# Patient Record
Sex: FEMALE | Race: WHITE | Employment: PART TIME | URBAN - METROPOLITAN AREA
[De-identification: names, ages, dates, MRNs, and addresses within clinical notes are randomized per-mention and may not be internally consistent; named-entity substitution may affect disease eponyms.]

---

## 2017-12-22 ENCOUNTER — OFFICE VISIT (OUTPATIENT)
Dept: SURGERY | Age: 42
End: 2017-12-22

## 2017-12-22 VITALS
TEMPERATURE: 98.3 F | HEART RATE: 78 BPM | OXYGEN SATURATION: 99 % | HEIGHT: 61 IN | WEIGHT: 178.6 LBS | DIASTOLIC BLOOD PRESSURE: 68 MMHG | BODY MASS INDEX: 33.72 KG/M2 | RESPIRATION RATE: 16 BRPM | SYSTOLIC BLOOD PRESSURE: 118 MMHG

## 2017-12-22 RX ORDER — GLIPIZIDE 10 MG/1
10 TABLET ORAL DAILY
COMMUNITY
End: 2018-10-20

## 2017-12-22 RX ORDER — LANOLIN ALCOHOL/MO/W.PET/CERES
CREAM (GRAM) TOPICAL
COMMUNITY
End: 2018-10-20

## 2017-12-22 RX ORDER — ROSUVASTATIN CALCIUM 20 MG/1
5 TABLET, COATED ORAL
COMMUNITY

## 2017-12-22 RX ORDER — INSULIN GLARGINE 100 [IU]/ML
30 INJECTION, SOLUTION SUBCUTANEOUS
COMMUNITY
End: 2018-10-20

## 2017-12-22 RX ORDER — LISINOPRIL 5 MG/1
2.5 TABLET ORAL
COMMUNITY

## 2017-12-22 RX ORDER — METFORMIN HYDROCHLORIDE 500 MG/1
1000 TABLET ORAL 2 TIMES DAILY WITH MEALS
COMMUNITY
End: 2018-10-20

## 2017-12-22 RX ORDER — ASPIRIN 81 MG/1
TABLET ORAL DAILY
COMMUNITY
End: 2018-10-20

## 2017-12-22 NOTE — PROGRESS NOTES
Subjective:   Jordan Curiel is a 38 y.o. female presents for evaluation for bariatric surgery. Co-morbidities include hypertension, diabetes, high cholesterol. Objective:     Visit Vitals    /68 (BP 1 Location: Left arm, BP Patient Position: Sitting)    Pulse 78    Temp 98.3 °F (36.8 °C) (Oral)    Resp 16    Ht 5' 1\" (1.549 m)    Wt 178 lb 9.6 oz (81 kg)    SpO2 99%    BMI 33.75 kg/m2       Physical Exam: deferred     Assessment:     Patient's BMI does not meet the minimum requirement (<35 kg/m^2)    Plan:     1. Follow up when patient meets criteria for bariatric surgery. 12:40 PM - 12:46 PM  Total time spent with patient: 6 minutes.     Signed By: Catrina Laureano     December 22, 2017      Written by Molly Kaur, as dictated by Radha Hoffmann MD.

## 2017-12-22 NOTE — PROGRESS NOTES
1. Have you been to the ER, urgent care clinic since your last visit? Hospitalized since your last visit? No    2. Have you seen or consulted any other health care providers outside of the [address] [place] [address] since your last visit? Include any pap smears or colon screening. No     Ninoska Rodriguez  Body composition    female  38 y.o. Vitals:    12/22/17 1237   BP: 118/68   Pulse: 78   Resp: 16   Temp: 98.3 °F (36.8 °C)   TempSrc: Oral   SpO2: 99%   Weight: 178 lb 9.6 oz (81 kg)   Height: 5' 1\" (1.549 m)     Body mass index is 33.75 kg/(m^2). Marty Keel Neck- 14in  Waist-42. 5in  Hips-41.5in  Frame size-6 cm small frame

## 2018-01-11 ENCOUNTER — OFFICE VISIT (OUTPATIENT)
Dept: SURGERY | Age: 43
End: 2018-01-11

## 2018-01-11 VITALS
DIASTOLIC BLOOD PRESSURE: 71 MMHG | OXYGEN SATURATION: 98 % | BODY MASS INDEX: 35.63 KG/M2 | HEIGHT: 60 IN | WEIGHT: 181.5 LBS | TEMPERATURE: 98.1 F | SYSTOLIC BLOOD PRESSURE: 117 MMHG | HEART RATE: 82 BPM | RESPIRATION RATE: 14 BRPM

## 2018-01-11 DIAGNOSIS — Z79.4 TYPE 2 DIABETES MELLITUS WITH MICROALBUMINURIA, WITH LONG-TERM CURRENT USE OF INSULIN (HCC): ICD-10-CM

## 2018-01-11 DIAGNOSIS — E11.29 TYPE 2 DIABETES MELLITUS WITH MICROALBUMINURIA, WITH LONG-TERM CURRENT USE OF INSULIN (HCC): ICD-10-CM

## 2018-01-11 DIAGNOSIS — R80.9 TYPE 2 DIABETES MELLITUS WITH MICROALBUMINURIA, WITH LONG-TERM CURRENT USE OF INSULIN (HCC): ICD-10-CM

## 2018-01-11 NOTE — PROGRESS NOTES
1. Have you been to the ER, urgent care clinic since your last visit? Hospitalized since your last visit?no    2. Have you seen or consulted any other health care providers outside of the [address] [place] [address] since your last visit? Include any pap smears or colon screening.  no

## 2018-01-11 NOTE — PROGRESS NOTES
Bariatric Surgery Consultation    Subjective: The patient is a 38 y.o. obese  female with a Body mass index is 35.45 kg/(m^2). Aruna Thompson The patient is currently at her heaviest weight. she has been overweight since she was 13years old. she has been considering surgery for 6 months. she desires surgery at this time because she has had little to no success with medical weight loss and her diabetes is getting worse. She has been on insulin for at least 2 years and she has mircoalbuminemia. Janina Arenas has tried multiple diets in her lifetime most recently tried physician supervised, unsupervised diets, Weight Watchers and Atkins        The patient desires laparoscopic gastric bypass surgery for surgical weight loss. The patients goal weight is 120lb. The highest acceptable weight is 170lb. These goals are consistent with expected outcomes of their desired operation. her Medical goals are to improve her blood sugar and prevent diabetic comorbidtites. her qualty of life goals are to have more energy, more self esteem and to be more active. There are no active problems to display for this patient. No past surgical history on file. Social History   Substance Use Topics    Smoking status: Never Smoker    Smokeless tobacco: Never Used    Alcohol use No      Family History   Problem Relation Age of Onset    Cancer Mother      breast    Diabetes Father     Heart Disease Maternal Grandmother     Heart Disease Paternal Grandfather       Prior to Admission medications    Medication Sig Start Date End Date Taking? Authorizing Provider   rosuvastatin (CRESTOR) 20 mg tablet Take 20 mg by mouth nightly. Yes Historical Provider   metFORMIN (GLUCOPHAGE) 500 mg tablet Take  by mouth two (2) times daily (with meals). Yes Historical Provider   ferrous sulfate (IRON) 325 mg (65 mg iron) tablet Take  by mouth Daily (before breakfast).    Yes Historical Provider   glipiZIDE (GLUCOTROL) 10 mg tablet Take 10 mg by mouth two (2) times a day. Yes Historical Provider   aspirin delayed-release 81 mg tablet Take  by mouth daily. Yes Historical Provider   empagliflozin (JARDIANCE) 25 mg tablet Take  by mouth daily. Yes Historical Provider   lisinopril (PRINIVIL, ZESTRIL) 5 mg tablet Take  by mouth daily. Yes Historical Provider   insulin glulisine (APIDRA) 100 unit/mL injection 10 Units by SubCUTAneous route. Yes Historical Provider   insulin glargine (LANTUS) 100 unit/mL injection 30 Units by SubCUTAneous route nightly. Yes Historical Provider     No Known Allergies      Review of Systems:    A comprehensive review of systems was negative except for that written in the HPI. Objective:     Visit Vitals    /71 (BP 1 Location: Left arm, BP Patient Position: Sitting)    Pulse 82    Temp 98.1 °F (36.7 °C) (Oral)    Resp 14    Ht 5' (1.524 m)    Wt 181 lb 8 oz (82.3 kg)    LMP 12/21/2017 (Exact Date)    SpO2 98%    BMI 35.45 kg/m2       Physical Exam:  General:  Alert, cooperative, no distress, appears stated age. obese   Eyes:  Conjunctivae/corneas clear. , EOMs intact. Neck: Supple, symmetrical, trachea midline, no adenopathy, thyroid: no enlargment/tenderness/nodules,    Back:   Symmetric, no curvature. ROM normal. No CVA tenderness. Lungs:   Clear to auscultation bilaterally. Heart:  Regular rate and rhythm, S1, S2 normal, no murmur, click, rub or gallop. Abdomen:   Soft, non-tender. Bowel sounds normal. No masses,  No organomegaly. Extremities: Extremities normal, atraumatic, no cyanosis or edema. Lymph nodes: Cervical, supraclavicular,  nodes normal.   Neurologic: AOX3. Normal strength, sensation throughout. Assessment:     Morbid obesity with comorbidity  Plan:     laparoscopic gastric bypass surgery    This is a 38 y.o. female with a BMI of Body mass index is 35.45 kg/(m^2).  and the weight-related co-morbidties of DM. Michlele Rincon meets the NIH criteria for bariatric surgery based upon the BMI of Body mass index is 35.45 kg/(m^2). and multiple weight-related co-morbidties. Kg Rob has elected laparoscopic quan-en-Y gastric bypass as her intervention of choice for treatment of morbid obestiy through surgical means secondary to its long term history of success. In the office today, following Radha's history and physical examination, a 30 minute discussion regarding the anatomic alterations for the laparoscopic quan-en-Y gastric bypass was undertaken. The dietary expectations and the patient and physician dependent factors for success were thoroughly discussed, to include the need for interval follow-up and long-term dietary changes associated with success. The possible complications of the quan-en-Y gastric bypass  were also discussed, to include; staple line leak, bleeding, stricture, infection, internal hernia and pouch dilation. Specific weight related outcomes for success were also discussed with an emphasis on careful and close follow-up with the first year. The patient expressed an understanding of the above factors, and her questions were answered in their entirety. In addition, the patient attended a 1.5 hour power point seminar regarding obesity, surgical weight loss including, adjustable gastric band, gastric bypass, and sleeve gastrectomy. This discussion contrasted the different surgical techniques, mechanisms of actions and expected outcomes, and surgical and medical risks associated with each procedure. During this seminar, there was a long question and answer session where each questions was answered until there were no additional questions. Today, the patient had all of her questions answered and desires to proceed with pre-qualification for bariatric surgery initially choosing quan-en-Y gastric bypass as her surgical option.     Signed By: Luz Godinez MD     January 11, 2018

## 2018-01-12 DIAGNOSIS — E66.01 MORBID OBESITY (HCC): Primary | ICD-10-CM

## 2018-01-12 DIAGNOSIS — E11.9 TYPE 2 DIABETES MELLITUS WITHOUT COMPLICATION, UNSPECIFIED LONG TERM INSULIN USE STATUS: ICD-10-CM

## 2018-01-26 ENCOUNTER — HOSPITAL ENCOUNTER (OUTPATIENT)
Dept: DIABETES SERVICES | Age: 43
Discharge: HOME | End: 2018-01-26
Payer: COMMERCIAL

## 2018-01-26 PROCEDURE — G0108 DIAB MANAGE TRN  PER INDIV: HCPCS

## 2018-01-26 NOTE — DIABETES MGMT
[facility]  - Diabetes Assessment and   Pre-operative Bariatric Nutrition Education    2018    Referring Physician/Surgeon:   Dr. Ever Conteh  NAME: Anya Rocha : 1975 AGE: 38 y.o. GENDER: female  REASON FOR VISIT:  laparoscopic gastric bypass surgery    Assessment:        Past Medical History:   Diagnosis Date    Diabetes ([facility] [address]        Diabetes Medications:   Metformin 500 mg BID; Glipizide 10 mg/d; Jardiance 25 mg/d; Apidra 15 units ac; Lantus 30 units HS    Vitamins:     Iron    Food Allergies/Intolerances: none reported    Exercise/Physical Activity: walks 3-4d/wk at least 30 minutes     Anthropometrics:   Ht Readings from Last 1 Encounters:   18 5' (1.524 m)      Weight: 180 lb   BMI: 35 kg/M     Obesity Class: 3    Laboratory:  No results found for: HBA1C, HGBE8, [id number]. Pts A1c from her endo office on 18 was 7.6%    Reported Diabetes History:   10 year history of T2DM. Checks BG occasionally, usually only if she feels bad      24 hour Diet recall:  Breakfast skips or will eat Sausage biscuit or will drink a Slim-Fast  Lunch Soup or yogurt & granola or [employer] with chips  Dinner usually fast food- [employer], [employer], [employer] or [employer]  Snacks pretzels, string cheese, fruit  Beverages mostly water; will drink 1 soda or sweet tea/d        Nutrition/Diabetes Intervention:  Patient educated on nutrition recommendations for weight loss surgery, specifically Reviewed intake  Understanding label reading  Understanding low carbohydrates, low sugar, higher protein meals  Understanding proper portions  Dining outside home     . Instructed on consuming 3 meals per day, consistent in carbohydrate, starting now. Use the balanced plate method to plan meals, include 3 ounces of lean source of protein, 1/2 cup whole grains, unlimited non-starchy vegetables, 1/2 cup fruit and 1 serving of low fat dairy.    Utilize handouts listing healthy snack and meal ideas to limit restaurant meals.     Read all nutrition labels. Demonstrated and emphasized identifying serving size, total fat, total carbohydrate, and protein content. Patient to check blood glucose levels 1 times per day and to continue all diabetes medications unless instructed otherwise by MD.      Nutrition Monitoring and Evaluation: The following goals were established with the patient   1. Eat 3 meals/d  2. Eliminate sugary carbonated drinks  3. Check BG daily     Plan:     Specific tips and techniques to facilitate compliance with above recommendations were provided and discussed. Pt was encouraged to begin making necessary changes now         [x]      Patient to follow up with [facility] on 2/23/18 to reevaluate readiness for surgery        My phone number and e-mail was provided to the patient for any further questions or concerns. Shweta Espinal RD, CDE

## 2018-02-02 ENCOUNTER — CLINICAL SUPPORT (OUTPATIENT)
Dept: SURGERY | Age: 43
End: 2018-02-02

## 2018-02-02 DIAGNOSIS — E66.9 OBESITY (BMI 30-39.9): Primary | ICD-10-CM

## 2018-02-05 VITALS — BODY MASS INDEX: 35.54 KG/M2 | WEIGHT: 182 LBS

## 2018-02-05 NOTE — PROGRESS NOTES
Pre-operative Bariatric Nutrition Evaluation (1 of 3)     Date: 2018   Lesa Robertson M.D. Name: Hope Russo  :  1975  Age:  38  Gender: Female   Type of Surgery: [x]           Gastric Bypass  []           LAGB  []           Sleeve Gastrectomy    ASSESSMENT:    Past Medical History: Type II DM, high cholesterol      Medications/Supplements:   Prior to Admission medications    Medication Sig Start Date End Date Taking? Authorizing Provider   rosuvastatin (CRESTOR) 20 mg tablet Take 20 mg by mouth nightly. Historical Provider   metFORMIN (GLUCOPHAGE) 500 mg tablet Take  by mouth two (2) times daily (with meals). Historical Provider   ferrous sulfate (IRON) 325 mg (65 mg iron) tablet Take  by mouth Daily (before breakfast). Historical Provider   glipiZIDE (GLUCOTROL) 10 mg tablet Take 10 mg by mouth two (2) times a day. Historical Provider   aspirin delayed-release 81 mg tablet Take  by mouth daily. Historical Provider   empagliflozin (JARDIANCE) 25 mg tablet Take  by mouth daily. Historical Provider   lisinopril (PRINIVIL, ZESTRIL) 5 mg tablet Take  by mouth daily. Historical Provider   insulin glulisine (APIDRA) 100 unit/mL injection 10 Units by SubCUTAneous route. Historical Provider   insulin glargine (LANTUS) 100 unit/mL injection 30 Units by SubCUTAneous route nightly. Historical Provider       Food Allergies/Intolerances:none    Anthropometrics:    Ht:60\"    Wt: 182#    IBW: 100#    %IBW: 182%     BMI:35    Category: obesity II     Reported wt history:Pt presents today for pre-op nutrition evaluation for wt loss surgery. Pt reports lowest adult BW was 125# at age 33. Highest adult BW was 192# at age 30. Attributes wt gain over the years r/t wt gain with pregnancy and physical inactivity. Has attempted wt loss over the years through various methods.  Most significant wt loss was 30# with Nancie Harding and states \"I was getting  at the time and very motivated to lose weight\". Pt has otherwise been unable to achieve or maintain long term significant wt loss and is now seeking approval for weight loss surgery. Pt is motivated for surgery to improve her health and resolve her diabetes. Pt will need to complete 3 months supervised weight loss with our program to fulfill the insurance requirements. Exercise/Physical Activity:lacks intentional exercise; pt walks throughout the day and take stairs at work, but does not have intentional exercise regimen    Reported Diet History:Orly Hopkins, Weight Watchers, diet pills, Nutrisystem, fasting, exercise, Physician prescribed diets     24 Hour Diet Recall  Breakfast  1 bagel w/ cream cheese, 1% milk   Lunch  Soup (veggie with chicken or beef)    Dinner  Usually fast food - [employer] grilled chicken with fries and soda   Snacks  Oranges or small apple    Beverages  Water throughout the day, 1 soda or tea     A pre-op nutrition checklist was reviewed. Patient checked off 5 of 15 items. Environment/Psychosocial/Support:pt's , kids, parents and friends are all listed as support system. Pt works part time. Pt does grocery shopping and cooking and shares some with . Pt reports she and  are very social and somewhat nervous about surgery will impact that after surgery. NUTRITION DIAGNOSIS:  1. Physical inactivity r/t lack of motivation evidenced by pt reports no current exercise regimen. 2. Excessive energy intake r/t food and beverage choices evidenced by diet recall. Pt drinks some sugar sweetened beverages. Eating fast food/restaurant meals 5 or more times per week. NUTRITION INTERVENTION:  Pt educated on nutrition recommendations for weight loss surgery, specifically gastric bypass. Instructed on consuming 3 meals per day starting now.   Use the balanced plate method to plan meals, include 3 oz of lean source of protein, 1/2 cup whole grains, unlimited non-starchy vegetables, 1/2 cup fruit and 1 serving of low fat dairy. Utilize handouts listing healthy snack and meal ideas to limit restaurant meals. After surgery measure all meals to 1/2 cup. Each meal will contain a 1/4 cup lean protein and 1/4 cup fruit, non-starchy vegetable or starch (limiting to once per day). Aim for 60 g protein per day. Sip on 48-64 oz of sugar free, calorie free, non-carbonated beverages each day. Do not use a straw. Do not consume beverages 30 minutes before, during or 30 minutes after meals. Read all nutrition labels. Demonstrated and emphasized identifying serving size, total fat, sugar and protein content. Defined low fat as </= 3 g per serving. Discussed lean and extra lean sources of protein. Provided list of low fat cooking methods. Avoid foods with sugar listed in the first 3 ingredients and >/15 g sugar per serving. Excess sugar/fat intake may lead to dumping syndrome. Discussed signs and symptoms of dumping syndrome. Practice mindful eating habits; take small bites, chew thoroughly, avoid distractions, utilize hunger/fullness scale. Consume meals over 20-30 minutes. Attend Bariatric Support Group and increase physical activity (approved per MD) for long term weight maintenance. NUTRITION MONITORING AND EVALUATION:    The following goals were established with patient;  1. Increase intentional activity/walking. Consider a 30 minute walk, 3 times per week. Can also do 10-15 minute intervals spaced throughout the day. 2. Decrease soda and sweetened tea. Replace with more water and other calorie-free, sugar-free and non-carbonated options. 3. Decrease fast food and restaurant meals. More cooking and food prepping at home. Eating out should eventually be limited to no more than 1-2 times per week. 4. Eat 3 meals a day. Do not skip meals. 5. Follow up with RD next month for supervised weight loss.            Specific tips and techniques to facilitate compliance with above recommendations were provided and discussed. Pt was strongly encourage to begin making necessary changes now, attend support group, and re-visit the dietitian prn. Nutrition evaluation reveals lifestyle changes are indicated in order for pt to better comply with nutrition guidelines for gastric bypass. Goals set and recommendations made. Will continue to assess as pt works to complete supervised weight loss requirements. If further details are desired please feel free to contact me at [phone]. This phone number was also provided to the patient for any further questions or concerns.            Grace Saldana RD

## 2018-02-28 ENCOUNTER — HOSPITAL ENCOUNTER (OUTPATIENT)
Dept: DIABETES SERVICES | Age: 43
Discharge: HOME | End: 2018-02-28
Payer: COMMERCIAL

## 2018-02-28 DIAGNOSIS — E66.01 MORBID OBESITY (HCC): ICD-10-CM

## 2018-02-28 DIAGNOSIS — E11.9 TYPE 2 DIABETES MELLITUS WITHOUT COMPLICATION, UNSPECIFIED LONG TERM INSULIN USE STATUS: ICD-10-CM

## 2018-02-28 PROCEDURE — G0108 DIAB MANAGE TRN  PER INDIV: HCPCS

## 2018-02-28 NOTE — DIABETES MGMT
[facility]   Pre-operative Bariatric Nutrition Evaluation  2018    NAME: Anjelica Dickson : 1975 AGE: 38 y.o. GENDER: female    REASON FOR VISIT: Follow-up to further education for bariatric surgery      WEIGHT: 185.4 lbs (this is a 5lb increase since she last saw me)    LABORATORY:  No results found for: HBA1C, HGBE8, [id number] Pts A1c at her Endo office on 18 was 7.6%    ASSESSMENT: Since pt was last here, she has made the following changes:  1. She is SMBG 3x/d  2. She has only had 2 regular sodas since seeing me  3. She is trying to monitor food portions     Recommendations Provided to Patient:  1. Meal Planning for semi-solid and soft-texture/low fat diet   2. Signs and treatment for hypoglycemia     Patient's Goal(s):    1. Eliminate regular sodas  2. Measure foods and keep a food diary to ensure eating no more than 45gm CHO/meal    Specific tips and techniques to facilitate compliance with above recommendations were provided and discussed. Pt was encouraged to begin making necessary changes now, attend support group, and re-visit the dietitian prn. If further details are desired please feel free to contact me at [phone]. This phone number was also provided to the patient for any further questions or concerns. Shweta Baron RD, CDE

## 2018-03-01 ENCOUNTER — CLINICAL SUPPORT (OUTPATIENT)
Dept: SURGERY | Age: 43
End: 2018-03-01

## 2018-03-01 VITALS — WEIGHT: 182 LBS | BODY MASS INDEX: 35.54 KG/M2

## 2018-03-01 DIAGNOSIS — E66.9 OBESITY (BMI 30-39.9): Primary | ICD-10-CM

## 2018-03-01 NOTE — PROGRESS NOTES
[employer] [facility]  Supervised Weight Loss     Date:   3/1/2018    Patient's Name: Reji Ramírez  : 1975    Insurance:  [facility]          Session: 2 of  3  Surgery: Gastric Bypass  Surgeon:  Cassy Stewart M.D. Height: 60\"   Weight:    182      Lbs. BMI: 35   Pounds Lost since last month: 0               Pounds Gained since last month: 0    Starting Weight: 182#   Previous Months Weight: 182#  Overall Pounds Lost: 0  Overall Pounds Gained: 0    Other Pertinent Information: n/a     Smoking Status:  none  Alcohol Intake: 1 drink, once a month    I have reviewed with patient the guidelines of the supervised weight loss class. Patient understands the expectations of some weight loss during the weight loss trial.  Patient understands that weight gain could delay the process. I have also expressed to patient that classes need to be consecutive. Missing a class may subject patient to have to start their trial over. Patient has received this information in writing. Changes that patient has made since last month include:  No sodas, trying to be more active. Eating Habits and Behaviors  A nutrition lesson specific to portion control was presented We discussed using the balanced plate method before surgery to practice portion control along with slowing down eating pace. Their plate should be made up of 1/2 coming from non-starchy vegetables, 1/4 coming from lean meat, and 1/4 of their plate coming from carbohydrates, including fruits, starches, or milk. We discussed measuring meals to 1/2 cup total per meal after surgery. Emphasis was placed on the importance of eating 3 meals a day and aiming for 60 grams of protein per day. I educated the patient on limiting liquid calories and drinking only calorie-free, sugar-free and non-carbonated beverages. We discussed the importance of drinking 64 ounces of fluid per day to prevent dehydration post-operatively. Patient's current diet habits include: eating 2-3 meals per day. Snacking on pickles, crackers and cheese. Eating chips, crackers and pasta once a week. Eating cookies 1-2 times per week. Eating baked foods. Eating out is 1-3 times per week. Drinking 7 oz water, 1 oz sweetened tea, 1 oz unsweetened tea per day. Unsure if patient reported ounces of fluid accurately or meant cups. Reports emotional eating r/t boredom and does not report any non-food coping mechanisms in place at this time. Sometimes packing meals when away from home. Eating most meals at a table and while watching television and takes 15-20 minutes to finish the meal. Reports overeating, food choices, portion sizes and lack of activity are biggest barriers to weight loss. Physical Activity/Exercise  During class we discussed the importance of increasing daily physical activity and beginning to develop an exercise regimen/routine. We discussed that exercise is an important part of long term weight loss. Comments:  During class, I discussed with patient the importance of getting into an exercise routine. Patient is currently not exercising stating \"being lazy\". Patient has been encouraged to make an effort to implement exercise this next month and eventually working up to 150 minutes per week. Behavior Modification       Comments: We discussed the importance of eating mindfully after weight loss surgery to prevent food intolerance and prevent weight regain. We talked about how to eat more mindfully and identify emotional eating triggers. Tips and recommendations for how to make these changes were provided. Patient was encouraged to keep a food journal and record what they were taking in daily. Overall Assessment: Patient demonstrates small lifestyle changes this past month evidenced by reported changes and weight maintenance. Will continue to assess as pt works to complete supervised weight loss requirements.      Patient-Set Goals: 1. Nutrition - no more sodas, slow down when eating, be more mindful  2. Exercise - move more, get busy, move during commercials  3.  Behavior - less stress, better sleep, enjoy life    Tani Kaiser, RD  3/1/2018

## 2018-04-03 ENCOUNTER — CLINICAL SUPPORT (OUTPATIENT)
Dept: SURGERY | Age: 43
End: 2018-04-03

## 2018-04-03 VITALS — WEIGHT: 182 LBS | BODY MASS INDEX: 35.54 KG/M2

## 2018-04-03 DIAGNOSIS — E66.9 OBESITY (BMI 30-39.9): Primary | ICD-10-CM

## 2018-04-03 NOTE — PROGRESS NOTES
[facility] [place]  Supervised Weight Loss     Date:   4/3/2018    Patient's Name: Tri Wilcox  : 1975    Insurance:  Mikaela Mcconnell          Session: 3 of  3  Surgery: Gastric Bypass  Surgeon:  Jarrell Heimlich, M.D. Height: 60\"   Weight:    182      Lbs. BMI: 35   Pounds Lost since last month: 0               Pounds Gained since last month: 0    Starting Weight: 182#   Previous Months Weight: 182#  Overall Pounds Lost: 0  Overall Pounds Gained: 0    Other Pertinent Information: n/a     Smoking Status:  none  Alcohol Intake: 1 drink, once a month     I have reviewed with patient the guidelines of the supervised weight loss class. Patient understands the expectations of some weight loss during the weight loss trial.  Patient understands that weight gain could delay the process. I have also expressed to patient that classes need to be consecutive. Missing a class may subject patient to have to start their trial over. Patient has received this information in writing. Changes that patient has made since last month include:  Less sodas, walking more, smaller portions. Eating Habits and Behaviors  A review of the general nutrition guidelines in preparation for weight loss surgery was provided. Patients were instructed that their plate should be made up 1/2 plate coming from non-starchy vegetables, 1/4 coming from lean meat, and 1/4 of their plate coming from carbohydrates, including fruits, starches, or milk. We discussed measuring meals to 1/2 cup total per meal after surgery. Emphasis was placed on the importance of eating 3 meals a day and aiming for 60 grams of protein per day. I educated the patient on limiting liquid calories and drinking only calorie-free, sugar-free and non-carbonated beverages. We discussed the importance of drinking 64 ounces of fluid per day to prevent dehydration post-operatively.                        Patient's current diet habits include: eating 2-3 meals per day. Snacking on fruit, baked chips and pickles. Eating baked chips and bread 1-2 times per week and cookies once a week or less. Eating mostly baked foods. Eating out is 1-3 times per week. Drinking mostly water and half sweet/half unsweetened tea. Denies emotional eating and sometimes situational eating. Eating most meals at a table and takes 15-20 minutes to finish the meal. Reports overeating, night eating, food choices, portion sizes, snacking and lack of activity are all barriers to weight loss at this time. Physical Activity/Exercise  A lifestyle and behavior change discussion was provided specific to exercise. We discussed common barriers to exercise, ways to work around barriers and various ways to get started with exercise. We talked about the importance of increasing daily physical activity and beginning to develop an exercise regimen/routine. We discussed that exercise is an important part of long term weight loss after surgery. Comments:  During class, I discussed with patient the importance of getting into an exercise routine. Patient is currently walking, taking the stairs and parking further away for activity. Patient has been encouraged to maintain and increase as tolerated. Behavior Modification       Comments: We discussed the importance of eating mindfully after weight loss surgery to prevent food intolerance and prevent weight regain. We talked about how to eat more mindfully and identify emotional eating triggers. Tips and recommendations for how to make these changes were provided. Patient was encouraged to keep a food journal and record what they were taking in daily. Overall Assessment: Patient demonstrates some small lifestyle changes in preparation for weight loss surgery evidenced by reported changes and weight maintenance/not gaining. Will continue to assess as pt works to complete supervised weight loss requirements.      Patient-Set Goals: 1. Nutrition - eat healthier, eat smarter   2. Exercise - more more, do more exercise   3.  Behavior -stop making excuses     Urvashi Marquez, KIRSTEN  4/3/2018

## 2018-05-08 ENCOUNTER — CLINICAL SUPPORT (OUTPATIENT)
Dept: SURGERY | Age: 43
End: 2018-05-08

## 2018-05-08 VITALS — BODY MASS INDEX: 35.54 KG/M2 | WEIGHT: 182 LBS

## 2018-05-08 DIAGNOSIS — E66.9 OBESITY (BMI 30-39.9): Primary | ICD-10-CM

## 2018-05-08 NOTE — PROGRESS NOTES
[facility]  Supervised Weight Loss     Date:   2018    Patient's Name: Katerina White  : 1975    Insurance:  Cabrera Barahona          Session: 4 of  3 (90 days completed)   Surgery: Gastric Bypass  Surgeon:  Shannan Noe M.D. Height: 60\"   Weight:    182      Lbs. BMI: 35   Pounds Lost since last month: 0               Pounds Gained since last month: 0    Starting Weight: 182#   Previous Months Weight: 182#  Overall Pounds Lost: 0  Overall Pounds Gained: 0    Other Pertinent Information: n/a     Smoking Status:  none  Alcohol Intake: none    I have reviewed with patient the guidelines of the supervised weight loss class. Patient understands the expectations of some weight loss during the weight loss trial.  Patient understands that weight gain could delay the process. I have also expressed to patient that classes need to be consecutive. Missing a class may subject patient to have to start their trial over. Patient has received this information in writing. Changes that patient has made since last month include:  No sodas, reduced intake of sweetened tea, minimal fast food intake. Eating Habits and Behaviors  A review of the general nutrition guidelines in preparation for weight loss surgery was provided. A nutrition less was presented regarding label reading with specific emphasis on limiting added sugars to help promote weight loss and prevent dumping syndrome. Patients were instructed that their plate should be made up 1/2 plate coming from non-starchy vegetables, 1/4 coming from lean meat, and 1/4 of their plate coming from carbohydrates, including fruits, starches, or milk. We discussed measuring meals to 1/2 cup total per meal after surgery. Emphasis was placed on the importance of eating 3 meals a day and aiming for 60 grams of protein per day.  I educated the patient on limiting liquid calories and drinking only calorie-free, sugar-free and non-carbonated beverages. We discussed the importance of drinking 64 ounces of fluid per day to prevent dehydration post-operatively. Patient's current diet habits include: eating 2-3 meals per day. Snacking on fruit, crackers and pickles. Eating chips and pretzels 1-2 times per week. Eating cake once a month. Eating baked, grilled, broiled and some fried foods. Eating out is 1-3 times per week. Drinking 8 oz water, half sweetened half unsweetened tea. Sometimes emotional and situational eating. Sometimes packing meals when away from home. Eating most meals at a table and takes 15-20 minutes to finish the meal. Reports overeating, grazing, food choices, portion sizes and lack of activity are biggest barriers to weight loss at this time. Physical Activity/Exercise  We talked about the importance of increasing daily physical activity and beginning to develop an exercise regimen/routine. We discussed that exercise is an important part of long term weight loss after surgery. Comments:  During class, I discussed with patient the importance of getting into an exercise routine. Patient is currently walking 2-3 times per week for activity. Patient has been encouraged to maintain and increase as tolerated. Behavior Modification       Comments: We discussed the importance of eating mindfully after weight loss surgery to prevent food intolerance and prevent weight regain. We talked about how to eat more mindfully and identify emotional eating triggers. Tips and recommendations for how to make these changes were provided. Patient was encouraged to keep a food journal and record what they were taking in daily. Overall Assessment: Patient has demonstrated small appropriate lifestyle changes in preparation for weight loss surgery. Appears to understand the general nutrition guidelines and is appropriate for surgery at this time from a nutrition standpoint. Patient-Set Goals:   1. Nutrition - eliminate all sodas and sweet tea  2. Exercise - walk more often and further   3.  Behavior -less tv and food time     Sari Katz, [address]  5/8/2018

## 2018-05-22 DIAGNOSIS — E66.01 MORBID OBESITY (HCC): Primary | ICD-10-CM

## 2018-05-22 DIAGNOSIS — E11.9 TYPE 2 DIABETES MELLITUS WITHOUT COMPLICATION, UNSPECIFIED WHETHER LONG TERM INSULIN USE (HCC): ICD-10-CM

## 2018-07-19 ENCOUNTER — HOSPITAL ENCOUNTER (OUTPATIENT)
Dept: ULTRASOUND IMAGING | Age: 43
Discharge: HOME | End: 2018-07-19
Attending: NURSE PRACTITIONER
Payer: COMMERCIAL

## 2018-07-19 ENCOUNTER — OFFICE VISIT (OUTPATIENT)
Dept: SURGERY | Age: 43
End: 2018-07-19

## 2018-07-19 ENCOUNTER — HOSPITAL ENCOUNTER (OUTPATIENT)
Dept: PREADMISSION TESTING | Age: 43
Discharge: HOME | End: 2018-07-19
Attending: NURSE PRACTITIONER
Payer: COMMERCIAL

## 2018-07-19 ENCOUNTER — HOSPITAL ENCOUNTER (OUTPATIENT)
Dept: GENERAL RADIOLOGY | Age: 43
Discharge: HOME | End: 2018-07-19
Attending: NURSE PRACTITIONER
Payer: COMMERCIAL

## 2018-07-19 VITALS
HEART RATE: 108 BPM | BODY MASS INDEX: 35.02 KG/M2 | DIASTOLIC BLOOD PRESSURE: 80 MMHG | TEMPERATURE: 97.8 F | SYSTOLIC BLOOD PRESSURE: 118 MMHG | WEIGHT: 185.5 LBS | HEIGHT: 61 IN | OXYGEN SATURATION: 97 % | RESPIRATION RATE: 18 BRPM

## 2018-07-19 VITALS
BODY MASS INDEX: 34.58 KG/M2 | TEMPERATURE: 98.3 F | HEIGHT: 61 IN | SYSTOLIC BLOOD PRESSURE: 119 MMHG | WEIGHT: 183.13 LBS | DIASTOLIC BLOOD PRESSURE: 73 MMHG | HEART RATE: 76 BPM | OXYGEN SATURATION: 98 %

## 2018-07-19 DIAGNOSIS — Z01.818 PREOP GENERAL PHYSICAL EXAM: ICD-10-CM

## 2018-07-19 DIAGNOSIS — E66.01 MORBID OBESITY (HCC): Primary | ICD-10-CM

## 2018-07-19 DIAGNOSIS — E66.01 MORBID OBESITY (HCC): ICD-10-CM

## 2018-07-19 DIAGNOSIS — E11.9 TYPE 2 DIABETES MELLITUS WITHOUT COMPLICATION, UNSPECIFIED WHETHER LONG TERM INSULIN USE (HCC): ICD-10-CM

## 2018-07-19 LAB
25(OH)D3 SERPL-MCNC: 14.5 NG/ML (ref 30–100)
ALBUMIN SERPL-MCNC: 3.9 G/DL (ref 3.5–5)
ALBUMIN/GLOB SERPL: 1.1 {RATIO} (ref 1.1–2.2)
ALP SERPL-CCNC: 65 U/L (ref 45–117)
ALT SERPL-CCNC: 27 U/L (ref 12–78)
ANION GAP SERPL CALC-SCNC: 10 MMOL/L (ref 5–15)
APPEARANCE UR: CLEAR
AST SERPL-CCNC: 21 U/L (ref 15–37)
ATRIAL RATE: 68 BPM
BACTERIA URNS QL MICRO: ABNORMAL /HPF
BASOPHILS # BLD: 0.1 K/UL (ref 0–0.1)
BASOPHILS NFR BLD: 1 % (ref 0–1)
BILIRUB SERPL-MCNC: 0.4 MG/DL (ref 0.2–1)
BILIRUB UR QL: NEGATIVE
BUN SERPL-MCNC: 10 MG/DL (ref 6–20)
BUN/CREAT SERPL: 24 (ref 12–20)
CALCIUM SERPL-MCNC: 9.3 MG/DL (ref 8.5–10.1)
CALCULATED P AXIS, ECG09: 24 DEGREES
CALCULATED R AXIS, ECG10: 17 DEGREES
CALCULATED T AXIS, ECG11: 19 DEGREES
CHLORIDE SERPL-SCNC: 103 MMOL/L (ref 97–108)
CHOLEST SERPL-MCNC: 141 MG/DL
CO2 SERPL-SCNC: 25 MMOL/L (ref 21–32)
COLOR UR: ABNORMAL
CREAT SERPL-MCNC: 0.41 MG/DL (ref 0.55–1.02)
DIAGNOSIS, 93000: NORMAL
DIFFERENTIAL METHOD BLD: ABNORMAL
EOSINOPHIL # BLD: 0.2 K/UL (ref 0–0.4)
EOSINOPHIL NFR BLD: 3 % (ref 0–7)
EPITH CASTS URNS QL MICRO: ABNORMAL /LPF
ERYTHROCYTE [DISTWIDTH] IN BLOOD BY AUTOMATED COUNT: 15 % (ref 11.5–14.5)
GLOBULIN SER CALC-MCNC: 3.7 G/DL (ref 2–4)
GLUCOSE SERPL-MCNC: 133 MG/DL (ref 65–100)
GLUCOSE UR STRIP.AUTO-MCNC: >1000 MG/DL
HCT VFR BLD AUTO: 42.4 % (ref 35–47)
HGB BLD-MCNC: 13.7 G/DL (ref 11.5–16)
HGB UR QL STRIP: NEGATIVE
HYALINE CASTS URNS QL MICRO: ABNORMAL /LPF (ref 0–5)
IMM GRANULOCYTES # BLD: 0.1 K/UL (ref 0–0.04)
IMM GRANULOCYTES NFR BLD AUTO: 1 % (ref 0–0.5)
IRON SERPL-MCNC: 62 UG/DL (ref 35–150)
KETONES UR QL STRIP.AUTO: ABNORMAL MG/DL
LDH SERPL L TO P-CCNC: 104 U/L (ref 81–246)
LEUKOCYTE ESTERASE UR QL STRIP.AUTO: NEGATIVE
LYMPHOCYTES # BLD: 3.2 K/UL (ref 0.8–3.5)
LYMPHOCYTES NFR BLD: 34 % (ref 12–49)
MAGNESIUM SERPL-MCNC: 1.8 MG/DL (ref 1.6–2.4)
MCH RBC QN AUTO: 25.2 PG (ref 26–34)
MCHC RBC AUTO-ENTMCNC: 32.3 G/DL (ref 30–36.5)
MCV RBC AUTO: 78.1 FL (ref 80–99)
MONOCYTES # BLD: 0.6 K/UL (ref 0–1)
MONOCYTES NFR BLD: 6 % (ref 5–13)
NEUTS SEG # BLD: 5.2 K/UL (ref 1.8–8)
NEUTS SEG NFR BLD: 56 % (ref 32–75)
NITRITE UR QL STRIP.AUTO: NEGATIVE
NRBC # BLD: 0 K/UL (ref 0–0.01)
NRBC BLD-RTO: 0 PER 100 WBC
P-R INTERVAL, ECG05: 162 MS
PH UR STRIP: 5 [PH] (ref 5–8)
PLATELET # BLD AUTO: 351 K/UL (ref 150–400)
PMV BLD AUTO: 8.5 FL (ref 8.9–12.9)
POTASSIUM SERPL-SCNC: 4 MMOL/L (ref 3.5–5.1)
PROT SERPL-MCNC: 7.6 G/DL (ref 6.4–8.2)
PROT UR STRIP-MCNC: NEGATIVE MG/DL
Q-T INTERVAL, ECG07: 422 MS
QRS DURATION, ECG06: 84 MS
QTC CALCULATION (BEZET), ECG08: 448 MS
RBC # BLD AUTO: 5.43 M/UL (ref 3.8–5.2)
RBC #/AREA URNS HPF: ABNORMAL /HPF (ref 0–5)
SODIUM SERPL-SCNC: 138 MMOL/L (ref 136–145)
SP GR UR REFRACTOMETRY: 1.03 (ref 1–1.03)
T4 FREE SERPL-MCNC: 1 NG/DL (ref 0.8–1.5)
TSH SERPL DL<=0.05 MIU/L-ACNC: 1.28 UIU/ML (ref 0.36–3.74)
UA: UC IF INDICATED,UAUC: ABNORMAL
UROBILINOGEN UR QL STRIP.AUTO: 0.2 EU/DL (ref 0.2–1)
VENTRICULAR RATE, ECG03: 68 BPM
WBC # BLD AUTO: 9.4 K/UL (ref 3.6–11)
WBC URNS QL MICRO: ABNORMAL /HPF (ref 0–4)

## 2018-07-19 PROCEDURE — 82465 ASSAY BLD/SERUM CHOLESTEROL: CPT | Performed by: SURGERY

## 2018-07-19 PROCEDURE — 80053 COMPREHEN METABOLIC PANEL: CPT | Performed by: SURGERY

## 2018-07-19 PROCEDURE — 84443 ASSAY THYROID STIM HORMONE: CPT | Performed by: SURGERY

## 2018-07-19 PROCEDURE — 76700 US EXAM ABDOM COMPLETE: CPT

## 2018-07-19 PROCEDURE — 86677 HELICOBACTER PYLORI ANTIBODY: CPT | Performed by: SURGERY

## 2018-07-19 PROCEDURE — 83735 ASSAY OF MAGNESIUM: CPT | Performed by: SURGERY

## 2018-07-19 PROCEDURE — 83615 LACTATE (LD) (LDH) ENZYME: CPT | Performed by: SURGERY

## 2018-07-19 PROCEDURE — 82306 VITAMIN D 25 HYDROXY: CPT | Performed by: SURGERY

## 2018-07-19 PROCEDURE — 71046 X-RAY EXAM CHEST 2 VIEWS: CPT

## 2018-07-19 PROCEDURE — 87086 URINE CULTURE/COLONY COUNT: CPT | Performed by: SURGERY

## 2018-07-19 PROCEDURE — 81001 URINALYSIS AUTO W/SCOPE: CPT | Performed by: SURGERY

## 2018-07-19 PROCEDURE — 93005 ELECTROCARDIOGRAM TRACING: CPT

## 2018-07-19 PROCEDURE — 84439 ASSAY OF FREE THYROXINE: CPT | Performed by: SURGERY

## 2018-07-19 PROCEDURE — 83540 ASSAY OF IRON: CPT | Performed by: SURGERY

## 2018-07-19 PROCEDURE — 85025 COMPLETE CBC W/AUTO DIFF WBC: CPT | Performed by: SURGERY

## 2018-07-19 RX ORDER — ONDANSETRON 4 MG/1
4 TABLET, ORALLY DISINTEGRATING ORAL
Qty: 30 TAB | Refills: 0 | Status: SHIPPED | OUTPATIENT
Start: 2018-07-19 | End: 2018-10-20

## 2018-07-19 RX ORDER — PHENOL/SODIUM PHENOLATE
20 AEROSOL, SPRAY (ML) MUCOUS MEMBRANE DAILY
Qty: 30 TAB | Refills: 2 | Status: SHIPPED | OUTPATIENT
Start: 2018-07-19 | End: 2018-10-20

## 2018-07-19 RX ORDER — ERGOCALCIFEROL 1.25 MG/1
50000 CAPSULE ORAL
Qty: 12 CAP | Refills: 3 | Status: SHIPPED | OUTPATIENT
Start: 2018-07-20

## 2018-07-19 NOTE — PROGRESS NOTES
1. Have you been to the ER, urgent care clinic since your last visit? Hospitalized since your last visit? No    2. Have you seen or consulted any other health care providers outside of the [address] [place] [address] since your last visit? Include any pap smears or colon screening.   No

## 2018-07-19 NOTE — PATIENT INSTRUCTIONS
Stop Aspirin one week prior to surgery. Laparoscopic Joey-en-Y Gastric Bypass: Before Your Surgery  What is a laparoscopic Joey-en-Y gastric bypass? A Joey-en-Y (say \"mitchell-en-why\") gastric bypass is surgery to help you lose weight. It does this in two ways. First, it makes the stomach smaller. Second, it changes the connection between the stomach and the intestines. These changes help you eat less and feel full sooner. You will be asleep during the surgery. The doctor will make several small cuts in your belly. These cuts are called incisions. Then the doctor puts special tools and a camera through the incisions. Next, the doctor separates the upper part of your stomach from the rest of your stomach to make a small pouch. This new pouch will hold the food you eat. The doctor will connect the new stomach pouch to the middle part of your small intestine. Then he or she will close the incisions with stitches. The incisions leave scars that fade with time. After the surgery, the food you eat will go from the small pouch to the middle part of your intestine. Food will no longer go through the lower part of your stomach or the first part of your intestines. You will stay in the hospital 1 or more days after the surgery. Most people can go back to work or their usual routine in about 2 to 4 weeks. Follow-up care is a key part of your treatment and safety. Be sure to make and go to all appointments, and call your doctor if you are having problems. It's also a good idea to know your test results and keep a list of the medicines you take. What happens before surgery?   Surgery can be stressful. This information will help you understand what you can expect. And it will help you safely prepare for surgery.   Preparing for surgery    · Understand exactly what surgery is planned, along with the risks, benefits, and other options.     · Tell your doctors ALL the medicines, vitamins, supplements, and herbal remedies you take. Some of these can increase the risk of bleeding or interact with anesthesia.     · If you take blood thinners, such as warfarin (Coumadin), clopidogrel (Plavix), or aspirin, be sure to talk to your doctor. He or she will tell you if you should stop taking these medicines before your surgery. Make sure that you understand exactly what your doctor wants you to do.     · Your doctor will tell you which medicines to take or stop before your surgery. You may need to stop taking certain medicines a week or more before surgery. So talk to your doctor as soon as you can.     · If you have an advance directive, let your doctor know. It may include a living will and a durable power of  for health care. Bring a copy to the hospital. If you don't have one, you may want to prepare one. It lets your doctor and loved ones know your health care wishes. Doctors advise that everyone prepare these papers before any type of surgery or procedure.     · You may need to follow a clear liquid diet for several days before surgery. Your doctor will tell you how to do this. What happens on the day of surgery? · Follow the instructions exactly about when to stop eating and drinking. If you don't, your surgery may be canceled. If your doctor told you to take your medicines on the day of surgery, take them with only a sip of water.     · Take a bath or shower before you come in for your surgery. Do not apply lotions, perfumes, deodorants, or nail polish.     · Do not shave the surgical site yourself.     · Take off all jewelry and piercings. And take out contact lenses, if you wear them.    At the hospital or surgery center   · Bring a picture ID.     · The area for surgery is often marked to make sure there are no errors.     · You will be kept comfortable and safe by your anesthesia provider. You will be asleep during the surgery.     · The surgery will take about 2 to 3 hours.    Going home   · Be sure you have someone to drive you home. Anesthesia and pain medicine make it unsafe for you to drive.     · You will be given more specific instructions about recovering from your surgery. They will cover things like diet, wound care, follow-up care, driving, and getting back to your normal routine. When should you call your doctor? · You have questions or concerns.     · You don't understand how to prepare for your surgery.     · You become ill before the surgery (such as fever, flu, or a cold).     · You need to reschedule or have changed your mind about having the surgery. Where can you learn more? Go to http://vivi-teagan.info/. Enter L963 in the search box to learn more about \"Laparoscopic Joey-en-Y Gastric Bypass: Before Your Surgery. \"  Current as of: September 10, 2017  Content Version: 11.7  © 0201-2083 [employer]. Care instructions adapted under license by [employer] (which disclaims liability or warranty for this information). If you have questions about a medical condition or this instruction, always ask your healthcare professional. [address] any warranty or liability for your use of this information.

## 2018-07-19 NOTE — MR AVS SNAPSHOT
[address] [place] [address] 
[phone] Patient: Radha Ross MRN: [id number]:[id number] Visit Information Date & Time Provider Department Dept. Phone Encounter #  
 7/19/2018  1:00 PM Kehinde Jones NP [place] [id number] [phone] [id number] Your Appointments 7/20/2018 10:30 AM  
ESTABLISHED PATIENT with Rbuy Jean MD  
[address] [facility] [address] [place] [address]) Appt Note: *sign consents for RY 8-15-18*; *sign consents for RY 8-15-18*  
 [address]  
[phone]  
  
   
 [address] Upcoming Health Maintenance Date Due HEMOGLOBIN A1C Q6M 1973 LIPID PANEL Q1 1973 FOOT EXAM Q1 3/9/1983 MICROALBUMIN Q1 3/9/1983 EYE EXAM RETINAL OR DILATED Q1 3/9/1983 Pneumococcal 19-64 Medium Risk (1 of 1 - PPSV23) 3/9/1992 DTaP/Tdap/Td series (1 - Tdap) 3/9/1994 PAP AKA CERVICAL CYTOLOGY 3/9/1994 Influenza Age 5 to Adult 8/1/2018 Allergies as of 7/19/2018  Review Complete On: 7/19/2018 By: Kehinde Jones NP No Known Allergies Current Immunizations  Never Reviewed No immunizations on file. Not reviewed this visit You Were Diagnosed With   
  
 Codes Comments Morbid obesity ([facility] [address]    -  Primary ICD-10-CM: E66.01 
ICD-9-CM: 278.01 Preop general physical exam     ICD-10-CM: U17.076 ICD-9-CM: V72.83 Type 2 diabetes mellitus without complication, unspecified whether long term insulin use (HCC)     ICD-10-CM: E11.9 ICD-9-CM: 250.00 BMI 35.0-35.9,adult     ICD-10-CM: Y01.73 ICD-9-CM: V85.35 Vitals BP Pulse Temp Resp Height(growth percentile) Weight(growth percentile) 118/80 (BP 1 Location: Left arm, BP Patient Position: Sitting) (!) 108 97.8 °F (36.6 °C) (Oral) 18 5' 0.5\" (1.537 m) 185 lb 8 oz (84.1 kg) LMP SpO2 BMI OB Status Smoking Status 07/06/2018 (Approximate) 97% 35.63 kg/m2 Having regular periods Never Smoker BMI and BSA Data Body Mass Index Body Surface Area  
 35.63 kg/m 2 1.89 m 2 Preferred Pharmacy Pharmacy Name Phone Deisi Laureano [address] [phone] Your Updated Medication List  
  
   
This list is accurate as of 7/19/18  1:42 PM.  Always use your most recent med list.  
  
  
  
  
 APIDRA U-100 INSULIN 100 unit/mL injection Generic drug:  insulin glulisine U-100  
15 Units by SubCUTAneous route Before breakfast, lunch, and dinner. aspirin delayed-release 81 mg tablet Take  by mouth daily. ergocalciferol 50,000 unit capsule Commonly known as:  ERGOCALCIFEROL Take 1 Cap by mouth every Monday and Friday. Start taking on:  7/20/2018  
  
 glipiZIDE 10 mg tablet Commonly known as:  Seldon Vesna Take 10 mg by mouth daily. Iron 325 mg (65 mg iron) tablet Generic drug:  ferrous sulfate Take  by mouth Daily (before breakfast). JARDIANCE 25 mg tablet Generic drug:  empagliflozin Take  by mouth daily. LANTUS U-100 INSULIN 100 unit/mL injection Generic drug:  insulin glargine 30 Units by SubCUTAneous route nightly. lisinopril 5 mg tablet Commonly known as:  Nany Loss Take 5 mg by mouth nightly. metFORMIN 500 mg tablet Commonly known as:  GLUCOPHAGE Take 1,000 mg by mouth two (2) times daily (with meals). Omeprazole delayed release 20 mg tablet Commonly known as:  PRILOSEC D/R Take 1 Tab by mouth daily. ondansetron 4 mg disintegrating tablet Commonly known as:  ZOFRAN ODT Take 1 Tab by mouth every six (6) hours as needed for Nausea. rosuvastatin 20 mg tablet Commonly known as:  CRESTOR Take 20 mg by mouth nightly. Prescriptions Sent to Pharmacy Refills ergocalciferol (ERGOCALCIFEROL) 50,000 unit capsule 3 Starting on: 7/20/2018 Sig: Take 1 Cap by mouth every Monday and Friday. Class: Normal  
 Pharmacy: [facility] DR SITA PALACIOS [address] [place] [address] Ph #: [phone] Route: Oral  
 Omeprazole delayed release (PRILOSEC D/R) 20 mg tablet 2 Sig: Take 1 Tab by mouth daily. Class: Normal  
 Pharmacy: [facility] DR SITA PALACIOS [address] [place] [address] Ph #: [phone] Route: Oral  
 ondansetron (ZOFRAN ODT) 4 mg disintegrating tablet 0 Sig: Take 1 Tab by mouth every six (6) hours as needed for Nausea. Class: Normal  
 Pharmacy: [facility] DR SITA PALACIOS [address] [place] [address] Ph #: [phone] Route: Oral  
  
Patient Instructions Stop Aspirin one week prior to surgery. Laparoscopic Joey-en-Y Gastric Bypass: Before Your Surgery What is a laparoscopic Joey-en-Y gastric bypass? A Joey-en-Y (say \"mitchell-en-why\") gastric bypass is surgery to help you lose weight. It does this in two ways. First, it makes the stomach smaller. Second, it changes the connection between the stomach and the intestines. These changes help you eat less and feel full sooner. You will be asleep during the surgery. The doctor will make several small cuts in your belly. These cuts are called incisions. Then the doctor puts special tools and a camera through the incisions. Next, the doctor separates the upper part of your stomach from the rest of your stomach to make a small pouch. This new pouch will hold the food you eat. The doctor will connect the new stomach pouch to the middle part of your small intestine. Then he or she will close the incisions with stitches. The incisions leave scars that fade with time. After the surgery, the food you eat will go from the small pouch to the middle part of your intestine. Food will no longer go through the lower part of your stomach or the first part of your intestines. You will stay in the hospital 1 or more days after the surgery. Most people can go back to work or their usual routine in about 2 to 4 weeks. Follow-up care is a key part of your treatment and safety. Be sure to make and go to all appointments, and call your doctor if you are having problems. It's also a good idea to know your test results and keep a list of the medicines you take. What happens before surgery? 
 Surgery can be stressful. This information will help you understand what you can expect. And it will help you safely prepare for surgery. 
 Preparing for surgery 
  · Understand exactly what surgery is planned, along with the risks, benefits, and other options. · Tell your doctors ALL the medicines, vitamins, supplements, and herbal remedies you take. Some of these can increase the risk of bleeding or interact with anesthesia.  
  · If you take blood thinners, such as warfarin (Coumadin), clopidogrel (Plavix), or aspirin, be sure to talk to your doctor. He or she will tell you if you should stop taking these medicines before your surgery. Make sure that you understand exactly what your doctor wants you to do.  
  · Your doctor will tell you which medicines to take or stop before your surgery. You may need to stop taking certain medicines a week or more before surgery. So talk to your doctor as soon as you can.  
  · If you have an advance directive, let your doctor know. It may include a living will and a durable power of  for health care. Bring a copy to the hospital. If you don't have one, you may want to prepare one. It lets your doctor and loved ones know your health care wishes. Doctors advise that everyone prepare these papers before any type of surgery or procedure.  
  · You may need to follow a clear liquid diet for several days before surgery. Your doctor will tell you how to do this. What happens on the day of surgery? · Follow the instructions exactly about when to stop eating and drinking. If you don't, your surgery may be canceled. If your doctor told you to take your medicines on the day of surgery, take them with only a sip of water.  
  · Take a bath or shower before you come in for your surgery. Do not apply lotions, perfumes, deodorants, or nail polish.  
  · Do not shave the surgical site yourself.  
  · Take off all jewelry and piercings. And take out contact lenses, if you wear them.  
 At the hospital or surgery center · Bring a picture ID.  
  · The area for surgery is often marked to make sure there are no errors.  
  · You will be kept comfortable and safe by your anesthesia provider. You will be asleep during the surgery.  
  · The surgery will take about 2 to 3 hours. Going home · Be sure you have someone to drive you home. Anesthesia and pain medicine make it unsafe for you to drive.  
  · You will be given more specific instructions about recovering from your surgery. They will cover things like diet, wound care, follow-up care, driving, and getting back to your normal routine. When should you call your doctor? · You have questions or concerns.  
  · You don't understand how to prepare for your surgery.  
  · You become ill before the surgery (such as fever, flu, or a cold).  
  · You need to reschedule or have changed your mind about having the surgery. Where can you learn more? Go to http://vivi-teagan.info/. Enter K829 in the search box to learn more about \"Laparoscopic Joey-en-Y Gastric Bypass: Before Your Surgery. \" Current as of: September 10, 2017 Content Version: 11.7 © 2628-8664 [employer]wise[employer]. Care instructions adapted under license by [employer] (which disclaims liability or warranty for this information).  If you have questions about a medical condition or this instruction, always ask your healthcare professional. [employer] disclaims any warranty or liability for your use of this information. Introducing \[facility]\"" & HEALTH SERVICES! Billy Sky introduces piSociety patient portal. Now you can access parts of your medical record, email your doctor's office, and request medication refills online. 1. In your internet browser, go to [email or web address] 2. Click on the First Time User? Click Here link in the Sign In box. You will see the New Member Sign Up page. 3. Enter your piSociety Access Code exactly as it appears below. You will not need to use this code after youve completed the sign-up process. If you do not sign up before the expiration date, you must request a new code. · piSociety Access Code: [facility] Expires: 10/15/2018  2:02 PM 
 
4. Enter the last four digits of your Social Security Number (xxxx) and Date of Birth (mm/dd/yyyy) as indicated and click Submit. You will be taken to the next sign-up page. 5. Create a piSociety ID. This will be your piSociety login ID and cannot be changed, so think of one that is secure and easy to remember. 6. Create a piSociety password. You can change your password at any time. 7. Enter your Password Reset Question and Answer. This can be used at a later time if you forget your password. 8. Enter your e-mail address. You will receive e-mail notification when new information is available in [address] 9. Click Sign Up. You can now view and download portions of your medical record. 10. Click the Download Summary menu link to download a portable copy of your medical information. If you have questions, please visit the Frequently Asked Questions section of the pi[employer]ociety website. Remember, piSociety is NOT to be used for urgent needs. For medical emergencies, dial 911. Now available from your iPhone and Android! Please provide this summary of care documentation to your next provider. Your primary care clinician is listed as Shi Duncan. If you have any questions after today's visit, please call [phone].

## 2018-07-19 NOTE — PROGRESS NOTES
Bariatric Surgery History and Physical  Kasandra Templeton is a 37 y.o. female Scheduled for Laparoscopic Gastric Bypass with Dr. Yoni Jorgensen on August 14, 2018 at [facility]. Patient comes in office today for history and physical, and to receive preoperative liver shrinking diet. Patient height is 5'0.5'', weight is 185.5 pounds, BMI is 35.63     Patient Active Problem List    Diagnosis Date Noted    Diabetes [facility])      Past Medical History:   Diagnosis Date    Chronic pain     ULISSES. HIPS    Diabetes ([address]     IDDM    History of esophageal dilatation 2008    Tuberculosis 2015    H/O POS. PPD - [place] NINE MOS. ANTIBX THERAPY      Past Surgical History:   Procedure Laterality Date    HX DILATION AND CURETTAGE  2003    HX ORTHOPAEDIC  2003    ULISSES. CARPAL TUNNEL    HX UROLOGICAL  01/2017    BLADDER SURGERY - MESH      Social History   Substance Use Topics    Smoking status: Never Smoker    Smokeless tobacco: Never Used    Alcohol use Yes      Comment: 2 DRINKS PER MONTH      Family History   Problem Relation Age of Onset    Cancer Mother      breast    Diabetes Father     Heart Disease Maternal Grandmother     Heart Disease Paternal Grandfather     Anesth Problems Neg Hx       Prior to Admission medications    Medication Sig Start Date End Date Taking? Authorizing Provider   ergocalciferol (ERGOCALCIFEROL) 50,000 unit capsule Take 1 Cap by mouth every Monday and Friday. 7/20/18  Yes Jackie Peterson NP   Omeprazole delayed release (PRILOSEC D/R) 20 mg tablet Take 1 Tab by mouth daily. 7/19/18  Yes Jackie Peterson NP   ondansetron (ZOFRAN ODT) 4 mg disintegrating tablet Take 1 Tab by mouth every six (6) hours as needed for Nausea. 7/19/18  Yes Jackie Peterson NP   rosuvastatin (CRESTOR) 20 mg tablet Take 20 mg by mouth nightly. Yes Historical Provider   metFORMIN (GLUCOPHAGE) 500 mg tablet Take 1,000 mg by mouth two (2) times daily (with meals).    Yes Historical Provider   ferrous sulfate (IRON) 325 mg (65 mg iron) tablet Take  by mouth Daily (before breakfast). Yes Historical Provider   glipiZIDE (GLUCOTROL) 10 mg tablet Take 10 mg by mouth daily. Yes Historical Provider   aspirin delayed-release 81 mg tablet Take  by mouth daily. Yes Historical Provider   empagliflozin (JARDIANCE) 25 mg tablet Take  by mouth daily. Yes Historical Provider   lisinopril (PRINIVIL, ZESTRIL) 5 mg tablet Take 5 mg by mouth nightly. Yes Historical Provider   insulin glulisine (APIDRA) 100 unit/mL injection 15 Units by SubCUTAneous route Before breakfast, lunch, and dinner. Yes Historical Provider   insulin glargine (LANTUS) 100 unit/mL injection 30 Units by SubCUTAneous route nightly. Yes Historical Provider     No Known Allergies   JOSUE Dunn is primary care provider      HPI    Review of Systems   Constitutional: Negative for chills, fever and malaise/fatigue. HENT: Negative for congestion, hearing loss and nosebleeds. Eyes: Negative for blurred vision, double vision, photophobia, pain and discharge. Respiratory: Negative for cough, sputum production and shortness of breath. Cardiovascular: Negative for chest pain, palpitations and leg swelling. Gastrointestinal: Negative for abdominal pain, blood in stool, constipation, diarrhea, heartburn, nausea and vomiting. Genitourinary: Positive for dysuria. Negative for frequency, hematuria and urgency. Symptoms of yeast infection   Musculoskeletal: Positive for joint pain. Bilateral hip pain    Skin: Negative for itching and rash. Neurological: Negative for dizziness, seizures and loss of consciousness. Endo/Heme/Allergies: Does not bruise/bleed easily. Diagnosed with diabetes over 10 years ago. Blood glucose levels 130's-140's   Psychiatric/Behavioral: Negative for depression, hallucinations, memory loss, substance abuse and suicidal ideas. The patient is not nervous/anxious and does not have insomnia.         Physical Exam Constitutional: She is oriented to person, place, and time. She appears well-developed and well-nourished. Non-toxic appearance. No distress. HENT:   Head: Normocephalic and atraumatic. Head is without abrasion and without contusion. Right Ear: Hearing and external ear normal.   Left Ear: Hearing and external ear normal.   Nose: No nasal deformity or septal deviation. Mouth/Throat: Uvula is midline, oropharynx is clear and moist and mucous membranes are normal. She does not have dentures. Normal dentition. Eyes: Conjunctivae and lids are normal. Pupils are equal, round, and reactive to light. Neck: Trachea normal and normal range of motion. Carotid bruit is not present. Cardiovascular: Normal rate, regular rhythm, S1 normal, S2 normal, normal heart sounds and normal pulses. No murmur heard. Pulmonary/Chest: Effort normal and breath sounds normal. No respiratory distress. She has no decreased breath sounds. She has no wheezes. She has no rhonchi. She has no rales. She exhibits no laceration, no crepitus and no retraction. Abdominal: Soft. Bowel sounds are normal. She exhibits no distension. There is no tenderness. There is no rebound and no guarding. No hernia. Abdomen obese, non-tender, non-distended. No hernia/masses palpated   Musculoskeletal: Normal range of motion. She exhibits no edema. Lymphadenopathy:        Head (right side): No submental, no submandibular, no tonsillar, no preauricular and no posterior auricular adenopathy present. Head (left side): No submental, no submandibular, no tonsillar, no preauricular and no posterior auricular adenopathy present. She has no cervical adenopathy. Neurological: She is alert and oriented to person, place, and time. She has normal strength. No cranial nerve deficit or sensory deficit. Skin: Skin is warm and dry. No rash noted. No erythema. Nails show no clubbing. Psychiatric: She has a normal mood and affect.  Her speech is normal and behavior is normal.   Blood pressure 118/80, pulse (!) 108, temperature 97.8 °F (36.6 °C), temperature source Oral, resp. rate 18, height 5' 0.5\" (1.537 m), weight 185 lb 8 oz (84.1 kg), last menstrual period 07/06/2018, SpO2 97 %. ASSESSMENT and PLAN    Morbid obesity with body mass index of 35.63. Co-morbids listed above    Patient is scheduled for Laparoscopic Gastric Bypass with Dr. Holly Abraham on August 14, 2018 at [facility].Counseled patient in regard to post diet restrictions, follow- up office visits, follow- up care, and follow up medications. Prescriptions for Zofran and Omeprazole given. Reviewed available preop labs, Vitamin D low. Prescription Vitamin D started. Patient as received educational booklet and vitamin list. Patient to start liver shrinking diet on August 1, 2018. Patient to discontinue use of Metformin and Aspirin 1 week prior to surgery. Answered all questions and patient wishes to proceed.       Signed By: Adarsh Gunn NP     July 19, 2018      28 minutes was spent with patient

## 2018-07-20 ENCOUNTER — OFFICE VISIT (OUTPATIENT)
Dept: SURGERY | Age: 43
End: 2018-07-20

## 2018-07-20 VITALS
BODY MASS INDEX: 34.93 KG/M2 | OXYGEN SATURATION: 99 % | DIASTOLIC BLOOD PRESSURE: 68 MMHG | HEIGHT: 61 IN | SYSTOLIC BLOOD PRESSURE: 119 MMHG | HEART RATE: 84 BPM | RESPIRATION RATE: 18 BRPM | WEIGHT: 185 LBS

## 2018-07-20 DIAGNOSIS — E11.9 TYPE 2 DIABETES MELLITUS WITHOUT COMPLICATION, UNSPECIFIED WHETHER LONG TERM INSULIN USE (HCC): ICD-10-CM

## 2018-07-20 LAB
BACTERIA SPEC CULT: ABNORMAL
BACTERIA SPEC CULT: ABNORMAL
CC UR VC: ABNORMAL
H PYLORI IGG SER IA-ACNC: <0.8 INDEX VALUE (ref 0–0.79)
SERVICE CMNT-IMP: ABNORMAL

## 2018-07-20 NOTE — PROGRESS NOTES
Consent for Joey-en-Y.    1. Have you been to the ER, urgent care clinic since your last visit? Hospitalized since your last visit? No    2. Have you seen or consulted any other health care providers outside of the [facility] since your last visit? Include any pap smears or colon screening.  No

## 2018-07-20 NOTE — PERIOP NOTES
[facility]: ABNORMAL LAB -VIT D  14.5

## 2018-07-27 NOTE — PROGRESS NOTES
Lorena Clinton is an 37 y.o.   female who comes for a meet the doctor appointment. she is planning on the laparoscopic gastric bypass surgery . she has completed her pre-operative work-up and is ready form surgery. We discussed our office consent form in detail and we both signed it. We discussed complications including but not limited to bleeding, infection, injury to abdominal organs, leak, PE/DVT, cardiopulmonary events and poor weight loss. she understands and agrees to surgery. More than 30 minutes was spent with the patient and over half that time was spent on counseling on the risks of the gastric bypass surgery.

## 2018-08-14 ENCOUNTER — ANESTHESIA EVENT (OUTPATIENT)
Dept: SURGERY | Age: 43
DRG: 621 | End: 2018-08-14
Payer: COMMERCIAL

## 2018-08-15 ENCOUNTER — HOSPITAL ENCOUNTER (INPATIENT)
Age: 43
LOS: 2 days | Discharge: HOME | DRG: 621 | End: 2018-08-17
Attending: SURGERY | Admitting: SURGERY
Payer: COMMERCIAL

## 2018-08-15 ENCOUNTER — ANESTHESIA (OUTPATIENT)
Dept: SURGERY | Age: 43
DRG: 621 | End: 2018-08-15
Payer: COMMERCIAL

## 2018-08-15 PROBLEM — E66.9 OBESITY: Status: ACTIVE | Noted: 2018-08-15

## 2018-08-15 LAB
GLUCOSE BLD STRIP.AUTO-MCNC: 106 MG/DL (ref 65–100)
GLUCOSE BLD STRIP.AUTO-MCNC: 124 MG/DL (ref 65–100)
GLUCOSE BLD STRIP.AUTO-MCNC: 151 MG/DL (ref 65–100)
GLUCOSE BLD STRIP.AUTO-MCNC: 191 MG/DL (ref 65–100)
HCG UR QL: NEGATIVE
SERVICE CMNT-IMP: ABNORMAL

## 2018-08-15 PROCEDURE — 77030012029 HC APPL CLP LIG COVD -C: Performed by: SURGERY

## 2018-08-15 PROCEDURE — 76010000133 HC OR TIME 3 TO 3.5 HR: Performed by: SURGERY

## 2018-08-15 PROCEDURE — 77030010286 HC STPLR ENDOSC COVD -D: Performed by: SURGERY

## 2018-08-15 PROCEDURE — 77030020747 HC TU INSUF ENDOSC TELE -A: Performed by: SURGERY

## 2018-08-15 PROCEDURE — 0D164ZA BYPASS STOMACH TO JEJUNUM, PERCUTANEOUS ENDOSCOPIC APPROACH: ICD-10-PCS | Performed by: SURGERY

## 2018-08-15 PROCEDURE — 77030035048 HC TRCR ENDOSC OPTCL COVD -B: Performed by: SURGERY

## 2018-08-15 PROCEDURE — 0DJ08ZZ INSPECTION OF UPPER INTESTINAL TRACT, VIA NATURAL OR ARTIFICIAL OPENING ENDOSCOPIC: ICD-10-PCS | Performed by: SURGERY

## 2018-08-15 PROCEDURE — 77030016151 HC PROTCTR LNS DFOG COVD -B: Performed by: SURGERY

## 2018-08-15 PROCEDURE — 77030013567 HC DRN WND RESERV BARD -A: Performed by: SURGERY

## 2018-08-15 PROCEDURE — 77030009965 HC RELD STPLR ENDOS COVD -D: Performed by: SURGERY

## 2018-08-15 PROCEDURE — 74011250636 HC RX REV CODE- 250/636

## 2018-08-15 PROCEDURE — 77030032490 HC SLV COMPR SCD KNE COVD -B: Performed by: SURGERY

## 2018-08-15 PROCEDURE — 76210000063 HC OR PH I REC FIRST 0.5 HR: Performed by: SURGERY

## 2018-08-15 PROCEDURE — 77030008684 HC TU ET CUF COVD -B: Performed by: ANESTHESIOLOGY

## 2018-08-15 PROCEDURE — 88307 TISSUE EXAM BY PATHOLOGIST: CPT | Performed by: SURGERY

## 2018-08-15 PROCEDURE — 77030018836 HC SOL IRR NACL ICUM -A: Performed by: SURGERY

## 2018-08-15 PROCEDURE — 76060000037 HC ANESTHESIA 3 TO 3.5 HR: Performed by: SURGERY

## 2018-08-15 PROCEDURE — 94760 N-INVAS EAR/PLS OXIMETRY 1: CPT

## 2018-08-15 PROCEDURE — 77030018846 HC SOL IRR STRL H20 ICUM -A: Performed by: SURGERY

## 2018-08-15 PROCEDURE — 77030037367 HC STPLR ENDO TRI-STPLR COVD -D: Performed by: SURGERY

## 2018-08-15 PROCEDURE — 74011250636 HC RX REV CODE- 250/636: Performed by: SURGERY

## 2018-08-15 PROCEDURE — 74011250636 HC RX REV CODE- 250/636: Performed by: ANESTHESIOLOGY

## 2018-08-15 PROCEDURE — 74011000250 HC RX REV CODE- 250

## 2018-08-15 PROCEDURE — 77030037366 HC STPLR ENDO TRI-STPLR COVD -C: Performed by: SURGERY

## 2018-08-15 PROCEDURE — 65660000000 HC RM CCU STEPDOWN

## 2018-08-15 PROCEDURE — 77030020263 HC SOL INJ SOD CL0.9% LFCR 1000ML: Performed by: SURGERY

## 2018-08-15 PROCEDURE — 77030002916 HC SUT ETHLN J&J -A: Performed by: SURGERY

## 2018-08-15 PROCEDURE — 77030035045 HC TRCR ENDOSC VRSPRT BLDLSS COVD -B: Performed by: SURGERY

## 2018-08-15 PROCEDURE — 77030002912 HC SUT ETHBND J&J -A: Performed by: SURGERY

## 2018-08-15 PROCEDURE — 77030038157 HC DEV PWR CNTR DISP SIGNIA COVD -C: Performed by: SURGERY

## 2018-08-15 PROCEDURE — 77030032435: Performed by: SURGERY

## 2018-08-15 PROCEDURE — 77030035051: Performed by: SURGERY

## 2018-08-15 PROCEDURE — 77030020782 HC GWN BAIR PAWS FLX 3M -B

## 2018-08-15 PROCEDURE — 81025 URINE PREGNANCY TEST: CPT

## 2018-08-15 PROCEDURE — 77030008756 HC TU IRR SUC STRY -B: Performed by: SURGERY

## 2018-08-15 PROCEDURE — 77030020053 HC ELECTRD LAPSCP COVD -B: Performed by: SURGERY

## 2018-08-15 PROCEDURE — 82962 GLUCOSE BLOOD TEST: CPT

## 2018-08-15 PROCEDURE — 74011636637 HC RX REV CODE- 636/637: Performed by: SURGERY

## 2018-08-15 PROCEDURE — 77030035042 HC TRCR ENDOSC OPTCL BLDLSS COVD -D: Performed by: SURGERY

## 2018-08-15 PROCEDURE — 77030026438 HC STYL ET INTUB CARD -A: Performed by: ANESTHESIOLOGY

## 2018-08-15 PROCEDURE — 74011000250 HC RX REV CODE- 250: Performed by: SURGERY

## 2018-08-15 PROCEDURE — 77030037032 HC INSRT SCIS CLICKLLINE DISP STOR -B: Performed by: SURGERY

## 2018-08-15 PROCEDURE — 77030002933 HC SUT MCRYL J&J -A: Performed by: SURGERY

## 2018-08-15 PROCEDURE — 77030009403 HC ELECTRD ENDO MEGA -B: Performed by: SURGERY

## 2018-08-15 PROCEDURE — 77030031139 HC SUT VCRL2 J&J -A: Performed by: SURGERY

## 2018-08-15 PROCEDURE — 77030002996 HC SUT SLK J&J -A: Performed by: SURGERY

## 2018-08-15 PROCEDURE — 77030012407 HC DRN WND BARD -B: Performed by: SURGERY

## 2018-08-15 PROCEDURE — 74011250637 HC RX REV CODE- 250/637: Performed by: SURGERY

## 2018-08-15 PROCEDURE — 77030039266 HC ADH SKN EXOFIN S2SG -A: Performed by: SURGERY

## 2018-08-15 PROCEDURE — 77030034154 HC SHR COAG HARM ACE J&J -F: Performed by: SURGERY

## 2018-08-15 PROCEDURE — 77030011640 HC PAD GRND REM COVD -A: Performed by: SURGERY

## 2018-08-15 PROCEDURE — 77030002895 HC DEV VASC CLOSR COVD -B: Performed by: SURGERY

## 2018-08-15 PROCEDURE — 77030009852 HC PCH RTVR ENDOSC COVD -B: Performed by: SURGERY

## 2018-08-15 RX ORDER — MAGNESIUM SULFATE 100 %
4 CRYSTALS MISCELLANEOUS AS NEEDED
Status: DISCONTINUED | OUTPATIENT
Start: 2018-08-15 | End: 2018-08-17 | Stop reason: HOSPADM

## 2018-08-15 RX ORDER — MORPHINE SULFATE 10 MG/ML
2 INJECTION, SOLUTION INTRAMUSCULAR; INTRAVENOUS
Status: DISCONTINUED | OUTPATIENT
Start: 2018-08-15 | End: 2018-08-15 | Stop reason: HOSPADM

## 2018-08-15 RX ORDER — CEFAZOLIN SODIUM/WATER 2 G/20 ML
2 SYRINGE (ML) INTRAVENOUS
Status: COMPLETED | OUTPATIENT
Start: 2018-08-15 | End: 2018-08-15

## 2018-08-15 RX ORDER — MIDAZOLAM HYDROCHLORIDE 1 MG/ML
1 INJECTION, SOLUTION INTRAMUSCULAR; INTRAVENOUS AS NEEDED
Status: DISCONTINUED | OUTPATIENT
Start: 2018-08-15 | End: 2018-08-15 | Stop reason: HOSPADM

## 2018-08-15 RX ORDER — SODIUM CHLORIDE 0.9 % (FLUSH) 0.9 %
5-10 SYRINGE (ML) INJECTION AS NEEDED
Status: DISCONTINUED | OUTPATIENT
Start: 2018-08-15 | End: 2018-08-15 | Stop reason: HOSPADM

## 2018-08-15 RX ORDER — SODIUM CHLORIDE, SODIUM LACTATE, POTASSIUM CHLORIDE, CALCIUM CHLORIDE 600; 310; 30; 20 MG/100ML; MG/100ML; MG/100ML; MG/100ML
INJECTION, SOLUTION INTRAVENOUS
Status: DISCONTINUED | OUTPATIENT
Start: 2018-08-15 | End: 2018-08-15 | Stop reason: HOSPADM

## 2018-08-15 RX ORDER — DEXMEDETOMIDINE HYDROCHLORIDE 4 UG/ML
INJECTION, SOLUTION INTRAVENOUS AS NEEDED
Status: DISCONTINUED | OUTPATIENT
Start: 2018-08-15 | End: 2018-08-15 | Stop reason: HOSPADM

## 2018-08-15 RX ORDER — PANTOPRAZOLE SODIUM 40 MG/1
40 TABLET, DELAYED RELEASE ORAL
Status: DISCONTINUED | OUTPATIENT
Start: 2018-08-16 | End: 2018-08-17 | Stop reason: HOSPADM

## 2018-08-15 RX ORDER — DIPHENHYDRAMINE HYDROCHLORIDE 50 MG/ML
50 INJECTION, SOLUTION INTRAMUSCULAR; INTRAVENOUS
Status: DISCONTINUED | OUTPATIENT
Start: 2018-08-15 | End: 2018-08-17 | Stop reason: HOSPADM

## 2018-08-15 RX ORDER — LIDOCAINE HYDROCHLORIDE 10 MG/ML
0.1 INJECTION, SOLUTION EPIDURAL; INFILTRATION; INTRACAUDAL; PERINEURAL AS NEEDED
Status: DISCONTINUED | OUTPATIENT
Start: 2018-08-15 | End: 2018-08-15 | Stop reason: HOSPADM

## 2018-08-15 RX ORDER — SODIUM CHLORIDE, SODIUM LACTATE, POTASSIUM CHLORIDE, CALCIUM CHLORIDE 600; 310; 30; 20 MG/100ML; MG/100ML; MG/100ML; MG/100ML
75 INJECTION, SOLUTION INTRAVENOUS CONTINUOUS
Status: DISCONTINUED | OUTPATIENT
Start: 2018-08-15 | End: 2018-08-15 | Stop reason: HOSPADM

## 2018-08-15 RX ORDER — ROSUVASTATIN CALCIUM 10 MG/1
20 TABLET, COATED ORAL
Status: DISCONTINUED | OUTPATIENT
Start: 2018-08-15 | End: 2018-08-17 | Stop reason: HOSPADM

## 2018-08-15 RX ORDER — SODIUM CHLORIDE 0.9 % (FLUSH) 0.9 %
5-10 SYRINGE (ML) INJECTION AS NEEDED
Status: DISCONTINUED | OUTPATIENT
Start: 2018-08-15 | End: 2018-08-17 | Stop reason: HOSPADM

## 2018-08-15 RX ORDER — SODIUM CHLORIDE 0.9 % (FLUSH) 0.9 %
5-10 SYRINGE (ML) INJECTION EVERY 8 HOURS
Status: DISCONTINUED | OUTPATIENT
Start: 2018-08-15 | End: 2018-08-15 | Stop reason: HOSPADM

## 2018-08-15 RX ORDER — KETOROLAC TROMETHAMINE 30 MG/ML
INJECTION, SOLUTION INTRAMUSCULAR; INTRAVENOUS AS NEEDED
Status: DISCONTINUED | OUTPATIENT
Start: 2018-08-15 | End: 2018-08-15 | Stop reason: HOSPADM

## 2018-08-15 RX ORDER — MORPHINE SULFATE 1 MG/ML
1 INJECTION, SOLUTION EPIDURAL; INTRATHECAL; INTRAVENOUS ONCE
Status: COMPLETED | OUTPATIENT
Start: 2018-08-15 | End: 2018-08-15

## 2018-08-15 RX ORDER — LORAZEPAM 2 MG/ML
1 INJECTION INTRAMUSCULAR
Status: DISCONTINUED | OUTPATIENT
Start: 2018-08-15 | End: 2018-08-17 | Stop reason: HOSPADM

## 2018-08-15 RX ORDER — HYDROMORPHONE HYDROCHLORIDE 2 MG/ML
INJECTION, SOLUTION INTRAMUSCULAR; INTRAVENOUS; SUBCUTANEOUS AS NEEDED
Status: DISCONTINUED | OUTPATIENT
Start: 2018-08-15 | End: 2018-08-15 | Stop reason: HOSPADM

## 2018-08-15 RX ORDER — OXYCODONE HYDROCHLORIDE 5 MG/1
5 TABLET ORAL
Status: DISCONTINUED | OUTPATIENT
Start: 2018-08-15 | End: 2018-08-17 | Stop reason: HOSPADM

## 2018-08-15 RX ORDER — ONDANSETRON 2 MG/ML
INJECTION INTRAMUSCULAR; INTRAVENOUS AS NEEDED
Status: DISCONTINUED | OUTPATIENT
Start: 2018-08-15 | End: 2018-08-15 | Stop reason: HOSPADM

## 2018-08-15 RX ORDER — BUPIVACAINE HYDROCHLORIDE 5 MG/ML
INJECTION, SOLUTION EPIDURAL; INTRACAUDAL AS NEEDED
Status: DISCONTINUED | OUTPATIENT
Start: 2018-08-15 | End: 2018-08-15 | Stop reason: HOSPADM

## 2018-08-15 RX ORDER — GLYCOPYRROLATE 0.2 MG/ML
INJECTION INTRAMUSCULAR; INTRAVENOUS AS NEEDED
Status: DISCONTINUED | OUTPATIENT
Start: 2018-08-15 | End: 2018-08-15 | Stop reason: HOSPADM

## 2018-08-15 RX ORDER — FENTANYL CITRATE 50 UG/ML
INJECTION, SOLUTION INTRAMUSCULAR; INTRAVENOUS AS NEEDED
Status: DISCONTINUED | OUTPATIENT
Start: 2018-08-15 | End: 2018-08-15 | Stop reason: HOSPADM

## 2018-08-15 RX ORDER — FENTANYL CITRATE 50 UG/ML
25 INJECTION, SOLUTION INTRAMUSCULAR; INTRAVENOUS
Status: DISCONTINUED | OUTPATIENT
Start: 2018-08-15 | End: 2018-08-15 | Stop reason: HOSPADM

## 2018-08-15 RX ORDER — ONDANSETRON 2 MG/ML
4 INJECTION INTRAMUSCULAR; INTRAVENOUS AS NEEDED
Status: DISCONTINUED | OUTPATIENT
Start: 2018-08-15 | End: 2018-08-15 | Stop reason: HOSPADM

## 2018-08-15 RX ORDER — FENTANYL CITRATE 50 UG/ML
50 INJECTION, SOLUTION INTRAMUSCULAR; INTRAVENOUS AS NEEDED
Status: DISCONTINUED | OUTPATIENT
Start: 2018-08-15 | End: 2018-08-15 | Stop reason: HOSPADM

## 2018-08-15 RX ORDER — NEOSTIGMINE METHYLSULFATE 1 MG/ML
INJECTION INTRAVENOUS AS NEEDED
Status: DISCONTINUED | OUTPATIENT
Start: 2018-08-15 | End: 2018-08-15 | Stop reason: HOSPADM

## 2018-08-15 RX ORDER — SODIUM CHLORIDE 9 MG/ML
50 INJECTION, SOLUTION INTRAVENOUS CONTINUOUS
Status: DISCONTINUED | OUTPATIENT
Start: 2018-08-15 | End: 2018-08-15 | Stop reason: HOSPADM

## 2018-08-15 RX ORDER — PROPOFOL 10 MG/ML
INJECTION, EMULSION INTRAVENOUS AS NEEDED
Status: DISCONTINUED | OUTPATIENT
Start: 2018-08-15 | End: 2018-08-15 | Stop reason: HOSPADM

## 2018-08-15 RX ORDER — DEXTROSE 50 % IN WATER (D50W) INTRAVENOUS SYRINGE
12.5-25 AS NEEDED
Status: DISCONTINUED | OUTPATIENT
Start: 2018-08-15 | End: 2018-08-17 | Stop reason: HOSPADM

## 2018-08-15 RX ORDER — SUCCINYLCHOLINE CHLORIDE 20 MG/ML
INJECTION INTRAMUSCULAR; INTRAVENOUS AS NEEDED
Status: DISCONTINUED | OUTPATIENT
Start: 2018-08-15 | End: 2018-08-15 | Stop reason: HOSPADM

## 2018-08-15 RX ORDER — SODIUM CHLORIDE 9 MG/ML
125 INJECTION, SOLUTION INTRAVENOUS CONTINUOUS
Status: DISCONTINUED | OUTPATIENT
Start: 2018-08-15 | End: 2018-08-16

## 2018-08-15 RX ORDER — MIDAZOLAM HYDROCHLORIDE 1 MG/ML
0.5 INJECTION, SOLUTION INTRAMUSCULAR; INTRAVENOUS
Status: DISCONTINUED | OUTPATIENT
Start: 2018-08-15 | End: 2018-08-15 | Stop reason: HOSPADM

## 2018-08-15 RX ORDER — INSULIN LISPRO 100 [IU]/ML
INJECTION, SOLUTION INTRAVENOUS; SUBCUTANEOUS EVERY 6 HOURS
Status: DISCONTINUED | OUTPATIENT
Start: 2018-08-15 | End: 2018-08-17 | Stop reason: HOSPADM

## 2018-08-15 RX ORDER — ENOXAPARIN SODIUM 100 MG/ML
40 INJECTION SUBCUTANEOUS EVERY 24 HOURS
Status: DISCONTINUED | OUTPATIENT
Start: 2018-08-16 | End: 2018-08-17 | Stop reason: HOSPADM

## 2018-08-15 RX ORDER — ENOXAPARIN SODIUM 100 MG/ML
40 INJECTION SUBCUTANEOUS
Status: COMPLETED | OUTPATIENT
Start: 2018-08-15 | End: 2018-08-15

## 2018-08-15 RX ORDER — LIDOCAINE HYDROCHLORIDE 20 MG/ML
INJECTION, SOLUTION EPIDURAL; INFILTRATION; INTRACAUDAL; PERINEURAL AS NEEDED
Status: DISCONTINUED | OUTPATIENT
Start: 2018-08-15 | End: 2018-08-15 | Stop reason: HOSPADM

## 2018-08-15 RX ORDER — ONDANSETRON 2 MG/ML
4 INJECTION INTRAMUSCULAR; INTRAVENOUS
Status: DISCONTINUED | OUTPATIENT
Start: 2018-08-15 | End: 2018-08-17 | Stop reason: HOSPADM

## 2018-08-15 RX ORDER — DIPHENHYDRAMINE HYDROCHLORIDE 50 MG/ML
12.5 INJECTION, SOLUTION INTRAMUSCULAR; INTRAVENOUS AS NEEDED
Status: DISCONTINUED | OUTPATIENT
Start: 2018-08-15 | End: 2018-08-15 | Stop reason: HOSPADM

## 2018-08-15 RX ORDER — HYOSCYAMINE SULFATE 0.12 MG/ML
125 LIQUID ORAL
Status: DISCONTINUED | OUTPATIENT
Start: 2018-08-15 | End: 2018-08-17 | Stop reason: HOSPADM

## 2018-08-15 RX ORDER — SODIUM CHLORIDE 0.9 % (FLUSH) 0.9 %
5-10 SYRINGE (ML) INJECTION EVERY 8 HOURS
Status: DISCONTINUED | OUTPATIENT
Start: 2018-08-15 | End: 2018-08-17 | Stop reason: HOSPADM

## 2018-08-15 RX ORDER — LISINOPRIL 5 MG/1
5 TABLET ORAL
Status: DISCONTINUED | OUTPATIENT
Start: 2018-08-15 | End: 2018-08-17 | Stop reason: HOSPADM

## 2018-08-15 RX ORDER — ROCURONIUM BROMIDE 10 MG/ML
INJECTION, SOLUTION INTRAVENOUS AS NEEDED
Status: DISCONTINUED | OUTPATIENT
Start: 2018-08-15 | End: 2018-08-15 | Stop reason: HOSPADM

## 2018-08-15 RX ORDER — NALOXONE HYDROCHLORIDE 0.4 MG/ML
0.4 INJECTION, SOLUTION INTRAMUSCULAR; INTRAVENOUS; SUBCUTANEOUS AS NEEDED
Status: DISCONTINUED | OUTPATIENT
Start: 2018-08-15 | End: 2018-08-17 | Stop reason: HOSPADM

## 2018-08-15 RX ORDER — MIDAZOLAM HYDROCHLORIDE 1 MG/ML
INJECTION, SOLUTION INTRAMUSCULAR; INTRAVENOUS AS NEEDED
Status: DISCONTINUED | OUTPATIENT
Start: 2018-08-15 | End: 2018-08-15 | Stop reason: HOSPADM

## 2018-08-15 RX ORDER — OXYCODONE AND ACETAMINOPHEN 5; 325 MG/1; MG/1
1 TABLET ORAL AS NEEDED
Status: DISCONTINUED | OUTPATIENT
Start: 2018-08-15 | End: 2018-08-15 | Stop reason: HOSPADM

## 2018-08-15 RX ADMIN — INSULIN LISPRO 2 UNITS: 100 INJECTION, SOLUTION INTRAVENOUS; SUBCUTANEOUS at 19:04

## 2018-08-15 RX ADMIN — PROPOFOL 160 MG: 10 INJECTION, EMULSION INTRAVENOUS at 07:32

## 2018-08-15 RX ADMIN — HYDROMORPHONE HYDROCHLORIDE 0.5 MG: 2 INJECTION, SOLUTION INTRAMUSCULAR; INTRAVENOUS; SUBCUTANEOUS at 09:44

## 2018-08-15 RX ADMIN — SUCCINYLCHOLINE CHLORIDE 140 MG: 20 INJECTION INTRAMUSCULAR; INTRAVENOUS at 07:32

## 2018-08-15 RX ADMIN — DEXMEDETOMIDINE HYDROCHLORIDE 20 MCG: 4 INJECTION, SOLUTION INTRAVENOUS at 09:45

## 2018-08-15 RX ADMIN — OXYCODONE HYDROCHLORIDE 5 MG: 5 TABLET ORAL at 20:20

## 2018-08-15 RX ADMIN — GLYCOPYRROLATE 0.5 MG: 0.2 INJECTION INTRAMUSCULAR; INTRAVENOUS at 10:00

## 2018-08-15 RX ADMIN — Medication 2 G: at 07:45

## 2018-08-15 RX ADMIN — SODIUM CHLORIDE 125 ML/HR: 900 INJECTION, SOLUTION INTRAVENOUS at 20:31

## 2018-08-15 RX ADMIN — SODIUM CHLORIDE 125 ML/HR: 900 INJECTION, SOLUTION INTRAVENOUS at 11:38

## 2018-08-15 RX ADMIN — FENTANYL CITRATE 100 MCG: 50 INJECTION, SOLUTION INTRAMUSCULAR; INTRAVENOUS at 07:32

## 2018-08-15 RX ADMIN — ROCURONIUM BROMIDE 5 MG: 10 INJECTION, SOLUTION INTRAVENOUS at 08:48

## 2018-08-15 RX ADMIN — LIDOCAINE HYDROCHLORIDE 60 MG: 20 INJECTION, SOLUTION EPIDURAL; INFILTRATION; INTRACAUDAL; PERINEURAL at 07:32

## 2018-08-15 RX ADMIN — LISINOPRIL 5 MG: 5 TABLET ORAL at 21:15

## 2018-08-15 RX ADMIN — SODIUM CHLORIDE, SODIUM LACTATE, POTASSIUM CHLORIDE, AND CALCIUM CHLORIDE 75 ML/HR: 600; 310; 30; 20 INJECTION, SOLUTION INTRAVENOUS at 06:31

## 2018-08-15 RX ADMIN — SODIUM CHLORIDE, SODIUM LACTATE, POTASSIUM CHLORIDE, CALCIUM CHLORIDE: 600; 310; 30; 20 INJECTION, SOLUTION INTRAVENOUS at 07:23

## 2018-08-15 RX ADMIN — FENTANYL CITRATE 25 MCG: 50 INJECTION, SOLUTION INTRAMUSCULAR; INTRAVENOUS at 11:10

## 2018-08-15 RX ADMIN — MIDAZOLAM HYDROCHLORIDE 2 MG: 1 INJECTION, SOLUTION INTRAMUSCULAR; INTRAVENOUS at 07:24

## 2018-08-15 RX ADMIN — FENTANYL CITRATE 25 MCG: 50 INJECTION, SOLUTION INTRAMUSCULAR; INTRAVENOUS at 11:25

## 2018-08-15 RX ADMIN — FENTANYL CITRATE 100 MCG: 50 INJECTION, SOLUTION INTRAMUSCULAR; INTRAVENOUS at 07:39

## 2018-08-15 RX ADMIN — ROSUVASTATIN CALCIUM 20 MG: 10 TABLET, FILM COATED ORAL at 21:15

## 2018-08-15 RX ADMIN — SODIUM CHLORIDE, SODIUM LACTATE, POTASSIUM CHLORIDE, CALCIUM CHLORIDE: 600; 310; 30; 20 INJECTION, SOLUTION INTRAVENOUS at 08:45

## 2018-08-15 RX ADMIN — ROCURONIUM BROMIDE 50 MG: 10 INJECTION, SOLUTION INTRAVENOUS at 07:40

## 2018-08-15 RX ADMIN — ROCURONIUM BROMIDE 5 MG: 10 INJECTION, SOLUTION INTRAVENOUS at 08:43

## 2018-08-15 RX ADMIN — Medication 10 ML: at 14:14

## 2018-08-15 RX ADMIN — ONDANSETRON 4 MG: 2 INJECTION INTRAMUSCULAR; INTRAVENOUS at 09:30

## 2018-08-15 RX ADMIN — KETOROLAC TROMETHAMINE 30 MG: 30 INJECTION, SOLUTION INTRAMUSCULAR; INTRAVENOUS at 09:48

## 2018-08-15 RX ADMIN — OXYCODONE HYDROCHLORIDE 5 MG: 5 TABLET ORAL at 16:22

## 2018-08-15 RX ADMIN — NEOSTIGMINE METHYLSULFATE 3.5 MG: 1 INJECTION INTRAVENOUS at 10:00

## 2018-08-15 RX ADMIN — Medication 1 MG: at 14:13

## 2018-08-15 RX ADMIN — LORAZEPAM 1 MG: 2 INJECTION INTRAMUSCULAR; INTRAVENOUS at 21:16

## 2018-08-15 RX ADMIN — ENOXAPARIN SODIUM 40 MG: 100 INJECTION SUBCUTANEOUS at 07:17

## 2018-08-15 RX ADMIN — Medication 10 ML: at 21:20

## 2018-08-15 RX ADMIN — FENTANYL CITRATE 50 MCG: 50 INJECTION, SOLUTION INTRAMUSCULAR; INTRAVENOUS at 07:43

## 2018-08-15 RX ADMIN — HYDROMORPHONE HYDROCHLORIDE 0.5 MG: 2 INJECTION, SOLUTION INTRAMUSCULAR; INTRAVENOUS; SUBCUTANEOUS at 09:45

## 2018-08-15 NOTE — PERIOP NOTES
TRANSFER - OUT REPORT:    Verbal report given to EmmanuelleRNnamjorge) on Parvez Luz  being transferred to [facility](unit) for routine post - op       Report consisted of patients Situation, Background, Assessment and   Recommendations(SBAR). Time Pre op antibiotic given:7:45 am Ancef  Anesthesia Stop time: 10:34  Ramirez Present on Transfer to floor:no  Order for Ramirez on Chart:no  Discharge Prescriptions with Chart:no    Information from the following report(s) SBAR, Kardex, OR Summary, Procedure Summary, Intake/Output and MAR was reviewed with the receiving nurse. Opportunity for questions and clarification was provided. Is the patient on 02? YES       L/Min        Other     Is the patient on a monitor? NO    Is the nurse transporting with the patient? NO    Surgical Waiting Area notified of patient's transfer from PACU?  YES      The following personal items collected during your admission accompanied patient upon transfer:   Dental Appliance:    Vision:    Hearing Aid:    Jewelry:    Clothing: Clothing: Other (comment) (bag of clothes returned to bedside)  Other Valuables:    Valuables sent to safe:

## 2018-08-15 NOTE — PERIOP NOTES
Endoscope was pre-cleaned at bedside immediately following procedure by Ivanna Lafleur . Button lot # [id number].  (if applicable)  Scope # [id number]

## 2018-08-15 NOTE — OP NOTES
Patient ID:   Name: Kasandra Templeton   Medical Record Number: [id number]   YOB: 1975    Date of Surgery: 8/15/2018     Pre-operative Diagnosis: MORBID OBESITY    Post-operative Diagnosis: MORBID OBESITY    Prcoedure:  1) Laparoscopic Joey-en-Y Gastric Bypass    2) Intra-operative Endoscopy    Surgeon: Eloisa Reeves MD    Assistant:  JOSUE Beach (a PA was required due to complexity of the case)       Findings:  Small air leak on the anterior wall of the gastrojejunal anastomosis, repaired with 2-0 silk, no leak after repair  150 cm Joey Limb  50 cm  Biliopancreatic limb      Estimated Blood Loss:  minimal           Drains: 23 F Jerry drain at the anastomosis        Specimens: small bowel and segment of stomach    Indications: Body mass index is 35.15 kg/(m^2). PCOS    Procedure Details: The patient was seen in the Holding Room. The risks, benefits, complications, treatment options, and expected outcomes were discussed with the patient. The patient was then given pre-operative antibiotics and per-operative Lovenox for DVT prophylaxis. After informed consent was performed, patient was taken to the operating room and was placed supine in the operating table. After establishing general anesthesia, a brown catheter was inserted under sterile conditions. A footboard was then placed at  the end of the bed, and his feet padded with egg crate. Sequential compression devices were then placed on his legs. The abdomen was then prepped and draped in standard surgical fashion. A 12-mm transverse incision was made, 15 cm from the xiphoid and 2 cm to the left of midline. Prior to making the incision, this area was anesthetized with 2 mL of 0.5%  Marcaine plain. The incision was then made with an 11 blade scalpel, and  then a 12-mm Xcel trocar containing a 10-mm 0-degree laparoscope was then  used to dissect through all layers of the abdominal wall under direct  laparoscopic vision.  Entry into the abdominal cavity was confirmed  visually. Insufflation was then provided through this trocar to a pressure  of 15 mmHg. The patient tolerated insufflation well, and there were no  apparent complications. Attention was focused on the left lobe of the liver at first.  The patient's left lobe of the liver appeared to have responded well to the  liver shrinking diet. There was no evidence of hepatomegaly. Next,  360-degree evaluation of the abdomen was performed. There were no  peritoneal implants, and the patient had a very large carpet of omentum. Attention was 1st focused in the right upper quadrant, where a 12-mm Xcel  trocar was placed under direct laparoscopic vision followed by a 2nd 5-mm  trocar. Then, a 15 mm Xcel trocar was placed in the left mid abdomen, and  a 5-mm trocar placed in the left upper quadrant. Next, the patient's  omentum was mobilized and retracted towards its head. Once the edge of the  omentum was identified, it was held up towards the anterior abdominal wall,  and the omentum was divided starting from the edge heading towards the  colon in the area of the presumed ligament of Treitz. Care was taken not to injury the colon with this division. The transverse colon was then grasped and elevated  towards the anterior abdominal wall. The ligament of Treitz was then  identified. Just to the right of the ligament of Treitz, a 2-0 silk stitch  was passed through the retroperitoneal fat and peritoneum, and then, the 2  free edges of this stitch including the needle were placed in the 7 o'clock  position. Next, the small bowel was run in a clockwise direction for 50  cm. At this point, the bowel was elevated between 2 graspers, and a linear ALONDRA  stapler of the [employer] Xcel  with a white load was then used to  divide the bowel. The mesentery of the bowel at this location was divided  with the Harmonic scalpel for approximately 4.5 cm.  Once the mesentery was  divided, it was inspected. There was no evidence of any bleeding. The  assistant held the free end of the proximal bowel, and the free end of the  distal bowel was then run in a counterclockwise direction for 150 cm. At  the 150-cm ismael, the 2 ends of the bowel were placed [id number] on the  antimesenteric border and held in place by the assistant. A 2-0 silk  stitch was then used to connect the 2 limbs of bowel at their  antimesenteric border. Next, a Harmonic scalpel was used to make an  enterotomy in each limb of the bowel, and then, the [employer] stapler with a  white load was then used to create the jejunojejunal anastomosis. After  firing the stapler, the stapler was removed. The common enterotomy was  inspected with the laparoscope. There was no evidence of any bleeding from  the mucosal side of the staple line. The common enterotomy was then closed  with a running 2-0 silk suture. Once the common enterotomy was closed, the  bowel was then retracted to the patient's left side of his abdominal wall  and thus exposing the jejunojejunal anastomotic mesenteric defect. The  Joey limb was formed into a 'lazy-C' across the patient's abdomen, and the  distal end brought up to the proximal stomach  to ensure that it  was not under any tension in reaching the upper part of the stomach. Next,  the jejunojejunal mesenteric defect was closed with a running 2-0 silk  suture. Once this was completed, attention was then focused on the upper  abdomen. The patient was placed in deep reverse Trendelenburg position. A Vidal  retractor was inserted through an epigastric incision under direct  laparoscopic vision and placed under the left lobe of the liver. Once the  left lobe of the liver was retracted adequately with the [facility]  retractor, it was locked in place.  Next, the fundus of the stomach was  grasped and retracted inferiorly, and a blunt dissector was used to create  a plane between the left crura of the diaphragm and the angle of [facility]. Once  yellow retroperitoneal fat was visualized, this dissection was felt to be  completed. Next, a calibration balloon was inflated by Anesthesia within  the stomach with 30 mL. The balloon was then retracted to the esophageal  hiatus, at which point Anesthesia did experience some resistance. There  was no evidence of any significant hiatal hernia. The bottom edge of the  balloon was identified within the stomach on the lesser curve, and the area  of the bottom edge of the balloon was marked by making a small opening in  the lesser curve fat to identify the 30-mL ismael of the gastric pouch. The  balloon was then deflated, and the entire calibration balloon was removed  from the patient's stomach by Anesthesia. Next, the anvil of a 25-mm circular stapler was inserted through the 15-mm trocar site. The anvil contained the blue sharp anvil guide through which a 2-0 silk suture had  been passed through the suture hole and tied as a loose lasso. This anvil  was then oriented along the patient's long axis and place alongside the  greater curvature of the stomach. The greater curvature of the stomach was  then elevated between 2 graspers, and a gastrotomy created with the  Harmonic scalpel. Once the gastrotomy was of adequate size to accommodate  the anvil, the anvil was inserted into the stomach. This was done by using  a dolphin-tip grasper. The distal end of the suture through the anvil  guide was grasped with the dolphin-tip grasper. The anvil was then  advanced through the gastrotomy and through the body of the stomach. The  tip of the dolphin grasper was then used to indent the stomach at that  point chosen previously at the 30-mL ismael from the calibration balloon. At  this point, the dolphin grasper was then used to puncture through the anterior  stomach wall. Once the suture could be visualized, the dolphin grasper was  opened, and the suture grasped with grasper outside the stomach.  The  dolphin grasper was then removed from the abdomen, and using a  hand-over-hand fashion with blunt graspers, the anvil was brought out  through the 2nd gastrotomy and oriented with its post towards the anterior  abdominal wall. A 3-0 Vicryl was then used to create a pursestring suture  around the anvil stem. The pursestring was tied and tail cut short. Next, the  gastrotomy was closed with a running 2-0 silk suture. Attention was then  focused on the lesser curve of the stomach. Approximately 1.5 cm inferior to the post of the anvil, the perigastric fat  was opened with blunt spreading. Harmonic scalpel was used to control any  bleeding. Once the posterior stomach wall was visualized, a blunt grasper was  easily passed into the lesser sac. This blunt grasper was left in place,  and an echelon stapler with a gold load with Rosie-Strips reinforcement was  then inserted into the space and clamped across the stomach. Care was  taken to pull tension on the stomach to remove any folding or bunching of it. The stapler was then fired creating a transverse staple line that formed the bottom of the gastric pouch. Once this was completed, the anvil was held and retracted inferiorly and to the right to place the pouch under tension. Next, vertical staple fires were taken also with gold load using staple line reinforcement all the way up to the angle of His. Care was taken to ensure that the stomach was held under tension before firing each staple to make sure that no folding or doubling of the stomach wall occurred. The end of each staple lien was checked for crotch staples. If present these were removed before applying a subsequent staple load. Care was taken also to ensure that the staple line continued to migrate toward the angle of His and did not encroach upon the esophagus. Once the stomach was completely divided, the remaining staple line  reinforcement was cut with scissors.  The pouch staple line was then  inspected. There was no evidence of any bleeding, so attention was then  focused in the lower abdomen. The Joey limb was identified and brought out  to the upper abdomen. Care was taken to ensure not kinking or twisting of it mesentary. An enterotomy was made at the very distal end of the  Joey limb just next to the staple line. The handle of the 25-mm circular  stapler was inserted through the 15-mm trocar site, and the stapler handle  was advanced through the Joey limb, approximately 10 cm. The pin of the  handle was then brought out through the antimesenteric side of the Joey  limb and docked with the anvil. The anvil and the stapler were the closed slowly to ensure no other tissue was incorporated in the stapler and that the pouch did not twist, The stapler was then fired. The stapler was then opened three quarters of a  turn and removed carefully and slowly from the Joey limb and out through  the 15-mm trocar site. The doughnuts of the stapler were checked, and both  the proximal and distal doughnuts were very good specimen with complete  intact mucosa as well as muscularis and serosal layers. Next, the  mesentery of the distal Joey limb was divided with the Harmonic scalpel  just distal to the anastomosis. At this location, an echelon stapler with  a white load was used to amputate the candy cane with 1 fire of the staple. The amputated small bowel was then placed in an Endo Catch bag and removed  from the 15-mm trocar site. The patient was then returned to a supine position and the upper abdomen irrigated with a liter of irrigation solution to completely submerge the pouch and the anastomosis. A laparsocopic bowel clamp was then used to clamp the  distal Joey limb. An endoscope was inserted through the patient's mouth  and passed down the esophagus into the pouch. Once the endoscope was within the gastric pouch, insufflation was applied.  There was evidence of  A small leak on the anterior surface. This was repaired with a 2-0 seromuscular suture. This sealed the leak.  scope was then advanced through the anastomosis. The mucosal side of the  anastomosis was dry with no evidence of mucosal bleeding, and then, the  scope advanced into the proximal Joey limb. The proximal Joey limb  demonstrated significant enlargement with insufflation, and there was no  evidence of any bubbling around the gastrojejunal anastomosis. Therefore,  suction was applied to remove the insufflation, and the endoscope removed. The bowel clamp was then removed and the Joey limb was then retracted to the patient's left, and the previously  placed suture to the right of the ligament of Treitz was then used to close  Denderleeuw defect as a running suture to the base of the colon. The needle was then removed from the abdomen. A 19 [place] C Begun was then introduced into the abdomen  and brought out through the left upper quadrant 5-mm trocar site. The YELENA  was placed behind the anastomosis bluntly and placed on bulb suction. The  15-mm trocar site was then closed with a transfascial suture of 0 Vicryl  using an Endo Close device under direct laparoscopic vision. The remaining insufflation was then relieved. All trocars were removed, and the skin at all trocar sites was closed with 4-0 Monocryl and covered with Dermabond. The drain was sutured in place with a 2-0 nylon. The patient tolerated the procedure well. There were no apparent complications. Instruments and sponge counts were correct x2. The patient was extubated in the room and taken to the postanesthesia care  unit in stable condition. CC: JOSUE Velasquez    Complications:  Small air leak on the anterior wall of the gastrojejunal anastomosis, repaired with 2-0 silk, no leak after repair   patient tolerated the procedure well.            Signed By: Alaina Hampton MD

## 2018-08-15 NOTE — ROUTINE PROCESS
Patient: Angus Wheeler MRN: [id number]  SSN: [id number]   YOB: 1975  Age: 37 y.o. Sex: female     Patient is status post Procedure(s):  LAPAROSCOPIC GASTRIC BYPASS WITH EGD  ESOPHAGOGASTRODUODENOSCOPY (EGD). Surgeon(s) and Role:     * Amol Nunn MD - Primary    Local/Dose/Irrigation:  30 ml bupivacine 0.5% plain; normal saline 0.9% for internal irrigation. Peripheral IV 08/15/18 Right Hand (Active)          Gumaro-Gordon Drain 08/15/18 Anterior;Left;Upper Abdomen (Active)   Site Assessment Clean;Dry 8/15/2018 10:07 AM   Dressing Status Clean, dry, & intact 8/15/2018  9:59 AM   Status Draining 8/15/2018 10:07 AM   Drainage Color Serosanguinous 8/15/2018 10:07 AM      Airway - Endotracheal Tube 08/15/18 (Active)                   Dressing/Packing:  Wound Abdomen Anterior-DRESSING TYPE: Adhesive wound dressing (Band-Aid); Sutures; Topical skin adhesive/glue (skin glue for trocar sites (5) and band aid over drain site) (08/15/18 1011)  Splint/Cast:  ]    Other:  YELENA drain LUQ with band aid.

## 2018-08-15 NOTE — PROGRESS NOTES
Bedside and Verbal shift change report given to Garrett BARRETO (oncoming nurse) by Chelsea Ken RN (offgoing nurse). Report included the following information SBAR, Procedure Summary, Intake/Output and MAR.

## 2018-08-15 NOTE — INTERVAL H&P NOTE
H&P Update:  Jessica Hoffmann was seen and examined. History and physical has been reviewed. The patient has been examined.  There have been no significant clinical changes since the completion of the originally dated History and Physical.    Signed By: Diana Fountain MD     August 15, 2018 7:09 AM

## 2018-08-15 NOTE — PROGRESS NOTES
TRANSFER - IN REPORT:    Verbal report received from jay RN(name) on Keira Callaway  being received from [facility]) for routine progression of care      Report consisted of patients Situation, Background, Assessment and   Recommendations(SBAR). Information from the following report(s) SBAR, OR Summary, Procedure Summary, Intake/Output, MAR and Recent Results was reviewed with the receiving nurse. Opportunity for questions and clarification was provided. Assessment completed upon patients arrival to unit and care assumed.

## 2018-08-15 NOTE — DISCHARGE INSTRUCTIONS
DISCHARGE SUMMARY from Nurse    PATIENT INSTRUCTIONS:    After general anesthesia or intravenous sedation, for 24 hours or while taking prescription Narcotics:  · Limit your activities  · Do not drive and operate hazardous machinery  · Do not make important personal or business decisions  · Do  not drink alcoholic beverages  · If you have not urinated within 8 hours after discharge, please contact your surgeon on call. Report the following to your surgeon:  · Excessive pain, swelling, redness or odor of or around the surgical area  · Temperature over 100.5  · Nausea and vomiting lasting longer than 4 hours or if unable to take medications  · Any signs of decreased circulation or nerve impairment to extremity: change in color, persistent  numbness, tingling, coldness or increase pain  · Any questions    What to do at Home:    *  Please give a list of your current medications to your Primary Care Provider. *  Please update this list whenever your medications are discontinued, doses are      changed, or new medications (including over-the-counter products) are added. *  Please carry medication information at all times in case of emergency situations. These are general instructions for a healthy lifestyle:    No smoking/ No tobacco products/ Avoid exposure to second hand smoke  Surgeon General's Warning:  Quitting smoking now greatly reduces serious risk to your health. Obesity, smoking, and sedentary lifestyle greatly increases your risk for illness    A healthy diet, regular physical exercise & weight monitoring are important for maintaining a healthy lifestyle    You may be retaining fluid if you have a history of heart failure or if you experience any of the following symptoms:  Weight gain of 3 pounds or more overnight or 5 pounds in a week, increased swelling in our hands or feet or shortness of breath while lying flat in bed.   Please call your doctor as soon as you notice any of these symptoms; do not wait until your next office visit. Recognize signs and symptoms of STROKE:    F-face looks uneven    A-arms unable to move or move unevenly    S-speech slurred or non-existent    T-time-call 911 as soon as signs and symptoms begin-DO NOT go       Back to bed or wait to see if you get better-TIME IS BRAIN. Warning Signs of HEART ATTACK     Call 911 if you have these symptoms:   Chest discomfort. Most heart attacks involve discomfort in the center of the chest that lasts more than a few minutes, or that goes away and comes back. It can feel like uncomfortable pressure, squeezing, fullness, or pain.  Discomfort in other areas of the upper body. Symptoms can include pain or discomfort in one or both arms, the back, neck, jaw, or stomach.  Shortness of breath with or without chest discomfort.  Other signs may include breaking out in a cold sweat, nausea, or lightheadedness. Don't wait more than five minutes to call 911 - MINUTES MATTER! Fast action can save your life. Calling 911 is almost always the fastest way to get lifesaving treatment. Emergency Medical Services staff can begin treatment when they arrive -- up to an hour sooner than if someone gets to the hospital by car. The discharge information has been reviewed with the patient and spouse. The patient and spouse verbalized understanding. Discharge medications reviewed with the patient and spouse and appropriate educational materials and side effects teaching were provided. ___________________________________________________________________________________________________________________________________[facility]   Gastric Bypass Instructions    Procedure:  Laparoscopic gastric bypass  You have  a 2 week follow-up appointment.     Future Appointments  Date Time Provider Terrance Velardeisti   8/30/2018 9:00 AM Radha Mccann MD [address] [facility]   9/13/2018 9:00 AM Radha Mccann MD [facility]       Office address is:    Jj Ya MD, PhD, FACS  General Surgery at [address] [place] [address] Tunde Gle[place]all, [facility] [address]  [phone]  Fax [phone]    Please remember that you are on ONLY LIQUIDS for the first 2 weeks after surgery until you are seen in our office. You should be drinking plenty of no calorie, non carbonated liquids through out the day and your meals are going to be a shake, the same type you did for your 2 week pre-op diet. Do not advance your diet until you are seen in our office. Please take your medication for nausea if needed. Laparoscopic Joey-en-Y Gastric Bypass: What to Expect at Home  Your Recovery  A Joey-en-Y (say \"mitchell-en-why\") gastric bypass is surgery to make the stomach smaller and change the connection between the stomach and the intestines. It is done to help people lose weight. The surgery limits the amount of food the stomach can hold. This helps you eat less and feel full sooner. The cuts (incisions) the doctor made in your belly will probably be sore for several days after the surgery. You probably will lose weight very quickly in the first few months after surgery. As time goes on, your weight loss will slow down. You can expect most of your weight loss to happen in the first 12 months after your surgery. You will have regular doctor's appointments during this time to check how you are doing. It is important to think of this surgery as a tool to help you lose weight. It is not an instant fix. You will still need to eat a healthy diet and get regular exercise. This will help you reach your weight goal and avoid regaining the weight you lose. It is common to have many different emotions after this surgery. You may feel happy or excited as you begin to lose weight. But you may also feel overwhelmed or frustrated by the changes that you have to make in your diet, activity, and lifestyle.  Talk with your doctor if you have concerns or questions. This care sheet gives you a general idea about how long it will take for you to recover. But each person recovers at a different pace. Follow the steps below to get better as quickly as possible. How can you care for yourself at home? Activity  · Rest when you feel tired. Getting enough sleep will help you recover. · Try to walk each day. Start by walking a little more than you did the day before. Bit by bit, increase the amount you walk. Walking boosts blood flow and helps prevent pneumonia and constipation. · Avoid strenuous activities, such as bicycle riding, jogging, weight lifting, or aerobic exercise, until your doctor says it is okay. · Until your doctor says it is okay, avoid lifting anything that would make you strain. This may include heavy grocery bags and milk containers, a heavy briefcase or backpack, cat litter or dog food bags, a vacuum , or a child. · Hold a pillow over your incisions when you cough or take deep breaths. This will support your belly and decrease your pain. · Do breathing exercises at home as instructed by your doctor. This will help prevent pneumonia. · You can drive when you are no longer taking narcotic pain medications and feel comfortable sitting in a car. · You will probably need to take 2 to 4 weeks off from work. It depends on the type of work you do and how you feel. You will probably return to normal activities within 3 to 5 weeks. · You may shower, if your doctor okays it. Pat the incisions dry. Do not take a bath for the first 2 weeks, or until your doctor tells you it is okay. Diet  · Your doctor will give you specific instructions about what to eat after the surgery. For the first 2 weeks, you will need to follow a liquid diet only. DO NOT ADVANCE TO SOFT FOOD UNTIL CLEARED BY YOUR DOCTOR.   · Your doctor may recommend that you work with a dietitian to plan healthy meals that give you enough protein, vitamins, and minerals while you are losing weight. Even with a healthy diet, you probably will need to take vitamin and mineral supplements for the rest of your life. · At first you may feel full after just a few sips of water or other liquid. It is important to try to sip water throughout the day to avoid becoming dehydrated. · You may notice that your bowel movements are not regular right after your surgery. This is common. Try to avoid constipation and straining with bowel movements. · Sometimes the stomach empties food into the small intestine too quickly. This is called dumping syndrome. It can cause diarrhea and make you feel faint, shaky, and nauseated. It also can make it hard for your body to get enough nutrition. ¨ High-sugar foods such as desserts, soda pop, and fruit juices are most likely to cause dumping syndrome. Avoid high-sugar foods, or use products that have artificial sweeteners if sugar gives you a problem. ¨ Do not drink liquids within a half hour before eating and up to an hour after eating. Liquids move food even more quickly into the small intestine. Quick emptying of the stomach increases the chance of diarrhea. ¨ Eat slowly. Try to chew each bite about 20 times. Allow 20 to 30 minutes for each meal.  ¨ Eat 5 or 6 small meals or snacks a day. This may keep you from feeling too full after eating and may reduce problems with diarrhea and dumping syndrome. Medicines  · Take pain medicines exactly as directed. ¨ If the doctor gave you a prescription medicine for pain, take it as prescribed. ¨ If you are not taking a prescription pain medicine, ask your doctor if you can take an over-the-counter medicine. ¨ Do not take two or more pain medicines at the same time unless the doctor told you to. Many pain medicines have acetaminophen, which is Tylenol. Too much acetaminophen (Tylenol) can be harmful.   · If you think your pain medicine is making you sick to your stomach:  ¨ Take your medicine after meals (unless your doctor has told you not to). Incision care  · Your incisions have a waterproof clue dressing on them that will peel off in 2 weeks. · Wash the area daily with warm, soapy water, and pat it dry. Other cleaning products, such as hydrogen peroxide, can make the wound heal more slowly. You may cover the area with a gauze bandage if it weeps or rubs against clothing. Change the bandage every day. · Keep the area clean and dry. Follow-up care is a key part of your treatment and safety. Be sure to make and go to all appointments, and call your doctor if you are having problems. Itâs also a good idea to know your test results and keep a list of the medicines you take. When should you call for help? Call 911 anytime you think you may need emergency care. For example, call if:  · You pass out (lose consciousness). · You have severe trouble breathing. · You have sudden chest pain and shortness of breath, or you cough up blood. · You have severe belly pain. Call your doctor now or seek immediate medical care if:  · You are sick to your stomach or cannot keep fluids down. · You have pain that does not get better after you take pain medicine. · You have a fever over 101°F.  · You have loose stitches, or any of your incisions come open. · Bright red blood has soaked through the bandages over your incisions. · You have signs of infection, such as:  ¨ Increased pain, swelling, warmth, or redness. ¨ Red streaks leading from the incisions. ¨ Pus draining from the incision. ¨ Swollen lymph nodes in your neck, armpits, or groin. ¨ A fever. · You have signs of needing more fluids. You have sunken eyes and a dry mouth, and you pass only a little dark urine. Watch closely for changes in your health, and be sure to contact your doctor if you have any problems. Where can you learn more? Go to [employer].   Enter Y354 in the search box to learn more about \"Laparoscopic Joey-en-Y Gastric Bypass: What to Expect at Home. \"   © 4143-1987 [employer]. Care instructions adapted under license by [employer] (which disclaims liability or warranty for this information). This care instruction is for use with your licensed healthcare professional. If you have questions about a medical condition or this instruction, always ask your healthcare professional. Russella Zainab any warranty or liability for your use of this information.

## 2018-08-15 NOTE — PROGRESS NOTES
Problem: Falls - Risk of  Goal: *Absence of Falls  Document Melvina Fall Risk and appropriate interventions in the flowsheet. Outcome: Progressing Towards Goal  Fall Risk Interventions:            Medication Interventions: Evaluate medications/consider consulting pharmacy, Patient to call before getting OOB, Teach patient to arise slowly, Utilize gait belt for transfers/ambulation                  Comments: Pt is up and walking. Managing pain with scheduled and prn pain meds.  Pt taught on the use of incentive spirometer

## 2018-08-15 NOTE — PROGRESS NOTES
Problem: Laparoscopic Gastric Bypass:Day of Surgery  Goal: Activity/Safety  Outcome: Progressing Towards Goal  Patient ambulating in hallway with steady gait independently

## 2018-08-15 NOTE — ANESTHESIA POSTPROCEDURE EVALUATION
Post-Anesthesia Evaluation and Assessment    Patient: Bree Queen MRN: [id number]  SSN: [id number]    YOB: 1975  Age: 37 y.o. Sex: female       Cardiovascular Function/Vital Signs  Visit Vitals    /47    Pulse 65    Temp 36.6 °C (97.8 °F)    Resp 14    Ht 5' 0.5\" (1.537 m)    Wt 83 kg (183 lb)    SpO2 97%    BMI 35.15 kg/m2       Patient is status post general anesthesia for Procedure(s):  LAPAROSCOPIC GASTRIC BYPASS WITH EGD  ESOPHAGOGASTRODUODENOSCOPY (EGD). Nausea/Vomiting: None    Postoperative hydration reviewed and adequate. Pain:  Pain Scale 1: FLACC (08/15/18 1030)  Pain Intensity 1: 0 (08/15/18 1030)   Managed    Neurological Status:   Neuro (WDL): Exceptions to WDL (08/15/18 1030)  Neuro  Neurologic State: Sleeping (08/15/18 1030)   At baseline    Mental Status and Level of Consciousness: Alert and oriented     Pulmonary Status:   O2 Device: Nasal cannula (08/15/18 1030)   Adequate oxygenation and airway patent    Complications related to anesthesia: None    Post-anesthesia assessment completed.  No concerns    Signed By: Lina Fatima DO     August 15, 2018

## 2018-08-15 NOTE — ANESTHESIA PREPROCEDURE EVALUATION
Anesthetic History   No history of anesthetic complications            Review of Systems / Medical History  Patient summary reviewed, nursing notes reviewed and pertinent labs reviewed    Pulmonary  Within defined limits                 Neuro/Psych             Comments: ULISSES.  CARPAL TUNNEL  Chronic pain (G89.29) Cardiovascular                  Exercise tolerance: >4 METS     GI/Hepatic/Renal               Comments: History of esophageal dilatation (F79.501) Endo/Other    Diabetes    Obesity     Other Findings   Comments: Denies pregnancy           Physical Exam    Airway  Mallampati: IV  TM Distance: 4 - 6 cm  Neck ROM: normal range of motion   Mouth opening: Normal     Cardiovascular  Regular rate and rhythm,  S1 and S2 normal,  no murmur, click, rub, or gallop  Rhythm: regular  Rate: normal         Dental  No notable dental hx       Pulmonary  Breath sounds clear to auscultation               Abdominal  GI exam deferred       Other Findings            Anesthetic Plan    ASA: 3  Anesthesia type: general          Induction: Intravenous  Anesthetic plan and risks discussed with: Patient      [facility]

## 2018-08-15 NOTE — IP AVS SNAPSHOT
[address] [facility] [address] [place] [address] 
[phone] Patient: Kasandra Templeton MRN: [id number] FRF:[id number] About your hospitalization You were admitted on:  August 15, 2018 You last received care in the:  [facility] 4 SURG/BARIATRICS You were discharged on:  August 17, 2018 Why you were hospitalized Your primary diagnosis was:  Not on File Your diagnoses also included:  Obesity Follow-up Information Follow up With Details Comments Contact Info JOSUE Martinez   [address] [place] [address] 
[phone] Your Scheduled Appointments Thursday August 30, 2018  9:00 AM EDT  
POST OP 10 MIN with Eloisa Reeves MD  
[id number] [facility] Surgery [facility]) [address] [place] [address]  
[phone] Thursday September 13, 2018  9:00 AM EDT  
POST OP 10 MIN with Eloisa Reeves MD  
[id number] [facility] Surgery [facility]) [address] [place] [address]  
[phone] Discharge Orders None A check ismael indicates which time of day the medication should be taken. My Medications START taking these medications Instructions Each Dose to Equal  
 Morning Noon Evening Bedtime LORazepam 1 mg tablet Commonly known as:  ATIVAN Your last dose was: Your next dose is: Take 1 Tab by mouth every four (4) hours as needed for Anxiety. Max Daily Amount: 6 mg.  
 1 mg  
    
   
   
   
  
 oxyCODONE IR 5 mg immediate release tablet Commonly known as:  Pecos Salen Your last dose was: Your next dose is: Take 1 Tab by mouth every four (4) hours as needed. Max Daily Amount: 30 mg.  
 5 mg CONTINUE taking these medications Instructions Each Dose to Equal  
 Morning Noon Evening Bedtime APIDRA U-100 INSULIN 100 unit/mL injection Generic drug:  insulin glulisine U-100 Your last dose was: Your next dose is:    
   
   
 15 Units by SubCUTAneous route Before breakfast, lunch, and dinner. 15 Units  
    
   
   
   
  
 aspirin delayed-release 81 mg tablet Your last dose was: Your next dose is: Take  by mouth daily. ergocalciferol 50,000 unit capsule Commonly known as:  ERGOCALCIFEROL Your last dose was: Your next dose is: Take 1 Cap by mouth every Monday and Friday. [id number] Units  
    
   
   
   
  
 glipiZIDE 10 mg tablet Commonly known as:  Maxcine Kiser Your last dose was: Your next dose is: Take 10 mg by mouth daily. 10 mg Iron 325 mg (65 mg iron) tablet Generic drug:  ferrous sulfate Your last dose was: Your next dose is: Take  by mouth Daily (before breakfast). JARDIANCE 25 mg tablet Generic drug:  empagliflozin Your last dose was: Your next dose is: Take 25 mg by mouth daily. 25 mg  
    
   
   
   
  
 LANTUS U-100 INSULIN 100 unit/mL injection Generic drug:  insulin glargine Your last dose was: Your next dose is:    
   
   
 30 Units by SubCUTAneous route nightly. 30 Units  
    
   
   
   
  
 lisinopril 5 mg tablet Commonly known as:  Oscar Hu Your last dose was: Your next dose is: Take 5 mg by mouth nightly. 5 mg  
    
   
   
   
  
 metFORMIN 500 mg tablet Commonly known as:  GLUCOPHAGE Your last dose was: Your next dose is: Take 1,000 mg by mouth two (2) times daily (with meals). 1000 mg Omeprazole delayed release 20 mg tablet Commonly known as:  PRILOSEC D/R Your last dose was: Your next dose is: Take 1 Tab by mouth daily.   
 20 mg  
    
   
   
   
 ondansetron 4 mg disintegrating tablet Commonly known as:  ZOFRAN ODT Your last dose was: Your next dose is: Take 1 Tab by mouth every six (6) hours as needed for Nausea. 4 mg  
    
   
   
   
  
 rosuvastatin 20 mg tablet Commonly known as:  CRESTOR Your last dose was: Your next dose is: Take 20 mg by mouth nightly. 20 mg Where to Get Your Medications Information on where to get these meds will be given to you by the nurse or doctor. ! Ask your nurse or doctor about these medications LORazepam 1 mg tablet  
 oxyCODONE IR 5 mg immediate release tablet Opioid Education Prescription Opioids: What You Need to Know: 
 
 
After general anesthesia or intravenous sedation, for 24 hours or while taking prescription Narcotics: · Limit your activities · Do not drive and operate hazardous machinery · Do not make important personal or business decisions · Do  not drink alcoholic beverages · If you have not urinated within 8 hours after discharge, please contact your surgeon on call. Report the following to your surgeon: 
· Excessive pain, swelling, redness or odor of or around the surgical area · Temperature over 100.5 · Nausea and vomiting lasting longer than 4 hours or if unable to take medications · Any signs of decreased circulation or nerve impairment to extremity: change in color, persistent  numbness, tingling, coldness or increase pain · Any questions What to do at Home: *  Please give a list of your current medications to your Primary Care Provider. *  Please update this list whenever your medications are discontinued, doses are 
    changed, or new medications (including over-the-counter products) are added. *  Please carry medication information at all times in case of emergency situations. These are general instructions for a healthy lifestyle: No smoking/ No tobacco products/ Avoid exposure to second hand smoke Surgeon General's Warning:  Quitting smoking now greatly reduces serious risk to your health. Obesity, smoking, and sedentary lifestyle greatly increases your risk for illness A healthy diet, regular physical exercise & weight monitoring are important for maintaining a healthy lifestyle You may be retaining fluid if you have a history of heart failure or if you experience any of the following symptoms:  Weight gain of 3 pounds or more overnight or 5 pounds in a week, increased swelling in our hands or feet or shortness of breath while lying flat in bed. Please call your doctor as soon as you notice any of these symptoms; do not wait until your next office visit. Recognize signs and symptoms of STROKE: 
 
F-face looks uneven A-arms unable to move or move unevenly S-speech slurred or non-existent T-time-call 911 as soon as signs and symptoms begin-DO NOT go Back to bed or wait to see if you get better-TIME IS BRAIN. Warning Signs of HEART ATTACK Call 911 if you have these symptoms: 
? Chest discomfort. Most heart attacks involve discomfort in the center of the chest that lasts more than a few minutes, or that goes away and comes back. It can feel like uncomfortable pressure, squeezing, fullness, or pain. ? Discomfort in other areas of the upper body. Symptoms can include pain or discomfort in one or both arms, the back, neck, jaw, or stomach. ? Shortness of breath with or without chest discomfort. ? Other signs may include breaking out in a cold sweat, nausea, or lightheadedness. Don't wait more than five minutes to call [address]! Fast action can save your life. Calling 911 is almost always the fastest way to get lifesaving treatment. Emergency Medical Services staff can begin treatment when they arrive  up to an hour sooner than if someone gets to the hospital by car. The discharge information has been reviewed with the patient and spouse. The patient and spouse verbalized understanding. Discharge medications reviewed with the patient and spouse and appropriate educational materials and side effects teaching were provided. ___________________________________________________________________________________________________________________________________[facility] at [facility] 
 Gastric Bypass Instructions Procedure:  Laparoscopic gastric bypass You have  a 2 week follow-up appointment. Future Appointments Date Time Provider Terrance Madrigal 8/30/2018 9:00 AM Yo Daniels MD [facility]  
9/13/2018 9:00 AM Yo Daniels MD [address] [place] [address] Office address is: 
 
Yo Daniels MD, PhD, FACS General Surgery at [address] [place] [address], [address] 
[phone] Fax [phone] Please remember that you are on ONLY LIQUIDS for the first 2 weeks after surgery until you are seen in our office. You should be drinking plenty of no calorie, non carbonated liquids through out the day and your meals are going to be a shake, the same type you did for your 2 week pre-op diet. Do not advance your diet until you are seen in our office. Please take your medication for nausea if needed. Laparoscopic Joey-en-Y Gastric Bypass: What to Expect at Home Your Recovery A Joey-en-Y (say \"mitchell-en-why\") gastric bypass is surgery to make the stomach smaller and change the connection between the stomach and the intestines. It is done to help people lose weight. The surgery limits the amount of food the stomach can hold. This helps you eat less and feel full sooner. The cuts (incisions) the doctor made in your belly will probably be sore for several days after the surgery. You probably will lose weight very quickly in the first few months after surgery. As time goes on, your weight loss will slow down. You can expect most of your weight loss to happen in the first 12 months after your surgery. You will have regular doctor's appointments during this time to check how you are doing. It is important to think of this surgery as a tool to help you lose weight. It is not an instant fix. You will still need to eat a healthy diet and get regular exercise. This will help you reach your weight goal and avoid regaining the weight you lose. It is common to have many different emotions after this surgery. You may feel happy or excited as you begin to lose weight. But you may also feel overwhelmed or frustrated by the changes that you have to make in your diet, activity, and lifestyle. Talk with your doctor if you have concerns or questions. This care sheet gives you a general idea about how long it will take for you to recover. But each person recovers at a different pace. Follow the steps below to get better as quickly as possible. How can you care for yourself at home? Activity · Rest when you feel tired. Getting enough sleep will help you recover. · Try to walk each day. Start by walking a little more than you did the day before. Bit by bit, increase the amount you walk. Walking boosts blood flow and helps prevent pneumonia and constipation. · Avoid strenuous activities, such as bicycle riding, jogging, weight lifting, or aerobic exercise, until your doctor says it is okay. · Until your doctor says it is okay, avoid lifting anything that would make you strain.  This may include heavy grocery bags and milk containers, a heavy briefcase or backpack, cat litter or dog food bags, a vacuum , or a child. · Hold a pillow over your incisions when you cough or take deep breaths. This will support your belly and decrease your pain. · Do breathing exercises at home as instructed by your doctor. This will help prevent pneumonia. · You can drive when you are no longer taking narcotic pain medications and feel comfortable sitting in a car. · You will probably need to take 2 to 4 weeks off from work. It depends on the type of work you do and how you feel. You will probably return to normal activities within 3 to 5 weeks. · You may shower, if your doctor okays it. Pat the incisions dry. Do not take a bath for the first 2 weeks, or until your doctor tells you it is okay. Diet · Your doctor will give you specific instructions about what to eat after the surgery. For the first 2 weeks, you will need to follow a liquid diet only. DO NOT ADVANCE TO SOFT FOOD UNTIL CLEARED BY YOUR DOCTOR. · Your doctor may recommend that you work with a dietitian to plan healthy meals that give you enough protein, vitamins, and minerals while you are losing weight. Even with a healthy diet, you probably will need to take vitamin and mineral supplements for the rest of your life. · At first you may feel full after just a few sips of water or other liquid. It is important to try to sip water throughout the day to avoid becoming dehydrated. · You may notice that your bowel movements are not regular right after your surgery. This is common. Try to avoid constipation and straining with bowel movements. · Sometimes the stomach empties food into the small intestine too quickly. This is called dumping syndrome. It can cause diarrhea and make you feel faint, shaky, and nauseated. It also can make it hard for your body to get enough nutrition.  
¨ High-sugar foods such as desserts, soda pop, and fruit juices are most likely to cause dumping syndrome. Avoid high-sugar foods, or use products that have artificial sweeteners if sugar gives you a problem. ¨ Do not drink liquids within a half hour before eating and up to an hour after eating. Liquids move food even more quickly into the small intestine. Quick emptying of the stomach increases the chance of diarrhea. ¨ Eat slowly. Try to chew each bite about 20 times. Allow 20 to 30 minutes for each meal. 
¨ Eat 5 or 6 small meals or snacks a day. This may keep you from feeling too full after eating and may reduce problems with diarrhea and dumping syndrome. Medicines · Take pain medicines exactly as directed. ¨ If the doctor gave you a prescription medicine for pain, take it as prescribed. ¨ If you are not taking a prescription pain medicine, ask your doctor if you can take an over-the-counter medicine. ¨ Do not take two or more pain medicines at the same time unless the doctor told you to. Many pain medicines have acetaminophen, which is Tylenol. Too much acetaminophen (Tylenol) can be harmful. · If you think your pain medicine is making you sick to your stomach: 
¨ Take your medicine after meals (unless your doctor has told you not to). Incision care · Your incisions have a waterproof clue dressing on them that will peel off in 2 weeks. · Wash the area daily with warm, soapy water, and pat it dry. Other cleaning products, such as hydrogen peroxide, can make the wound heal more slowly. You may cover the area with a gauze bandage if it weeps or rubs against clothing. Change the bandage every day. · Keep the area clean and dry. Follow-up care is a key part of your treatment and safety. Be sure to make and go to all appointments, and call your doctor if you are having problems. Itâs also a good idea to know your test results and keep a list of the medicines you take. When should you call for help? Call 911 anytime you think you may need emergency care.  For example, call if: 
· You pass out (lose consciousness). · You have severe trouble breathing. · You have sudden chest pain and shortness of breath, or you cough up blood. · You have severe belly pain. Call your doctor now or seek immediate medical care if: 
· You are sick to your stomach or cannot keep fluids down. · You have pain that does not get better after you take pain medicine. · You have a fever over 101°F. 
· You have loose stitches, or any of your incisions come open. · Bright red blood has soaked through the bandages over your incisions. · You have signs of infection, such as: 
¨ Increased pain, swelling, warmth, or redness. ¨ Red streaks leading from the incisions. ¨ Pus draining from the incision. ¨ Swollen lymph nodes in your neck, armpits, or groin. ¨ A fever. · You have signs of needing more fluids. You have sunken eyes and a dry mouth, and you pass only a little dark urine. Watch closely for changes in your health, and be sure to contact your doctor if you have any problems. Where can you learn more? Go to [employer]. Enter Y354 in the search box to learn more about \"Laparoscopic Joey-en-Y Gastric Bypass: What to Expect at Home. \"  
© 0743-4436 Healthwise, Incorporated. Care instructions adapted under license by [employer] (which disclaims liability or warranty for this information). This care instruction is for use with your licensed healthcare professional. If you have questions about a medical condition or this instruction, always ask your healthcare professional. Eddie Zainab any warranty or liability for your use of this information. Introducing \[facility]\"" & HEALTH SERVICES! [employer] introduces Last 2 Left patient portal. Now you can access parts of your medical record, email your doctor's office, and request medication refills online. 1. In your internet browser, go to [email or web address] 2. Click on the First Time User? Click Here link in the Sign In box. You will see the New Member Sign Up page. 3. Enter your [employer] Access Code exactly as it appears below. You will not need to use this code after youve completed the sign-up process. If you do not sign up before the expiration date, you must request a new code. · [employer] Access Code: [facility] Expires: 10/15/2018  2:02 PM 
 
4. Enter the last four digits of your Social Security Number (xxxx) and Date of Birth (mm/dd/yyyy) as indicated and click Submit. You will be taken to the next sign-up page. 5. Create a [employer] ID. This will be your [employer] login ID and cannot be changed, so think of one that is secure and easy to remember. 6. Create a Kopjrat password. You can change your password at any time. 7. Enter your Password Reset Question and Answer. This can be used at a later time if you forget your password. 8. Enter your e-mail address. You will receive e-mail notification when new information is available in [address] 9. Click Sign Up. You can now view and download portions of your medical record. 10. Click the Download Summary menu link to download a portable copy of your medical information. If you have questions, please visit the Frequently Asked Questions section of the [employer] website. Remember, [employer] is NOT to be used for urgent needs. For medical emergencies, dial 911. Now available from your iPhone and Android! Introducing Jonny Potts As a Brynn Almanzar patient, I wanted to make you aware of our electronic visit tool called Jonny Potts. Brynn Almanzar 24/7 allows you to connect within minutes with a medical provider 24 hours a day, seven days a week via a mobile device or tablet or logging into a secure website from your computer. You can access Jonny Potts from anywhere in the [place].  
 
A virtual visit might be right for you when you have a simple condition and feel like you just dont want to get out of bed, or cant get away from work for an appointment, when your regular Lauren Vincent provider is not available (evenings, weekends or holidays), or when youre out of town and need minor care. Electronic visits cost only $49 and if the Lauren Vincent 24/7 provider determines a prescription is needed to treat your condition, one can be electronically transmitted to a nearby pharmacy*. Please take a moment to enroll today if you have not already done so. The enrollment process is free and takes just a few minutes. To enroll, please download the [employer] 24/7 sadiq to your tablet or phone, or visit [email or web address] to enroll on your computer. And, as an [address] [place] [address] patient with a [employer] account, the results of your visits will be scanned into your electronic medical record and your primary care provider will be able to view the scanned results. We urge you to continue to see your regular Lauren Vincent provider for your ongoing medical care. And while your primary care provider may not be the one available when you seek a Jonny Potts virtual visit, the peace of mind you get from getting a real diagnosis real time can be priceless. For more information on [employer], view our Frequently Asked Questions (FAQs) at [email or web address]. Sincerely, 
 
Camila Lozano MD 
Chief Medical Officer Jamin Santacruz *:  certain medications cannot be prescribed via [employer] Providers Seen During Your Hospitalization Provider Specialty Primary office phone Oliva Grewal MD General Surgery [phone] Your Primary Care Physician (PCP) Primary Care Physician Office Phone Office Fax Kayy Carlson [phone] You are allergic to the following No active allergies Recent Documentation Height Weight BMI OB Status Smoking Status 1.537 m 83.7 kg 35.44 kg/m2 Having regular periods Never Smoker Emergency Contacts Name Discharge Info Relation Home Work Mobile Lanre Garrett DISCHARGE CAREGIVER [3] Spouse [3] [phone] CtraZoey Valenzuela  Parent [1] [phone] Patient Belongings The following personal items are in your possession at time of discharge: 
  Dental Appliances: None  Visual Aid: None             Clothing: Other (comment) Discharge Instructions Attachments/References MEFS - OXYCODONE, RAPID RELEASE (ETH-OXYDOSE, OXY IR, ROXICODONE) - (BY MOUTH) (ENGLISH) MEFS - LORAZEPAM (ATIVAN, LORAZEPAM INTENSOL) - (BY MOUTH) (ENGLISH) Patient Handouts Oxycodone, Rapid Release (ETH-Oxydose, Oxy IR, Roxicodone) - (By mouth) Why this medicine is used:  
Treats moderate to severe pain. This medicine is a narcotic pain reliever. Contact a nurse or doctor right away if you have: 
· Fast or slow heart beat, shallow breathing, blue lips, skin or fingernails · Anxiety, restlessness, fever, sweating, muscle spasms, twitching, seeing or hearing things that are not there · Extreme weakness, shallow breathing, slow heartbeat · Severe confusion, lightheadedness, dizziness, fainting · Sweating or cold, clammy skin, seizures · Severe constipation, stomach pain, nausea, vomiting Common side effects: · Mild constipation · Sleepiness, tiredness © 2017 [address]  Information is for End User's use only and may not be sold, redistributed or otherwise used for commercial purposes. Lorazepam (Ativan, LORazepam Intensol) - (By mouth) Why this medicine is used:  
Treats anxiety. Contact a nurse or doctor right away if you have: · Slow heartbeat, trouble breathing or speaking · Extreme tiredness or weakness · Depression, confusion, thoughts of hurting yourself Common side effects: 
· Drowsiness, tiredness · Dizziness, clumsiness © 2017 [address] Information is for End User's use only and may not be sold, redistributed or otherwise used for commercial purposes. Please provide this summary of care documentation to your next provider. Signatures-by signing, you are acknowledging that this After Visit Summary has been reviewed with you and you have received a copy. Patient Signature:  ____________________________________________________________ Date:  ____________________________________________________________  
  
[facility] Provider Signature:  ____________________________________________________________ Date:  ____________________________________________________________

## 2018-08-15 NOTE — IP AVS SNAPSHOT
[facility] [facility] [address] [facility] 
[phone] Patient: Kasandra Templeton MRN: [id number] [facility]:[birth year] A check ismael indicates which time of day the medication should be taken. My Medications START taking these medications Instructions Each Dose to Equal  
 Morning Noon Evening Bedtime LORazepam 1 mg tablet Commonly known as:  ATIVAN Your last dose was: Your next dose is: Take 1 Tab by mouth every four (4) hours as needed for Anxiety. Max Daily Amount: 6 mg.  
 1 mg  
    
   
   
   
  
 oxyCODONE IR 5 mg immediate release tablet Commonly known as:  Wilmerding Salen Your last dose was: Your next dose is: Take 1 Tab by mouth every four (4) hours as needed. Max Daily Amount: 30 mg.  
 5 mg CONTINUE taking these medications Instructions Each Dose to Equal  
 Morning Noon Evening Bedtime APIDRA U-100 INSULIN 100 unit/mL injection Generic drug:  insulin glulisine U-100 Your last dose was: Your next dose is:    
   
   
 15 Units by SubCUTAneous route Before breakfast, lunch, and dinner. 15 Units  
    
   
   
   
  
 aspirin delayed-release 81 mg tablet Your last dose was: Your next dose is: Take  by mouth daily. ergocalciferol 50,000 unit capsule Commonly known as:  ERGOCALCIFEROL Your last dose was: Your next dose is: Take 1 Cap by mouth every Monday and Friday. [address]14 Units  
    
   
   
   
  
 glipiZIDE 10 mg tablet Commonly known as:  Rosa Isela Izquierdo Your last dose was: Your next dose is: Take 10 mg by mouth daily. 10 mg Iron 325 mg (65 mg iron) tablet Generic drug:  ferrous sulfate Your last dose was: Your next dose is: Take  by mouth Daily (before breakfast). JARDIANCE 25 mg tablet Generic drug:  empagliflozin Your last dose was: Your next dose is: Take 25 mg by mouth daily. 25 mg  
    
   
   
   
  
 LANTUS U-100 INSULIN 100 unit/mL injection Generic drug:  insulin glargine Your last dose was: Your next dose is:    
   
   
 30 Units by SubCUTAneous route nightly. 30 Units  
    
   
   
   
  
 lisinopril 5 mg tablet Commonly known as:  Merilynn Denver Your last dose was: Your next dose is: Take 5 mg by mouth nightly. 5 mg  
    
   
   
   
  
 metFORMIN 500 mg tablet Commonly known as:  GLUCOPHAGE Your last dose was: Your next dose is: Take 1,000 mg by mouth two (2) times daily (with meals). 1000 mg Omeprazole delayed release 20 mg tablet Commonly known as:  PRILOSEC D/R Your last dose was: Your next dose is: Take 1 Tab by mouth daily. 20 mg  
    
   
   
   
  
 ondansetron 4 mg disintegrating tablet Commonly known as:  ZOFRAN ODT Your last dose was: Your next dose is: Take 1 Tab by mouth every six (6) hours as needed for Nausea. 4 mg  
    
   
   
   
  
 rosuvastatin 20 mg tablet Commonly known as:  CRESTOR Your last dose was: Your next dose is: Take 20 mg by mouth nightly. 20 mg Where to Get Your Medications Information on where to get these meds will be given to you by the nurse or doctor. ! Ask your nurse or doctor about these medications LORazepam 1 mg tablet  
 oxyCODONE IR 5 mg immediate release tablet

## 2018-08-15 NOTE — H&P (VIEW-ONLY)
Bariatric Surgery History and Physical  Angel Jimenez is a 37 y.o. female Scheduled for Laparoscopic Gastric Bypass with Dr. Sterling Godinez on August 14, 2018 at [facility]. Patient comes in office today for history and physical, and to receive preoperative liver shrinking diet. Patient height is 5'0.5'', weight is 185.5 pounds, BMI is 35.63     Patient Active Problem List    Diagnosis Date Noted    Diabetes [facility])      Past Medical History:   Diagnosis Date    Chronic pain     ULISSES. HIPS    Diabetes ([address]     IDDM    History of esophageal dilatation 2008    Tuberculosis 2015    H/O POS. PPD - [place] NINE MOS. ANTIBX THERAPY      Past Surgical History:   Procedure Laterality Date    HX DILATION AND CURETTAGE  2003    HX ORTHOPAEDIC  2003    ULISSES. CARPAL TUNNEL    HX UROLOGICAL  01/2017    BLADDER SURGERY - MESH      Social History   Substance Use Topics    Smoking status: Never Smoker    Smokeless tobacco: Never Used    Alcohol use Yes      Comment: 2 DRINKS PER MONTH      Family History   Problem Relation Age of Onset    Cancer Mother      breast    Diabetes Father     Heart Disease Maternal Grandmother     Heart Disease Paternal Grandfather     Anesth Problems Neg Hx       Prior to Admission medications    Medication Sig Start Date End Date Taking? Authorizing Provider   ergocalciferol (ERGOCALCIFEROL) 50,000 unit capsule Take 1 Cap by mouth every Monday and Friday. 7/20/18  Yes Humberto Montelongo NP   Omeprazole delayed release (PRILOSEC D/R) 20 mg tablet Take 1 Tab by mouth daily. 7/19/18  Yes Humberto Montelongo NP   ondansetron (ZOFRAN ODT) 4 mg disintegrating tablet Take 1 Tab by mouth every six (6) hours as needed for Nausea. 7/19/18  Yes Humberto Montelongo NP   rosuvastatin (CRESTOR) 20 mg tablet Take 20 mg by mouth nightly. Yes Historical Provider   metFORMIN (GLUCOPHAGE) 500 mg tablet Take 1,000 mg by mouth two (2) times daily (with meals).    Yes Historical Provider   ferrous sulfate (IRON) 325 mg (65 mg iron) tablet Take  by mouth Daily (before breakfast). Yes Historical Provider   glipiZIDE (GLUCOTROL) 10 mg tablet Take 10 mg by mouth daily. Yes Historical Provider   aspirin delayed-release 81 mg tablet Take  by mouth daily. Yes Historical Provider   empagliflozin (JARDIANCE) 25 mg tablet Take  by mouth daily. Yes Historical Provider   lisinopril (PRINIVIL, ZESTRIL) 5 mg tablet Take 5 mg by mouth nightly. Yes Historical Provider   insulin glulisine (APIDRA) 100 unit/mL injection 15 Units by SubCUTAneous route Before breakfast, lunch, and dinner. Yes Historical Provider   insulin glargine (LANTUS) 100 unit/mL injection 30 Units by SubCUTAneous route nightly. Yes Historical Provider     No Known Allergies   JOSUE Parekh is primary care provider      HPI    Review of Systems   Constitutional: Negative for chills, fever and malaise/fatigue. HENT: Negative for congestion, hearing loss and nosebleeds. Eyes: Negative for blurred vision, double vision, photophobia, pain and discharge. Respiratory: Negative for cough, sputum production and shortness of breath. Cardiovascular: Negative for chest pain, palpitations and leg swelling. Gastrointestinal: Negative for abdominal pain, blood in stool, constipation, diarrhea, heartburn, nausea and vomiting. Genitourinary: Positive for dysuria. Negative for frequency, hematuria and urgency. Symptoms of yeast infection   Musculoskeletal: Positive for joint pain. Bilateral hip pain    Skin: Negative for itching and rash. Neurological: Negative for dizziness, seizures and loss of consciousness. Endo/Heme/Allergies: Does not bruise/bleed easily. Diagnosed with diabetes over 10 years ago. Blood glucose levels 130's-140's   Psychiatric/Behavioral: Negative for depression, hallucinations, memory loss, substance abuse and suicidal ideas. The patient is not nervous/anxious and does not have insomnia.         Physical Exam Constitutional: She is oriented to person, place, and time. She appears well-developed and well-nourished. Non-toxic appearance. No distress. HENT:   Head: Normocephalic and atraumatic. Head is without abrasion and without contusion. Right Ear: Hearing and external ear normal.   Left Ear: Hearing and external ear normal.   Nose: No nasal deformity or septal deviation. Mouth/Throat: Uvula is midline, oropharynx is clear and moist and mucous membranes are normal. She does not have dentures. Normal dentition. Eyes: Conjunctivae and lids are normal. Pupils are equal, round, and reactive to light. Neck: Trachea normal and normal range of motion. Carotid bruit is not present. Cardiovascular: Normal rate, regular rhythm, S1 normal, S2 normal, normal heart sounds and normal pulses. No murmur heard. Pulmonary/Chest: Effort normal and breath sounds normal. No respiratory distress. She has no decreased breath sounds. She has no wheezes. She has no rhonchi. She has no rales. She exhibits no laceration, no crepitus and no retraction. Abdominal: Soft. Bowel sounds are normal. She exhibits no distension. There is no tenderness. There is no rebound and no guarding. No hernia. Abdomen obese, non-tender, non-distended. No hernia/masses palpated   Musculoskeletal: Normal range of motion. She exhibits no edema. Lymphadenopathy:        Head (right side): No submental, no submandibular, no tonsillar, no preauricular and no posterior auricular adenopathy present. Head (left side): No submental, no submandibular, no tonsillar, no preauricular and no posterior auricular adenopathy present. She has no cervical adenopathy. Neurological: She is alert and oriented to person, place, and time. She has normal strength. No cranial nerve deficit or sensory deficit. Skin: Skin is warm and dry. No rash noted. No erythema. Nails show no clubbing. Psychiatric: She has a normal mood and affect.  Her speech is normal and behavior is normal.   Blood pressure 118/80, pulse (!) 108, temperature 97.8 °F (36.6 °C), temperature source Oral, resp. rate 18, height 5' 0.5\" (1.537 m), weight 185 lb 8 oz (84.1 kg), last menstrual period 07/06/2018, SpO2 97 %. ASSESSMENT and PLAN    Morbid obesity with body mass index of 35.63. Co-morbids listed above    Patient is scheduled for Laparoscopic Gastric Bypass with Dr. Maame Rodriguez on August 14, [facility] at [facility].Counseled patient in regard to post diet restrictions, follow- up office visits, follow- up care, and follow up medications. Prescriptions for Zofran and Omeprazole given. Reviewed available preop labs, Vitamin D low. Prescription Vitamin D started. Patient as received educational booklet and vitamin list. Patient to start liver shrinking diet on August 1, 2018. Patient to discontinue use of Metformin and Aspirin 1 week prior to surgery. Answered all questions and patient wishes to proceed.       Signed By: Anny Cummings NP     July 19, 2018      28 minutes was spent with patient

## 2018-08-16 LAB
ANION GAP SERPL CALC-SCNC: 12 MMOL/L (ref 5–15)
BUN SERPL-MCNC: 7 MG/DL (ref 6–20)
BUN/CREAT SERPL: 16 (ref 12–20)
CALCIUM SERPL-MCNC: 8.4 MG/DL (ref 8.5–10.1)
CHLORIDE SERPL-SCNC: 107 MMOL/L (ref 97–108)
CO2 SERPL-SCNC: 19 MMOL/L (ref 21–32)
CREAT SERPL-MCNC: 0.43 MG/DL (ref 0.55–1.02)
ERYTHROCYTE [DISTWIDTH] IN BLOOD BY AUTOMATED COUNT: 15 % (ref 11.5–14.5)
GLUCOSE BLD STRIP.AUTO-MCNC: 105 MG/DL (ref 65–100)
GLUCOSE BLD STRIP.AUTO-MCNC: 115 MG/DL (ref 65–100)
GLUCOSE BLD STRIP.AUTO-MCNC: 118 MG/DL (ref 65–100)
GLUCOSE BLD STRIP.AUTO-MCNC: 131 MG/DL (ref 65–100)
GLUCOSE SERPL-MCNC: 92 MG/DL (ref 65–100)
HCT VFR BLD AUTO: 37.9 % (ref 35–47)
HGB BLD-MCNC: 12.1 G/DL (ref 11.5–16)
MCH RBC QN AUTO: 25.7 PG (ref 26–34)
MCHC RBC AUTO-ENTMCNC: 31.9 G/DL (ref 30–36.5)
MCV RBC AUTO: 80.6 FL (ref 80–99)
NRBC # BLD: 0 K/UL (ref 0–0.01)
NRBC BLD-RTO: 0 PER 100 WBC
PLATELET # BLD AUTO: 318 K/UL (ref 150–400)
PMV BLD AUTO: 8.2 FL (ref 8.9–12.9)
POTASSIUM SERPL-SCNC: 3.8 MMOL/L (ref 3.5–5.1)
RBC # BLD AUTO: 4.7 M/UL (ref 3.8–5.2)
SERVICE CMNT-IMP: ABNORMAL
SODIUM SERPL-SCNC: 138 MMOL/L (ref 136–145)
WBC # BLD AUTO: 12.4 K/UL (ref 3.6–11)

## 2018-08-16 PROCEDURE — 85027 COMPLETE CBC AUTOMATED: CPT | Performed by: SURGERY

## 2018-08-16 PROCEDURE — 80048 BASIC METABOLIC PNL TOTAL CA: CPT | Performed by: SURGERY

## 2018-08-16 PROCEDURE — 36415 COLL VENOUS BLD VENIPUNCTURE: CPT | Performed by: SURGERY

## 2018-08-16 PROCEDURE — 74011250637 HC RX REV CODE- 250/637: Performed by: SURGERY

## 2018-08-16 PROCEDURE — 65660000000 HC RM CCU STEPDOWN

## 2018-08-16 PROCEDURE — 77010033678 HC OXYGEN DAILY

## 2018-08-16 PROCEDURE — 74011250636 HC RX REV CODE- 250/636: Performed by: SURGERY

## 2018-08-16 PROCEDURE — 82962 GLUCOSE BLOOD TEST: CPT

## 2018-08-16 PROCEDURE — 94760 N-INVAS EAR/PLS OXIMETRY 1: CPT

## 2018-08-16 RX ORDER — SODIUM CHLORIDE 9 MG/ML
75 INJECTION, SOLUTION INTRAVENOUS CONTINUOUS
Status: DISCONTINUED | OUTPATIENT
Start: 2018-08-16 | End: 2018-08-17 | Stop reason: HOSPADM

## 2018-08-16 RX ORDER — LORAZEPAM 1 MG/1
1 TABLET ORAL
Qty: 30 TAB | Refills: 0 | Status: SHIPPED | OUTPATIENT
Start: 2018-08-16 | End: 2018-10-20

## 2018-08-16 RX ORDER — KETOROLAC TROMETHAMINE 30 MG/ML
30 INJECTION, SOLUTION INTRAMUSCULAR; INTRAVENOUS EVERY 6 HOURS
Status: COMPLETED | OUTPATIENT
Start: 2018-08-16 | End: 2018-08-17

## 2018-08-16 RX ORDER — OXYCODONE HYDROCHLORIDE 5 MG/1
5 TABLET ORAL
Qty: 30 TAB | Refills: 0 | Status: SHIPPED | OUTPATIENT
Start: 2018-08-16

## 2018-08-16 RX ADMIN — SODIUM CHLORIDE 125 ML/HR: 900 INJECTION, SOLUTION INTRAVENOUS at 04:42

## 2018-08-16 RX ADMIN — KETOROLAC TROMETHAMINE 30 MG: 30 INJECTION, SOLUTION INTRAMUSCULAR at 09:24

## 2018-08-16 RX ADMIN — OXYCODONE HYDROCHLORIDE 5 MG: 5 TABLET ORAL at 06:29

## 2018-08-16 RX ADMIN — SODIUM CHLORIDE 75 ML/HR: 900 INJECTION, SOLUTION INTRAVENOUS at 17:48

## 2018-08-16 RX ADMIN — KETOROLAC TROMETHAMINE 30 MG: 30 INJECTION, SOLUTION INTRAMUSCULAR at 20:18

## 2018-08-16 RX ADMIN — OXYCODONE HYDROCHLORIDE 5 MG: 5 TABLET ORAL at 14:39

## 2018-08-16 RX ADMIN — OXYCODONE HYDROCHLORIDE 5 MG: 5 TABLET ORAL at 20:18

## 2018-08-16 RX ADMIN — KETOROLAC TROMETHAMINE 30 MG: 30 INJECTION, SOLUTION INTRAMUSCULAR at 14:39

## 2018-08-16 RX ADMIN — Medication 10 ML: at 06:00

## 2018-08-16 RX ADMIN — LORAZEPAM 1 MG: 2 INJECTION INTRAMUSCULAR; INTRAVENOUS at 04:09

## 2018-08-16 RX ADMIN — Medication 10 ML: at 13:34

## 2018-08-16 RX ADMIN — ROSUVASTATIN CALCIUM 20 MG: 10 TABLET, FILM COATED ORAL at 23:23

## 2018-08-16 RX ADMIN — LISINOPRIL 5 MG: 5 TABLET ORAL at 23:23

## 2018-08-16 RX ADMIN — Medication 5 ML: at 22:00

## 2018-08-16 RX ADMIN — PANTOPRAZOLE SODIUM 40 MG: 40 TABLET, DELAYED RELEASE ORAL at 06:29

## 2018-08-16 RX ADMIN — ENOXAPARIN SODIUM 40 MG: 100 INJECTION SUBCUTANEOUS at 09:24

## 2018-08-16 RX ADMIN — OXYCODONE HYDROCHLORIDE 5 MG: 5 TABLET ORAL at 00:31

## 2018-08-16 NOTE — PROGRESS NOTES
Problem: Patient Education: Go to Patient Education Activity  Goal: Patient/Family Education  Outcome: Resolved/Met Date Met: 08/16/18  NUTRITION     Chart reviewed. Post-op bariatric diet instruction completed. Will gladly follow up for additional questions as needed. Thank you.      Juan Ballard RD

## 2018-08-16 NOTE — PROGRESS NOTES
Bedside and Verbal shift change report given to [id number] (oncoming nurse) by Chelsy Yang RN (offgoing nurse). Report included the following information SBAR, OR Summary, Procedure Summary, MAR and Cardiac Rhythm NSR.

## 2018-08-16 NOTE — PROGRESS NOTES
Problem: Falls - Risk of  Goal: *Absence of Falls  Document Melvina Fall Risk and appropriate interventions in the flowsheet.    Outcome: Progressing Towards Goal  Fall Risk Interventions:            Medication Interventions: Patient to call before getting OOB, Teach patient to arise slowly

## 2018-08-16 NOTE — PROGRESS NOTES
Bedside and Verbal shift change report given to Madeline (oncoming nurse) by Merle (offgoing nurse). Report included the following information SBAR, Kardex, MAR and Recent Results.

## 2018-08-16 NOTE — PROGRESS NOTES
SURGERY PROGRESS NOTE      Admit Date: 8/15/2018    POD 1 Day Post-Op    Procedure: Procedure(s):  LAPAROSCOPIC GASTRIC BYPASS WITH EGD  ESOPHAGOGASTRODUODENOSCOPY (EGD)      Subjective:     Patient complains of back pain and pain at YELENA site. She denies nausea.    Tolerating ice      Objective:     Visit Vitals    /56 (BP 1 Location: Left arm, BP Patient Position: At rest)    Pulse 87    Temp 98.1 °F (36.7 °C)    Resp 19    Ht 5' 0.5\" (1.537 m)    Wt 187 lb 13.3 oz (85.2 kg)    LMP 2018    SpO2 95%    BMI 36.08 kg/m2        Temp (24hrs), Av.1 °F (36.7 °C), Min:97.3 °F (36.3 °C), Max:99 °F (37.2 °C)          1901 -  0700  In: 2000 [I.V.:2000]  Out: 1085 [Urine:700; Drains:185]    Physical Exam:    General:  alert, cooperative, no distress, appears stated age   Abdomen: soft, bowel sounds active, non-tender   Incision:   healing well, no drainage, no erythema, no hernia, no seroma, no swelling, no dehiscence, incision well approximated           Lab Results  Component Value Date/Time   WBC 12.4 (H) 2018 04:20 AM   HGB 12.1 2018 04:20 AM   HCT 37.9 2018 04:20 AM   PLATELET 831  04:20 AM   MCV 80.6 2018 04:20 AM     Lab Results  Component Value Date/Time   GFR est non-AA >60 2018 04:20 AM   GFR est AA >60 2018 04:20 AM   Creatinine 0.43 (L) 2018 04:20 AM   BUN 7 2018 04:20 AM   Sodium 138 2018 04:20 AM   Potassium 3.8 2018 04:20 AM   Chloride 107 2018 04:20 AM   CO2 19 (L) 2018 04:20 AM   Magnesium 1.8 2018 09:03 AM       Assessment:     Active Problems:    Obesity (8/15/2018)    following expected course    Plan:     Bariatric liquid diet  Decrease IVF  toradol for pain  OOB ambulating  Will D/C YELENA due to discomfort

## 2018-08-17 VITALS
TEMPERATURE: 98.2 F | OXYGEN SATURATION: 96 % | HEIGHT: 61 IN | RESPIRATION RATE: 17 BRPM | HEART RATE: 82 BPM | BODY MASS INDEX: 34.84 KG/M2 | SYSTOLIC BLOOD PRESSURE: 100 MMHG | DIASTOLIC BLOOD PRESSURE: 53 MMHG | WEIGHT: 184.53 LBS

## 2018-08-17 LAB
GLUCOSE BLD STRIP.AUTO-MCNC: 106 MG/DL (ref 65–100)
GLUCOSE BLD STRIP.AUTO-MCNC: 113 MG/DL (ref 65–100)
SERVICE CMNT-IMP: ABNORMAL
SERVICE CMNT-IMP: ABNORMAL

## 2018-08-17 PROCEDURE — 82962 GLUCOSE BLOOD TEST: CPT

## 2018-08-17 PROCEDURE — 74011250636 HC RX REV CODE- 250/636: Performed by: SURGERY

## 2018-08-17 PROCEDURE — 74011250637 HC RX REV CODE- 250/637: Performed by: SURGERY

## 2018-08-17 RX ADMIN — Medication 5 ML: at 06:00

## 2018-08-17 RX ADMIN — PANTOPRAZOLE SODIUM 40 MG: 40 TABLET, DELAYED RELEASE ORAL at 08:01

## 2018-08-17 RX ADMIN — LORAZEPAM 1 MG: 2 INJECTION INTRAMUSCULAR; INTRAVENOUS at 05:32

## 2018-08-17 RX ADMIN — KETOROLAC TROMETHAMINE 30 MG: 30 INJECTION, SOLUTION INTRAMUSCULAR at 08:17

## 2018-08-17 RX ADMIN — KETOROLAC TROMETHAMINE 30 MG: 30 INJECTION, SOLUTION INTRAMUSCULAR at 03:26

## 2018-08-17 RX ADMIN — ENOXAPARIN SODIUM 40 MG: 100 INJECTION SUBCUTANEOUS at 08:17

## 2018-08-17 NOTE — PROGRESS NOTES
Surgery Progress Note  Subjective:     Pt is POD#2  after laparoscopic gastric bypass procedure. No complaints. Today Pt's Pain is none  No SOB. No CP. Pt is ambulating. no nausea and no vomiting. no fever or chills. Pt is on Bariatric full liquid diet with good PO intake    Objective:     Patient Vitals for the past 8 hrs:   BP Temp Pulse Resp SpO2 Weight   08/17/18 0756 100/53 98.2 °F (36.8 °C) 82 17 96 % -   08/17/18 0329 143/71 98.3 °F (36.8 °C) 88 18 98 % -   08/17/18 0321 - - - - - 184 lb 8.4 oz (83.7 kg)        08/15 1901 - 08/17 0700  In: 1402.5 [P.O.:360; I.V.:1042.5]  Out: 345 [Urine:250; Drains:95]ip  Physical Exam:    General: alert, cooperative, no distress,   Cardiac: normal S1 and S2  Lungs: Normal chest wall and respirations. Clear to auscultation. Abdomen: soft, nondistended  Wounds:clean, dry        CBC: Lab Results   Component Value Date/Time    WBC 12.4 (H) 08/16/2018 04:20 AM    RBC 4.70 08/16/2018 04:20 AM    HGB 12.1 08/16/2018 04:20 AM    HCT 37.9 08/16/2018 04:20 AM    PLATELET 733 68/87/0360 04:20 AM     BMP: Lab Results   Component Value Date/Time    Glucose 92 08/16/2018 04:20 AM    Sodium 138 08/16/2018 04:20 AM    Potassium 3.8 08/16/2018 04:20 AM    Chloride 107 08/16/2018 04:20 AM    CO2 19 (L) 08/16/2018 04:20 AM    BUN 7 08/16/2018 04:20 AM    Creatinine 0.43 (L) 08/16/2018 04:20 AM    Calcium 8.4 (L) 08/16/2018 04:20 AM     CMP:  Lab Results   Component Value Date/Time    Glucose 92 08/16/2018 04:20 AM    Sodium 138 08/16/2018 04:20 AM    Potassium 3.8 08/16/2018 04:20 AM    Chloride 107 08/16/2018 04:20 AM    CO2 19 (L) 08/16/2018 04:20 AM    BUN 7 08/16/2018 04:20 AM    Creatinine 0.43 (L) 08/16/2018 04:20 AM    Calcium 8.4 (L) 08/16/2018 04:20 AM    Anion gap 12 08/16/2018 04:20 AM    BUN/Creatinine ratio 16 08/16/2018 04:20 AM    Alk.  phosphatase 65 07/19/2018 09:03 AM    Protein, total 7.6 07/19/2018 09:03 AM    Albumin 3.9 07/19/2018 09:03 AM    Globulin 3.7 07/19/2018 09:03 AM    A-G Ratio 1.1 07/19/2018 09:03 AM           Assessment:   Pt is POD #2 after Laparoscopic RNY Gastric Bypass  Stable    Plan: Tolerating diet with good PO intake  Cleared for discharge to home today  ad terrance  Pain management  GI and DVT prophylaxis  Roxicodone prescription on chart. Home medications reviewed  All instructions including diet, medications, activity and follow-up reviewed with pt.     Sonam Fung NP

## 2018-08-20 ENCOUNTER — TELEPHONE (OUTPATIENT)
Dept: SURGERY | Age: 43
End: 2018-08-20

## 2018-08-20 NOTE — TELEPHONE ENCOUNTER
Diet:Question of any nausea and/or vomiting. Protein intake (goal is 60 grams of protein daily)   Poor____Fair____Good____Great__x__    Comment:______________________________________________________________      ______________________________________________________________________    Hydration:Less than 32 ounces of water daily is fair to poor (Goal is 64 ounces per day)  Poor____ Fair____ Good__x__Great____    Comment:______________________________________________________________    ______________________________________________________________________      Ambulation:( walking at least 3 x week, for 15- 20 minutes)     Poor______ Fair______ Good______     Great___x___ Comment:__________________________________________________    ______________________________________________________________________      Urine Color: Question of any odor and color(should be arvin, pale, and clear) Dark______ Amber______ Pale__x____      Clear______ Comment:___________________________________________________                           ________________________________________________________________    Bowel movements: Question of any constipation- haven't had any bowel movements for more than 3 days. This could be related to protein intake and/or narcotic pain medication usage. Comment:      2x                                                                                                                        Pain: Left sided abdominal pain is normal (should be less than 3)  Question if pain medication is helpful.  10___ 9___ 8___ 7___ 6___ 5__x_ 4___ 3___     2___1___0___Comment:______where drain tube was___________________________________________    ______________________________________________________________________      Incision: (No redness, pain, swelling or fever) Healing Well______     Healed______Redness_________ Pain_________     Swelling____x_____ Fever__________(greater than 101 needs evaluation)    Comment:_______where drain tube was_____________________________________________________    ______________________________________________________________________  Use of incentive spirometer: Yes__x__       No           Next Appointment:___8/30/19___________                 Support Group: Yes__x____No______    Additional Comments:_____facebook & at hospital______________________________________________________    ____________________________________________________________________      If more than one parameter is not met or considered poor, nurse needs to discuss with provider recommend for patient to be seen in the office as soon as possible or refer to the provider for follow-up. Reinforce to patient to use bariatric educational booklet as guide. It is appropriate to refer patient to the nutritionist to discuss more in detail of diet and nutrition.

## 2018-08-20 NOTE — TELEPHONE ENCOUNTER
Pt stated her BS was 185 and it has not gotten that high since her surgery. I informed that if it continues to go that high she needs to contact her PCP. Pt verbalized understanding.

## 2018-08-22 ENCOUNTER — TELEPHONE (OUTPATIENT)
Dept: SURGERY | Age: 43
End: 2018-08-22

## 2018-08-22 NOTE — TELEPHONE ENCOUNTER
Pt states she had a BS on 235 today and contacted her endocrinologist. She put her on sliding scale insulin. Pt states her endocrinologist also said if she had a underlying infection it would effect her BS since she is eating correctly. I informed that pt should monitor BS while on insulin. If this does not bring it down she can address this at her ov next wk on 8/30/18. Pt verbalized understanding.

## 2018-08-27 NOTE — DISCHARGE SUMMARY
DISCHARGE SUMMARY      Patient ID:  Denise Panda  [id number]  20 y.o.  1975    Admit date: 8/15/2018    Discharge date and time: 8/17/2018 11:18 AM     Admitting Physician: Verona Romano MD     Discharge Physician: Verona Romano MD      Admission Diagnoses: MORBID OBESITY;Obesity    Discharge Diagnoses: Active Problems:    Obesity (8/15/2018)        Procedures: Procedure(s):  LAPAROSCOPIC GASTRIC BYPASS WITH EGD  ESOPHAGOGASTRODUODENOSCOPY (EGD)    Consults: none        Hospital Course:   Patient underwent a uneventful gastric bypass. She followed recovery the protocol well. On discharge, her pain was controlled,  She was ambulating, and tolerating 4 ounces of fluid an hour without difficulty. D/C Disposition: home     Patient Instructions:   Cannot display discharge medications since this patient is not currently admitted.       Diet: bariatric liquid     Follow-up with Verona Romano MD in 2 weeks       Signed:  Verona Romano MD  8/27/2018  2:08 PM

## 2018-08-28 ENCOUNTER — OFFICE VISIT (OUTPATIENT)
Dept: SURGERY | Age: 43
End: 2018-08-28

## 2018-08-28 VITALS
HEART RATE: 75 BPM | TEMPERATURE: 98.2 F | DIASTOLIC BLOOD PRESSURE: 60 MMHG | OXYGEN SATURATION: 98 % | HEIGHT: 60 IN | RESPIRATION RATE: 14 BRPM | WEIGHT: 173 LBS | BODY MASS INDEX: 33.96 KG/M2 | SYSTOLIC BLOOD PRESSURE: 102 MMHG

## 2018-08-28 DIAGNOSIS — Z09 POSTOPERATIVE EXAMINATION: Primary | ICD-10-CM

## 2018-08-28 NOTE — PROGRESS NOTES
1. Have you been to the ER, urgent care clinic since your last visit? Hospitalized since your last visit? No    2. Have you seen or consulted any other health care providers outside of the [address] [place] [address] since your last visit? Include any pap smears or colon screening.  No

## 2018-08-28 NOTE — PROGRESS NOTES
Subjective:      Denise Panda is a 37 y.o. female presents for postop care 13 days following GBP. Eating a liquid diet without difficulty. Denies nausea, dysphagia and reflux   Bowel movements are regular. The patient is voiding without difficulty. The patient is not having any pain. Taking MVI, B12, D3 and calcium   Walking for 20 minutes a day. Down 12 pounds  Only PRN SS insulin only     Objective:     Visit Vitals    /60 (BP 1 Location: Left arm, BP Patient Position: Sitting)    Pulse 75    Temp 98.2 °F (36.8 °C) (Oral)    Resp 14    Ht 5' (1.524 m)    Wt 173 lb (78.5 kg)    LMP 08/01/2018    SpO2 98%    BMI 33.79 kg/m2       General:  alert, cooperative, no distress, appears stated age   Abdomen: soft, bowel sounds active, non-tender   Incision:   healing well, no drainage, no erythema, no hernia, no seroma, no swelling, no dehiscence, incision well approximated     Assessment:     Doing well postoperatively. Plan:     1. Continue any current medications. 2. Wound care discussed. 3. Pt is to increase activities as tolerated.   4. Soft mushy phase 1.  5. follow up in 2 weeks

## 2018-09-06 ENCOUNTER — TELEPHONE (OUTPATIENT)
Dept: SURGERY | Age: 43
End: 2018-09-06

## 2018-09-06 NOTE — TELEPHONE ENCOUNTER
Diet:Question of any nausea and/or vomiting. Protein intake (goal is 60 grams of protein daily)   Poor____Fair____Good__x__Great____    Comment:______________________________________________________________      ______________________________________________________________________    Hydration:Less than 32 ounces of water daily is fair to poor (Goal is 64 ounces per day)  Poor____ Fair____ Good__x__Great____    Comment:______________________________________________________________    ______________________________________________________________________      Ambulation:( walking at least 3 x week, for 15- 20 minutes)     Poor______ Fair______ Good___x___     Great______ Comment:__________________________________________________    ______________________________________________________________________      Urine Color: Question of any odor and color(should be arvin, pale, and clear) Dark______ Amber______ Pale______      Clear______ Comment:________walking, stairs, eliptical___________________________________________                           ________________________________________________________________    Bowel movements: Question of any constipation- haven't had any bowel movements for more than 3 days. This could be related to protein intake and/or narcotic pain medication usage. Comment:           good                                                                                                                   Pain: Left sided abdominal pain is normal (should be less than 3)  Question if pain medication is helpful.  10___ 9___ 8___ 7___ 6___ 5___ 4___ 3___     2_x__1___0___Comment:_________________________________________________    ______________________________________________________________________      Incision: (No redness, pain, swelling or fever) Healing Well__x____     Healed______Redness_________ Pain_________     Swelling_________ Fever__________(greater than 101 needs evaluation)    Comment:____________________________________________________________    ______________________________________________________________________  Use of incentive spirometer: Yes__x__       No           Next Appointment:___9/13/18___________                 Support Group: Yes__x____No______    Additional Comments:____________________________________________________________    ____________________________________________________________________      If more than one parameter is not met or considered poor, nurse needs to discuss with provider recommend for patient to be seen in the office as soon as possible or refer to the provider for follow-up. Reinforce to patient to use bariatric educational booklet as guide. It is appropriate to refer patient to the nutritionist to discuss more in detail of diet and nutrition.

## 2018-09-13 ENCOUNTER — OFFICE VISIT (OUTPATIENT)
Dept: SURGERY | Age: 43
End: 2018-09-13

## 2018-09-13 VITALS
HEART RATE: 63 BPM | TEMPERATURE: 98.2 F | RESPIRATION RATE: 16 BRPM | SYSTOLIC BLOOD PRESSURE: 105 MMHG | WEIGHT: 166.31 LBS | BODY MASS INDEX: 32.65 KG/M2 | OXYGEN SATURATION: 99 % | HEIGHT: 60 IN | DIASTOLIC BLOOD PRESSURE: 65 MMHG

## 2018-09-13 DIAGNOSIS — K21.9 GASTROESOPHAGEAL REFLUX DISEASE WITHOUT ESOPHAGITIS: Primary | ICD-10-CM

## 2018-09-13 DIAGNOSIS — Z09 POSTOPERATIVE EXAMINATION: ICD-10-CM

## 2018-09-13 RX ORDER — PHENOL/SODIUM PHENOLATE
20 AEROSOL, SPRAY (ML) MUCOUS MEMBRANE DAILY
Qty: 30 TAB | Refills: 0 | Status: SHIPPED | OUTPATIENT
Start: 2018-09-13 | End: 2018-09-27 | Stop reason: SDUPTHER

## 2018-09-13 NOTE — PROGRESS NOTES
1. Have you been to the ER, urgent care clinic since your last visit? Hospitalized since your last visit? No    2. Have you seen or consulted any other health care providers outside of the [address] [place] [address] since your last visit? Include any pap smears or colon screening.  No

## 2018-09-14 ENCOUNTER — TELEPHONE (OUTPATIENT)
Dept: SURGERY | Age: 43
End: 2018-09-14

## 2018-09-14 NOTE — TELEPHONE ENCOUNTER
Pt stated that [facility] informed her she has omeprazole Rx but she is currently not having problems with Dolphus Finely and did not pick it up. I informed pt she did not need to take med if she is not having problem. Pt verbalized understanding.

## 2018-09-20 NOTE — PROGRESS NOTES
Subjective:      Sharifa Mcclure is a 37 y.o. female presents for postop care 4 weeks following GBP. Eating a soft diet without difficulty. Blood sugars are normal and is not taking any medication  Bowel movements are regular. The patient is voiding without difficulty. The patient is not having any pain. She it taking all vitamins  She is exercising     Objective:     Visit Vitals    /65 (BP 1 Location: Left arm, BP Patient Position: Sitting)    Pulse 63    Temp 98.2 °F (36.8 °C) (Oral)    Resp 16    Ht 5' (1.524 m)    Wt 166 lb 5 oz (75.4 kg)    SpO2 99%    BMI 32.48 kg/m2       General:  alert, cooperative, no distress, appears stated age   Abdomen: soft, bowel sounds active, non-tender   Incision:   healing well, no drainage, no erythema, no hernia, no seroma, no swelling, no dehiscence, incision well approximated     Assessment:     Doing well postoperatively. Plan:     1. Continue any current medications. 2. Wound care discussed. 3. Pt is to increase activities as tolerated. 4.  advance to soft mushy phase II.

## 2018-09-21 ENCOUNTER — TELEPHONE (OUTPATIENT)
Dept: SURGERY | Age: 43
End: 2018-09-21

## 2018-09-21 NOTE — TELEPHONE ENCOUNTER
Pt states she thinks she has a UTI. I informed she needs to see there PCP. She wanted to know if there is any medication she cannot take for the UTI. I informed that she can take abx. Pt verbalized understanding.

## 2018-09-27 ENCOUNTER — OFFICE VISIT (OUTPATIENT)
Dept: SURGERY | Age: 43
End: 2018-09-27

## 2018-09-27 VITALS
OXYGEN SATURATION: 98 % | BODY MASS INDEX: 31.02 KG/M2 | WEIGHT: 158 LBS | TEMPERATURE: 98.4 F | SYSTOLIC BLOOD PRESSURE: 113 MMHG | RESPIRATION RATE: 16 BRPM | HEIGHT: 60 IN | HEART RATE: 71 BPM | DIASTOLIC BLOOD PRESSURE: 71 MMHG

## 2018-09-27 DIAGNOSIS — Z09 POSTOPERATIVE EXAMINATION: ICD-10-CM

## 2018-09-27 DIAGNOSIS — B37.2 CANDIDAL SKIN INFECTION: Primary | ICD-10-CM

## 2018-09-27 RX ORDER — NYSTATIN 100000 [USP'U]/G
POWDER TOPICAL 4 TIMES DAILY
Qty: 30 G | Refills: 5 | Status: SHIPPED | OUTPATIENT
Start: 2018-09-27 | End: 2018-10-20

## 2018-09-27 NOTE — PROGRESS NOTES
1. Have you been to the ER, urgent care clinic since your last visit? Hospitalized since your last visit? No    2. Have you seen or consulted any other health care providers outside of the [address] [place] [address] since your last visit? Include any pap smears or colon screening.  No

## 2018-09-28 NOTE — PROGRESS NOTES
Subjective:      Ninoska Rodriguez is a 37 y.o. female presents for postop care 6 weeks following GBP. complains of dysphagia with chicken. Everything else is going well. Denies nausea and reflux  Bowel movements are regular. The patient is voiding without difficulty. The patient is not having any pain. .    Objective:     Visit Vitals    /71 (BP 1 Location: Left arm, BP Patient Position: Sitting)    Pulse 71    Temp 98.4 °F (36.9 °C) (Oral)    Resp 16    Ht 5' (1.524 m)    Wt 158 lb (71.7 kg)    LMP 09/01/2018 (Approximate)    SpO2 98%    BMI 30.86 kg/m2       General:  alert, cooperative, no distress, appears stated age   Abdomen: soft, bowel sounds active, non-tender   Incision:   healing well, no drainage, no erythema, no hernia, no seroma, no swelling, no dehiscence, incision well approximated     Assessment:     Doing well postoperatively. Plan:     1. Continue any current medications. 2. Wound care discussed. 3. No limits on activities  4. Can try all food textures  5. follow up in 2 weeks   .

## 2018-10-11 ENCOUNTER — OFFICE VISIT (OUTPATIENT)
Dept: SURGERY | Age: 43
End: 2018-10-11

## 2018-10-11 VITALS
HEIGHT: 60 IN | DIASTOLIC BLOOD PRESSURE: 64 MMHG | BODY MASS INDEX: 30.49 KG/M2 | SYSTOLIC BLOOD PRESSURE: 106 MMHG | HEART RATE: 77 BPM | RESPIRATION RATE: 14 BRPM | OXYGEN SATURATION: 98 % | TEMPERATURE: 98.4 F | WEIGHT: 155.31 LBS

## 2018-10-11 DIAGNOSIS — Z09 POSTOPERATIVE EXAMINATION: Primary | ICD-10-CM

## 2018-10-11 NOTE — PROGRESS NOTES
1. Have you been to the ER, urgent care clinic since your last visit? Hospitalized since your last visit? No    2. Have you seen or consulted any other health care providers outside of the [address] [place] [address] since your last visit? Include any pap smears or colon screening.  No

## 2018-10-12 NOTE — PROGRESS NOTES
Subjective:       Zeb Chua is a 37 y.o. female presents for postop care 8 weeks following GBP. complains of dysphagia with chicken. Everything else is going well. Denies nausea and reflux  Bowel movements are regular. The patient is voiding without difficulty. The patient is not having any pain.     Objective:        Blood pressure 106/64, pulse 77, temperature 98.4 °F (36.9 °C), temperature source Oral, resp. rate 14, height 5' (1.524 m), weight 155 lb 5 oz (70.4 kg), last menstrual period 09/12/2018, SpO2 98 %.       General:  alert, cooperative, no distress, appears stated age   Abdomen: soft, bowel sounds active, non-tender   Incision:   healing well, no drainage, no erythema, no hernia, no seroma, no swelling, no dehiscence, incision well approximated      Assessment:      Doing well postoperatively.     Plan:      1. Continue any current medications. 2. Wound care discussed. 3. No limits on activities  4. Can try all food textures  5. follow up in 6 week   .

## 2018-10-20 ENCOUNTER — HOSPITAL ENCOUNTER (INPATIENT)
Age: 43
LOS: 2 days | Discharge: HOME | DRG: 639 | End: 2018-10-22
Attending: EMERGENCY MEDICINE | Admitting: INTERNAL MEDICINE
Payer: COMMERCIAL

## 2018-10-20 ENCOUNTER — APPOINTMENT (OUTPATIENT)
Dept: GENERAL RADIOLOGY | Age: 43
DRG: 639 | End: 2018-10-20
Attending: EMERGENCY MEDICINE
Payer: COMMERCIAL

## 2018-10-20 ENCOUNTER — APPOINTMENT (OUTPATIENT)
Dept: CT IMAGING | Age: 43
DRG: 639 | End: 2018-10-20
Attending: INTERNAL MEDICINE
Payer: COMMERCIAL

## 2018-10-20 ENCOUNTER — APPOINTMENT (OUTPATIENT)
Dept: CT IMAGING | Age: 43
DRG: 639 | End: 2018-10-20
Attending: FAMILY MEDICINE
Payer: COMMERCIAL

## 2018-10-20 DIAGNOSIS — E87.29 HIGH ANION GAP METABOLIC ACIDOSIS: Primary | ICD-10-CM

## 2018-10-20 DIAGNOSIS — D72.829 LEUKOCYTOSIS, UNSPECIFIED TYPE: ICD-10-CM

## 2018-10-20 PROBLEM — R81 GLUCOSURIA: Status: ACTIVE | Noted: 2018-10-20

## 2018-10-20 PROBLEM — E87.20 METABOLIC ACIDOSIS: Status: ACTIVE | Noted: 2018-10-20

## 2018-10-20 PROBLEM — Z98.84 S/P GASTRIC BYPASS: Status: ACTIVE | Noted: 2018-10-20

## 2018-10-20 PROBLEM — R79.89: Status: ACTIVE | Noted: 2018-10-20

## 2018-10-20 LAB
ADMINISTERED INITIALS, ADMINIT: NORMAL
ALBUMIN SERPL-MCNC: 4.4 G/DL (ref 3.5–5)
ALBUMIN/GLOB SERPL: 0.8 {RATIO} (ref 1.1–2.2)
ALP SERPL-CCNC: 115 U/L (ref 45–117)
ALT SERPL-CCNC: 40 U/L (ref 12–78)
AMPHET UR QL SCN: NEGATIVE
ANION GAP SERPL CALC-SCNC: 18 MMOL/L (ref 5–15)
ANION GAP SERPL CALC-SCNC: 19 MMOL/L (ref 5–15)
ANION GAP SERPL CALC-SCNC: 22 MMOL/L (ref 5–15)
ANION GAP SERPL CALC-SCNC: 25 MMOL/L (ref 5–15)
APAP SERPL-MCNC: <2 UG/ML (ref 10–30)
APPEARANCE UR: ABNORMAL
AST SERPL-CCNC: 23 U/L (ref 15–37)
B-OH-BUTYR SERPL-SCNC: >4.42 MMOL/L
BACTERIA URNS QL MICRO: NEGATIVE /HPF
BARBITURATES UR QL SCN: NEGATIVE
BASE DEFICIT BLDV-SCNC: 20.9 MMOL/L
BASOPHILS # BLD: 0.1 K/UL (ref 0–0.1)
BASOPHILS NFR BLD: 0 % (ref 0–1)
BDY SITE: ABNORMAL
BENZODIAZ UR QL: NEGATIVE
BILIRUB SERPL-MCNC: 0.4 MG/DL (ref 0.2–1)
BILIRUB UR QL: NEGATIVE
BUN SERPL-MCNC: 10 MG/DL (ref 6–20)
BUN SERPL-MCNC: 6 MG/DL (ref 6–20)
BUN/CREAT SERPL: 12 (ref 12–20)
BUN/CREAT SERPL: 7 (ref 12–20)
CALCIUM SERPL-MCNC: 10 MG/DL (ref 8.5–10.1)
CALCIUM SERPL-MCNC: 8.6 MG/DL (ref 8.5–10.1)
CALCIUM SERPL-MCNC: 9.2 MG/DL (ref 8.5–10.1)
CALCIUM SERPL-MCNC: 9.4 MG/DL (ref 8.5–10.1)
CANNABINOIDS UR QL SCN: NEGATIVE
CHLORIDE SERPL-SCNC: 100 MMOL/L (ref 97–108)
CHLORIDE SERPL-SCNC: 101 MMOL/L (ref 97–108)
CHLORIDE SERPL-SCNC: 98 MMOL/L (ref 97–108)
CHLORIDE SERPL-SCNC: 99 MMOL/L (ref 97–108)
CHOLEST SERPL-MCNC: 148 MG/DL
CO2 SERPL-SCNC: 10 MMOL/L (ref 21–32)
CO2 SERPL-SCNC: 11 MMOL/L (ref 21–32)
CO2 SERPL-SCNC: 12 MMOL/L (ref 21–32)
CO2 SERPL-SCNC: 9 MMOL/L (ref 21–32)
COCAINE UR QL SCN: NEGATIVE
COLOR UR: ABNORMAL
COMMENT, HOLDF: NORMAL
CREAT SERPL-MCNC: 0.81 MG/DL (ref 0.55–1.02)
CREAT SERPL-MCNC: 0.82 MG/DL (ref 0.55–1.02)
CREAT SERPL-MCNC: 0.85 MG/DL (ref 0.55–1.02)
CREAT SERPL-MCNC: 0.91 MG/DL (ref 0.55–1.02)
CREAT UR-MCNC: 15.12 MG/DL
D DIMER PPP FEU-MCNC: 0.7 MG/L FEU (ref 0–0.65)
D50 ADMINISTERED, D50ADM: 0 ML
D50 ORDER, D50ORD: 0 ML
DIFFERENTIAL METHOD BLD: ABNORMAL
DRUG SCRN COMMENT,DRGCM: NORMAL
EOSINOPHIL # BLD: 0 K/UL (ref 0–0.4)
EOSINOPHIL NFR BLD: 0 % (ref 0–7)
EPITH CASTS URNS QL MICRO: ABNORMAL /LPF
ERYTHROCYTE [DISTWIDTH] IN BLOOD BY AUTOMATED COUNT: 16.2 % (ref 11.5–14.5)
EST. AVERAGE GLUCOSE BLD GHB EST-MCNC: 166 MG/DL
ETHANOL SERPL-MCNC: <10 MG/DL
GLOBULIN SER CALC-MCNC: 5.4 G/DL (ref 2–4)
GLSCOM COMMENTS: NORMAL
GLUCOSE BLD STRIP.AUTO-MCNC: 134 MG/DL (ref 65–100)
GLUCOSE BLD STRIP.AUTO-MCNC: 137 MG/DL (ref 65–100)
GLUCOSE BLD STRIP.AUTO-MCNC: 140 MG/DL (ref 65–100)
GLUCOSE BLD STRIP.AUTO-MCNC: 147 MG/DL (ref 65–100)
GLUCOSE BLD STRIP.AUTO-MCNC: 148 MG/DL (ref 65–100)
GLUCOSE BLD STRIP.AUTO-MCNC: 149 MG/DL (ref 65–100)
GLUCOSE BLD STRIP.AUTO-MCNC: 151 MG/DL (ref 65–100)
GLUCOSE BLD STRIP.AUTO-MCNC: 165 MG/DL (ref 65–100)
GLUCOSE BLD STRIP.AUTO-MCNC: 172 MG/DL (ref 65–100)
GLUCOSE SERPL-MCNC: 130 MG/DL (ref 65–100)
GLUCOSE SERPL-MCNC: 131 MG/DL (ref 65–100)
GLUCOSE SERPL-MCNC: 157 MG/DL (ref 65–100)
GLUCOSE SERPL-MCNC: 161 MG/DL (ref 65–100)
GLUCOSE UR STRIP.AUTO-MCNC: >1000 MG/DL
GLUCOSE, GLC: 134 MG/DL
GLUCOSE, GLC: 134 MG/DL
GLUCOSE, GLC: 137 MG/DL
GLUCOSE, GLC: 148 MG/DL
GLUCOSE, GLC: 148 MG/DL
GLUCOSE, GLC: 149 MG/DL
GLUCOSE, GLC: 165 MG/DL
GLUCOSE, GLC: 172 MG/DL
HBA1C MFR BLD: 7.4 % (ref 4.2–6.3)
HCG UR QL: NEGATIVE
HCO3 BLDV-SCNC: 7 MMOL/L (ref 23–28)
HCT VFR BLD AUTO: 48.2 % (ref 35–47)
HDLC SERPL-MCNC: 46 MG/DL
HDLC SERPL: 3.2 {RATIO} (ref 0–5)
HGB BLD-MCNC: 15.6 G/DL (ref 11.5–16)
HGB UR QL STRIP: ABNORMAL
HIGH TARGET, HITG: 180 MG/DL
HYALINE CASTS URNS QL MICRO: ABNORMAL /LPF (ref 0–5)
IMM GRANULOCYTES # BLD: 0.1 K/UL (ref 0–0.04)
IMM GRANULOCYTES NFR BLD AUTO: 1 % (ref 0–0.5)
INSULIN ADMINSTERED, INSADM: 0 UNITS/HOUR
INSULIN ADMINSTERED, INSADM: 0.8 UNITS/HOUR
INSULIN ADMINSTERED, INSADM: 1.1 UNITS/HOUR
INSULIN ADMINSTERED, INSADM: 1.1 UNITS/HOUR
INSULIN ADMINSTERED, INSADM: 1.8 UNITS/HOUR
INSULIN ORDER, INSORD: 0 UNITS/HOUR
INSULIN ORDER, INSORD: 0.8 UNITS/HOUR
INSULIN ORDER, INSORD: 1.1 UNITS/HOUR
INSULIN ORDER, INSORD: 1.1 UNITS/HOUR
INSULIN ORDER, INSORD: 1.8 UNITS/HOUR
KETONES SERPL QL: ABNORMAL
KETONES UR QL STRIP.AUTO: >80 MG/DL
LACTATE SERPL-SCNC: 1 MMOL/L (ref 0.4–2)
LDLC SERPL CALC-MCNC: 73.4 MG/DL (ref 0–100)
LEUKOCYTE ESTERASE UR QL STRIP.AUTO: NEGATIVE
LIPASE SERPL-CCNC: 113 U/L (ref 73–393)
LIPID PROFILE,FLP: NORMAL
LOW TARGET, LOT: 140 MG/DL
LYMPHOCYTES # BLD: 2 K/UL (ref 0.8–3.5)
LYMPHOCYTES NFR BLD: 10 % (ref 12–49)
MAGNESIUM SERPL-MCNC: 2 MG/DL (ref 1.6–2.4)
MAGNESIUM SERPL-MCNC: 2 MG/DL (ref 1.6–2.4)
MCH RBC QN AUTO: 25.7 PG (ref 26–34)
MCHC RBC AUTO-ENTMCNC: 32.4 G/DL (ref 30–36.5)
MCV RBC AUTO: 79.4 FL (ref 80–99)
METHADONE UR QL: NEGATIVE
MINUTES UNTIL NEXT BG, NBG: 60 MIN
MONOCYTES # BLD: 0.9 K/UL (ref 0–1)
MONOCYTES NFR BLD: 5 % (ref 5–13)
MULTIPLIER, MUL: 0
MULTIPLIER, MUL: 0.01
MULTIPLIER, MUL: 0.02
NEUTS SEG # BLD: 16.8 K/UL (ref 1.8–8)
NEUTS SEG NFR BLD: 84 % (ref 32–75)
NITRITE UR QL STRIP.AUTO: NEGATIVE
NRBC # BLD: 0 K/UL (ref 0–0.01)
NRBC BLD-RTO: 0 PER 100 WBC
OPIATES UR QL: NEGATIVE
ORDER INITIALS, ORDINIT: NORMAL
OSMOLALITY SERPL: 298 MOSM/KG H2O
OSMOLALITY UR: 681 MOSM/KG H2O
PCO2 BLDV: 24 MMHG (ref 41–51)
PCP UR QL: NEGATIVE
PH BLDV: 7.09 [PH] (ref 7.32–7.42)
PH UR STRIP: 5 [PH] (ref 5–8)
PLATELET # BLD AUTO: 396 K/UL (ref 150–400)
PMV BLD AUTO: 8.7 FL (ref 8.9–12.9)
PO2 BLDV: 40 MMHG (ref 25–40)
POTASSIUM SERPL-SCNC: 3.9 MMOL/L (ref 3.5–5.1)
POTASSIUM SERPL-SCNC: 4.5 MMOL/L (ref 3.5–5.1)
POTASSIUM SERPL-SCNC: 4.9 MMOL/L (ref 3.5–5.1)
POTASSIUM SERPL-SCNC: 5.3 MMOL/L (ref 3.5–5.1)
PROT SERPL-MCNC: 9.8 G/DL (ref 6.4–8.2)
PROT UR STRIP-MCNC: 100 MG/DL
RBC # BLD AUTO: 6.07 M/UL (ref 3.8–5.2)
RBC #/AREA URNS HPF: ABNORMAL /HPF (ref 0–5)
SALICYLATES SERPL-MCNC: 3.8 MG/DL (ref 2.8–20)
SAMPLES BEING HELD,HOLD: NORMAL
SAO2 % BLDV: 58 % (ref 65–88)
SAO2% DEVICE SAO2% SENSOR NAME: ABNORMAL
SERVICE CMNT-IMP: ABNORMAL
SODIUM SERPL-SCNC: 129 MMOL/L (ref 136–145)
SODIUM SERPL-SCNC: 130 MMOL/L (ref 136–145)
SODIUM SERPL-SCNC: 132 MMOL/L (ref 136–145)
SODIUM SERPL-SCNC: 133 MMOL/L (ref 136–145)
SODIUM UR-SCNC: 128 MMOL/L
SP GR UR REFRACTOMETRY: 1.03 (ref 1–1.03)
SPECIMEN SITE: ABNORMAL
TRIGL SERPL-MCNC: 143 MG/DL (ref ?–150)
TROPONIN I BLD-MCNC: <0.04 NG/ML (ref 0–0.08)
UA: UC IF INDICATED,UAUC: ABNORMAL
UROBILINOGEN UR QL STRIP.AUTO: 0.2 EU/DL (ref 0.2–1)
VLDLC SERPL CALC-MCNC: 28.6 MG/DL
WBC # BLD AUTO: 19.9 K/UL (ref 3.6–11)
WBC URNS QL MICRO: ABNORMAL /HPF (ref 0–4)

## 2018-10-20 PROCEDURE — 82010 KETONE BODYS QUAN: CPT | Performed by: INTERNAL MEDICINE

## 2018-10-20 PROCEDURE — 87040 BLOOD CULTURE FOR BACTERIA: CPT | Performed by: FAMILY MEDICINE

## 2018-10-20 PROCEDURE — 83605 ASSAY OF LACTIC ACID: CPT | Performed by: EMERGENCY MEDICINE

## 2018-10-20 PROCEDURE — 83036 HEMOGLOBIN GLYCOSYLATED A1C: CPT | Performed by: INTERNAL MEDICINE

## 2018-10-20 PROCEDURE — 80320 DRUG SCREEN QUANTALCOHOLS: CPT | Performed by: INTERNAL MEDICINE

## 2018-10-20 PROCEDURE — 82962 GLUCOSE BLOOD TEST: CPT

## 2018-10-20 PROCEDURE — 74011000258 HC RX REV CODE- 258: Performed by: INTERNAL MEDICINE

## 2018-10-20 PROCEDURE — 82009 KETONE BODYS QUAL: CPT | Performed by: EMERGENCY MEDICINE

## 2018-10-20 PROCEDURE — 74011000258 HC RX REV CODE- 258: Performed by: EMERGENCY MEDICINE

## 2018-10-20 PROCEDURE — 36415 COLL VENOUS BLD VENIPUNCTURE: CPT | Performed by: INTERNAL MEDICINE

## 2018-10-20 PROCEDURE — 82570 ASSAY OF URINE CREATININE: CPT | Performed by: INTERNAL MEDICINE

## 2018-10-20 PROCEDURE — 74177 CT ABD & PELVIS W/CONTRAST: CPT

## 2018-10-20 PROCEDURE — 83735 ASSAY OF MAGNESIUM: CPT | Performed by: INTERNAL MEDICINE

## 2018-10-20 PROCEDURE — 84300 ASSAY OF URINE SODIUM: CPT | Performed by: INTERNAL MEDICINE

## 2018-10-20 PROCEDURE — 74011250636 HC RX REV CODE- 250/636: Performed by: FAMILY MEDICINE

## 2018-10-20 PROCEDURE — 81001 URINALYSIS AUTO W/SCOPE: CPT | Performed by: FAMILY MEDICINE

## 2018-10-20 PROCEDURE — 83930 ASSAY OF BLOOD OSMOLALITY: CPT | Performed by: INTERNAL MEDICINE

## 2018-10-20 PROCEDURE — 99285 EMERGENCY DEPT VISIT HI MDM: CPT

## 2018-10-20 PROCEDURE — 74011636637 HC RX REV CODE- 636/637: Performed by: INTERNAL MEDICINE

## 2018-10-20 PROCEDURE — 80307 DRUG TEST PRSMV CHEM ANLYZR: CPT | Performed by: INTERNAL MEDICINE

## 2018-10-20 PROCEDURE — 84484 ASSAY OF TROPONIN QUANT: CPT

## 2018-10-20 PROCEDURE — 74011636320 HC RX REV CODE- 636/320: Performed by: RADIOLOGY

## 2018-10-20 PROCEDURE — 96361 HYDRATE IV INFUSION ADD-ON: CPT

## 2018-10-20 PROCEDURE — 93005 ELECTROCARDIOGRAM TRACING: CPT

## 2018-10-20 PROCEDURE — 85379 FIBRIN DEGRADATION QUANT: CPT | Performed by: EMERGENCY MEDICINE

## 2018-10-20 PROCEDURE — 83935 ASSAY OF URINE OSMOLALITY: CPT | Performed by: INTERNAL MEDICINE

## 2018-10-20 PROCEDURE — 71275 CT ANGIOGRAPHY CHEST: CPT

## 2018-10-20 PROCEDURE — 74011250636 HC RX REV CODE- 250/636: Performed by: EMERGENCY MEDICINE

## 2018-10-20 PROCEDURE — 74011250636 HC RX REV CODE- 250/636: Performed by: INTERNAL MEDICINE

## 2018-10-20 PROCEDURE — 96366 THER/PROPH/DIAG IV INF ADDON: CPT

## 2018-10-20 PROCEDURE — 96365 THER/PROPH/DIAG IV INF INIT: CPT

## 2018-10-20 PROCEDURE — 84681 ASSAY OF C-PEPTIDE: CPT | Performed by: INTERNAL MEDICINE

## 2018-10-20 PROCEDURE — 71046 X-RAY EXAM CHEST 2 VIEWS: CPT

## 2018-10-20 PROCEDURE — 74011000250 HC RX REV CODE- 250: Performed by: INTERNAL MEDICINE

## 2018-10-20 PROCEDURE — 83690 ASSAY OF LIPASE: CPT | Performed by: FAMILY MEDICINE

## 2018-10-20 PROCEDURE — 80061 LIPID PANEL: CPT | Performed by: INTERNAL MEDICINE

## 2018-10-20 PROCEDURE — 80048 BASIC METABOLIC PNL TOTAL CA: CPT | Performed by: INTERNAL MEDICINE

## 2018-10-20 PROCEDURE — 85025 COMPLETE CBC W/AUTO DIFF WBC: CPT | Performed by: FAMILY MEDICINE

## 2018-10-20 PROCEDURE — 65660000000 HC RM CCU STEPDOWN

## 2018-10-20 PROCEDURE — 82803 BLOOD GASES ANY COMBINATION: CPT | Performed by: EMERGENCY MEDICINE

## 2018-10-20 PROCEDURE — 74011250637 HC RX REV CODE- 250/637: Performed by: INTERNAL MEDICINE

## 2018-10-20 PROCEDURE — 81025 URINE PREGNANCY TEST: CPT

## 2018-10-20 PROCEDURE — 80053 COMPREHEN METABOLIC PANEL: CPT | Performed by: FAMILY MEDICINE

## 2018-10-20 RX ORDER — SODIUM CHLORIDE 0.9 % (FLUSH) 0.9 %
5-10 SYRINGE (ML) INJECTION AS NEEDED
Status: DISCONTINUED | OUTPATIENT
Start: 2018-10-20 | End: 2018-10-22 | Stop reason: HOSPADM

## 2018-10-20 RX ORDER — INSULIN LISPRO 100 [IU]/ML
INJECTION, SOLUTION INTRAVENOUS; SUBCUTANEOUS
Status: DISCONTINUED | OUTPATIENT
Start: 2018-10-20 | End: 2018-10-21

## 2018-10-20 RX ORDER — HYDROCODONE BITARTRATE AND ACETAMINOPHEN 5; 325 MG/1; MG/1
1 TABLET ORAL
Status: DISCONTINUED | OUTPATIENT
Start: 2018-10-20 | End: 2018-10-22 | Stop reason: HOSPADM

## 2018-10-20 RX ORDER — TRAMADOL HYDROCHLORIDE 50 MG/1
50 TABLET ORAL
Status: DISCONTINUED | OUTPATIENT
Start: 2018-10-20 | End: 2018-10-22 | Stop reason: HOSPADM

## 2018-10-20 RX ORDER — ZOLPIDEM TARTRATE 5 MG/1
5 TABLET ORAL
Status: DISCONTINUED | OUTPATIENT
Start: 2018-10-20 | End: 2018-10-22 | Stop reason: HOSPADM

## 2018-10-20 RX ORDER — MAGNESIUM SULFATE 100 %
4 CRYSTALS MISCELLANEOUS AS NEEDED
Status: DISCONTINUED | OUTPATIENT
Start: 2018-10-20 | End: 2018-10-22 | Stop reason: HOSPADM

## 2018-10-20 RX ORDER — DEXTROSE 50 % IN WATER (D50W) INTRAVENOUS SYRINGE
25-50 AS NEEDED
Status: DISCONTINUED | OUTPATIENT
Start: 2018-10-20 | End: 2018-10-22 | Stop reason: HOSPADM

## 2018-10-20 RX ORDER — ONDANSETRON 2 MG/ML
4 INJECTION INTRAMUSCULAR; INTRAVENOUS
Status: DISCONTINUED | OUTPATIENT
Start: 2018-10-20 | End: 2018-10-22 | Stop reason: HOSPADM

## 2018-10-20 RX ORDER — ENOXAPARIN SODIUM 100 MG/ML
40 INJECTION SUBCUTANEOUS EVERY 24 HOURS
Status: DISCONTINUED | OUTPATIENT
Start: 2018-10-20 | End: 2018-10-22 | Stop reason: HOSPADM

## 2018-10-20 RX ORDER — SODIUM CHLORIDE 0.9 % (FLUSH) 0.9 %
5-10 SYRINGE (ML) INJECTION EVERY 8 HOURS
Status: DISCONTINUED | OUTPATIENT
Start: 2018-10-20 | End: 2018-10-22 | Stop reason: HOSPADM

## 2018-10-20 RX ORDER — BISACODYL 5 MG
10 TABLET, DELAYED RELEASE (ENTERIC COATED) ORAL DAILY PRN
Status: DISCONTINUED | OUTPATIENT
Start: 2018-10-20 | End: 2018-10-22 | Stop reason: HOSPADM

## 2018-10-20 RX ORDER — AMOXICILLIN 250 MG
1 CAPSULE ORAL 2 TIMES DAILY
Status: DISCONTINUED | OUTPATIENT
Start: 2018-10-20 | End: 2018-10-22 | Stop reason: HOSPADM

## 2018-10-20 RX ORDER — LEVOFLOXACIN 750 MG/1
750 TABLET ORAL EVERY 24 HOURS
Status: DISCONTINUED | OUTPATIENT
Start: 2018-10-20 | End: 2018-10-21

## 2018-10-20 RX ORDER — DEXTROSE MONOHYDRATE 50 MG/ML
25 INJECTION, SOLUTION INTRAVENOUS CONTINUOUS
Status: DISCONTINUED | OUTPATIENT
Start: 2018-10-20 | End: 2018-10-21

## 2018-10-20 RX ORDER — DEXTROSE 50 % IN WATER (D50W) INTRAVENOUS SYRINGE
25-50 AS NEEDED
Status: DISCONTINUED | OUTPATIENT
Start: 2018-10-20 | End: 2018-10-21

## 2018-10-20 RX ADMIN — IOPAMIDOL 100 ML: 755 INJECTION, SOLUTION INTRAVENOUS at 06:47

## 2018-10-20 RX ADMIN — DEXTROSE MONOHYDRATE 75 ML/HR: 5 INJECTION, SOLUTION INTRAVENOUS at 17:43

## 2018-10-20 RX ADMIN — HYDROCODONE BITARTRATE AND ACETAMINOPHEN 1 TABLET: 5; 325 TABLET ORAL at 16:03

## 2018-10-20 RX ADMIN — SODIUM CHLORIDE 1.8 UNITS/HR: 900 INJECTION, SOLUTION INTRAVENOUS at 17:42

## 2018-10-20 RX ADMIN — ENOXAPARIN SODIUM 40 MG: 40 INJECTION SUBCUTANEOUS at 10:40

## 2018-10-20 RX ADMIN — HYDROCODONE BITARTRATE AND ACETAMINOPHEN 1 TABLET: 5; 325 TABLET ORAL at 20:47

## 2018-10-20 RX ADMIN — PIPERACILLIN SODIUM,TAZOBACTAM SODIUM 4.5 G: 4; .5 INJECTION, POWDER, FOR SOLUTION INTRAVENOUS at 06:13

## 2018-10-20 RX ADMIN — WATER: 1000 INJECTION, SOLUTION INTRAVENOUS at 10:40

## 2018-10-20 RX ADMIN — LEVOFLOXACIN 750 MG: 750 TABLET, FILM COATED ORAL at 10:00

## 2018-10-20 RX ADMIN — SODIUM CHLORIDE 1000 ML: 900 INJECTION, SOLUTION INTRAVENOUS at 06:17

## 2018-10-20 RX ADMIN — BISACODYL 10 MG: 5 TABLET, COATED ORAL at 18:32

## 2018-10-20 RX ADMIN — WATER: 1000 INJECTION, SOLUTION INTRAVENOUS at 20:26

## 2018-10-20 RX ADMIN — ZOLPIDEM TARTRATE 5 MG: 5 TABLET ORAL at 20:47

## 2018-10-20 RX ADMIN — Medication 1 TABLET: at 18:32

## 2018-10-20 RX ADMIN — IOPAMIDOL 100 ML: 755 INJECTION, SOLUTION INTRAVENOUS at 10:06

## 2018-10-20 RX ADMIN — Medication 10 ML: at 10:40

## 2018-10-20 RX ADMIN — SODIUM CHLORIDE 1000 ML: 900 INJECTION, SOLUTION INTRAVENOUS at 05:04

## 2018-10-20 RX ADMIN — Medication 10 ML: at 13:47

## 2018-10-20 RX ADMIN — TRAMADOL HYDROCHLORIDE 50 MG: 50 TABLET, COATED ORAL at 10:41

## 2018-10-20 NOTE — CONSULTS
Terrance Elizabeth  YOB: 1975          Assessment & Plan:   1. Severe AGMA  2. Type II DM  3. Recent GBP 8/15/18  4. Recent discontinuation of insulin    DISCUSSION  - would check BHB. If high, would Rx for DKA. - unable to chk for D-lactate but this would not cause ketonuria. - doubt AKA but does cause ketoacidosis with normal glucoes. - need to exclude type I DM with c-peptide. RECOMMEND:  · chk BHB  · chk c-peptide. · If BHB is high, would Rx for DKA with dextrose and insulin. · If c-peptide is low, presume type I DM, suppressed pancreas or insulin consuming process. Subjective:   CC:  Severe AG MA  HPI: 37 y.o. WF with a hx of DM2 and obesity with recent laparoscopic gastric bypass 8/15/18 (Dr. Genie Hawk) who presented to the Emergency Department complaining of constipation. In the ED, w/u documented a severe increased AG MA. U/A showed 4+ ketones, 4+ glucose. BHB not done yet. Patient reports that she has been doing well in the post operative period with significant weight reduction and has come off nearly all medications for DM2 including her maintenance insulin which was stopped about 3 weeks ago. She has a presumed dx of type II DM and was managed through most of her course of DM without insulin. She does not know if she has had a c-peptide level done ever. Over the past week, she has felt more bloated and has not been able to have a BM and came to the ED. Her initial lab work demonstrated high anion gap metabolic acidosis of unclear etiology. She is on a HCO3 drip. She does not drink EtOH to excess. She is not in starvation mode. FH: neg for renal disease.  + CAD. SH:  and works. No cig use.   Review of Systems - History obtained from the patient  General ROS: positive for  - weight loss  Psychological ROS: negative  Hematological and Lymphatic ROS: negative  Endocrine ROS: negative for - polydipsia/polyuria or unexpected weight changes  Respiratory ROS: no cough, shortness of breath, or wheezing  Cardiovascular ROS: no chest pain or dyspnea on exertion  Gastrointestinal ROS: positive for - constipation  negative for - abdominal pain, appetite loss, diarrhea, gas/bloating, heartburn, hematemesis, melena or nausea/vomiting  Genito-Urinary ROS: no dysuria, trouble voiding, or hematuria  Musculoskeletal ROS: negative  negative for - joint pain, joint stiffness, joint swelling, muscle pain or muscular weakness  Neurological ROS: no TIA or stroke symptoms  Dermatological ROS: negative  negative for - nail changes, pruritus, rash or skin lesion changes    Current Facility-Administered Medications   Medication Dose Route Frequency    sodium bicarbonate (8.4%) 150 mEq in sterile water 1,000 mL infusion   IntraVENous CONTINUOUS    levoFLOXacin (LEVAQUIN) tablet 750 mg  750 mg Oral Q24H    sodium chloride (NS) flush 5-10 mL  5-10 mL IntraVENous Q8H    sodium chloride (NS) flush 5-10 mL  5-10 mL IntraVENous PRN    zolpidem (AMBIEN) tablet 5 mg  5 mg Oral QHS PRN    glucose chewable tablet 16 g  4 Tab Oral PRN    dextrose (D50W) injection syrg 12.5-25 g  25-50 mL IntraVENous PRN    glucagon (GLUCAGEN) injection 1 mg  1 mg IntraMUSCular PRN    ondansetron (ZOFRAN) injection 4 mg  4 mg IntraVENous Q4H PRN    enoxaparin (LOVENOX) injection 40 mg  40 mg SubCUTAneous Q24H    insulin lispro (HUMALOG) injection   SubCUTAneous AC&HS    traMADol (ULTRAM) tablet 50 mg  50 mg Oral Q6H PRN    HYDROcodone-acetaminophen (NORCO) 5-325 mg per tablet 1 Tab  1 Tab Oral Q4H PRN    bisacodyl (DULCOLAX) tablet 10 mg  10 mg Oral DAILY PRN    senna-docusate (PERICOLACE) 8.6-50 mg per tablet 1 Tab  1 Tab Oral BID    dextrose 5% infusion  75 mL/hr IntraVENous CONTINUOUS          Objective:     Vitals:  Blood pressure 137/79, pulse (!) 108, temperature 98.5 °F (36.9 °C), resp.  rate 18, height 5' (1.524 m), weight 64.9 kg (143 lb), SpO2 99 %. Temp (24hrs), Av °F (36.7 °C), Min:97.4 °F (36.3 °C), Max:98.5 °F (36.9 °C)      Intake and Output:  10/20 0701 - 10/20 1900  In: -   Out: 4955 [Urine:1150]  No intake/output data recorded. Physical Exam:                Patient is intubated:  no    Physical Examination:   GENERAL ASSESSMENT: NAD  HEENT:Nontraumatic   CHEST: CTA  HEART: S1S2  ABDOMEN: Soft,NT,  :Ramirez:   EXTREMITY: EDEMA  NEURO:Grossly non focal          ECG/rhythm:    Data Review      Recent Labs     10/20/18  0817   TNIPOC <0.04      No results for input(s): CPK, CKMB, TROIQ in the last 72 hours. Recent Labs     10/20/18  0817 10/20/18  0456 10/20/18  0442   *  --  133*   K 4.9  --  5.3*     --  98   CO2 9*  --  10*   BUN 6  --  10   CREA 0.82  --  0.81   *  --  157*   CA 8.6  --  10.0   ALB  --   --  4.4   WBC  --  19.9*  --    HGB  --  15.6  --    HCT  --  48.2*  --    PLT  --  396  --       No results for input(s): INR, PTP, APTT in the last 72 hours. No lab exists for component: INREXT  Needs: urine analysis, urine sodium, protein and creatinine  Lab Results   Component Value Date/Time    Sodium,urine random 128 10/20/2018 10:37 AM    Creatinine, urine 15.12 10/20/2018 10:37 AM         Discussed with:  pt and /sister    : Sesar Ornelas MD  10/20/2018        [facility]:  [email or web address] "[email or web address]  [place] office:  [address] [place] [address], [address] [facility] [address]  [place], [address] [facility]  Phone: [phone]  Fax :     [phone]    [place] office:  [address] [facility], [address]  Phone - [phone]  Fax - [phone]

## 2018-10-20 NOTE — PROGRESS NOTES
SHIFT REPORT: 
1900-Bedside shift change report given to Nura Chen RN (oncoming nurse) by Emy Armenta RN (offgoing nurse). Report included the following information SBAR, Kardex, Procedure Summary, Intake/Output, Recent Results and Cardiac Rhythm. SHIFT SUMMARY: 
2050-Norco and Ambien given for HA and desire to sleep; 
2100-Dr. Lili Sunshine notified of recent Met blood results; will notifiy of rest of night labs in AM with rounds. No changes 0001-venous blood drawn for Met panel; Insuliln has been off for 2 hrs; pt up to BR to void 0050-Insulin restarted for glucose stick of 226 
0245-up to BR to void 850ml; no BM; Insulin per glucose-stableizer 0515-blood redrawn as 0400 sample partially hemolized 0630-up to BR to void END OF SHIFT REPORT: 
0700-Bedside shift change report given to Emy Armenta RN (oncoming nurse) by Nura Chen RN (offgoing nurse). Report included the following information SBAR, Kardex, Procedure Summary, Intake/Output, Recent Results and Cardiac Rhythm .

## 2018-10-20 NOTE — CONSULTS
\Consult    Subjective:     Destiny Charles is a 37 y. o.yr old [place] female with type 2 diabetes(dx in 2007), hyperlipidemia with recent RYGB surgery on 8/15/18 admitted for anion gap metabolic acidosis. Patient's endocrinologist is colleague, Dr. Gennett Castleman    For past week, patient has been having constipation then on Thursday night developed constipation and last night was feeling fatigued with increased breathing rate, which brought her to the ER. She denies any nausea, vomiting, or abdominal pain. She states that she has had mid back pain since her surgery. She was noted to have ph of 7.09, bicarb of 10 and anion gap of 25 with blood sugar of 157. Of note prior to her gastric bypass surgery, patient was on Lantus 40 units, Jardiance 25 mg daily, glipzide 10 gm bid, metformin 1000 mg bid and apidra of meals (3x per day). Patient was discharged with no medications after her surgery but continued to check blood sugars at home and states that they were over 230 so resumed low doses of Lantus and apidra, which she slowly tapered off of due to improvement in blood sugars. She was seen by Dr. Chasity Garcia on 10/12/18 and by this time was off of insulin but morning blood sugars were slightly elevatedand so started on Jardiance 10 mg daily, which per patient she started on Monday (10/15/18). Patient states since her surgery she has been eating small frequent meals. Her total intake was 1/2 protein shake during the day plus green beans, and then small bites of other food. She has been eating very small amounts of carbohydrates. She has been keeping a  Food and blood sugar log at home. Past Medical History:   Diagnosis Date    Chronic pain     ULISSES. HIPS    Diabetes ([address]     IDDM    History of esophageal dilatation 2008    Tuberculosis 2015    H/O POS. PPD - [place] NINE MOS. ANTIBX THERAPY      Past Surgical History:   Procedure Laterality Date    HX DILATION AND CURETTAGE  2003    HX ORTHOPAEDIC  2003    ULISSES. CARPAL TUNNEL    HX UROLOGICAL  01/2017    BLADDER SURGERY - MESH     Family History   Problem Relation Age of Onset    Cancer Mother         breast    Diabetes Father     Heart Disease Maternal Grandmother     Heart Disease Paternal Grandfather     Anesth Problems Neg Hx       Social History     Tobacco Use    Smoking status: Never Smoker    Smokeless tobacco: Never Used   Substance Use Topics    Alcohol use: Yes     Comment: 2 DRINKS PER MONTH       Current Facility-Administered Medications   Medication Dose Route Frequency Provider Last Rate Last Dose    sodium bicarbonate (8.4%) 150 mEq in sterile water 1,000 mL infusion   IntraVENous CONTINUOUS Leanna Pila, Pauly Bret V, DO 84 mL/hr at 10/20/18 1040      levoFLOXacin (LEVAQUIN) tablet 750 mg  750 mg Oral Q24H Peggy Jose V, DO   750 mg at 10/20/18 1000    sodium chloride (NS) flush 5-10 mL  5-10 mL IntraVENous Q8H Leanna Pila, Pauly Bret V, DO   10 mL at 10/20/18 1347    sodium chloride (NS) flush 5-10 mL  5-10 mL IntraVENous PRN Leanna Pila, Pauly Bret V, DO        zolpidem (AMBIEN) tablet 5 mg  5 mg Oral QHS PRN Leanna Pila, Pauly Bret V, DO        glucose chewable tablet 16 g  4 Tab Oral PRN Leanna Pila, Isidor Ledger, DO        dextrose (D50W) injection syrg 12.5-25 g  25-50 mL IntraVENous PRN Leanna Pila, Isidor Ledger, DO        glucagon (GLUCAGEN) injection 1 mg  1 mg IntraMUSCular PRN Leanna Pila, Pauly Bret V, DO        [facility]dansetron [facility]) injection 4 mg  4 mg IntraVENous Q4H PRN Leanna Pila, Pauly Bret V, DO        enoxaparin (LOVENOX) injection 40 mg  40 mg SubCUTAneous Q24H Leanna Pila, Pauly Bret V, DO   40 mg at 10/20/18 1040    insulin lispro (HUMALOG) injection   SubCUTAneous AC&HS Leanna Pila, Pauly Bret V, DO   Stopped at 10/20/18 1130    traMADol (ULTRAM) tablet 50 mg  50 mg Oral Q6H PRN Leanna Pila, Pauly Chavarriael V, DO   50 mg at 10/20/18 1041    HYDROcodone-acetaminophen (NORCO) 5-325 mg per tablet 1 Tab  1 Tab Oral Q4H PRN Peggy Garcia V, DO   1 Tab at 10/20/18 1603    bisacodyl (DULCOLAX) tablet 10 mg  10 mg Oral DAILY PRN Leanna Pila, Isidor Ledger, DO        senna-docusate (PERICOLACE) 8.6-50 mg per tablet 1 Tab  1 Tab Oral BID Peggy Jose V, DO        dextrose 5% infusion  75 mL/hr IntraVENous CONTINUOUS Leanna Pila, Isidor Ledger, DO        insulin regular (NOVOLIN R, HUMULIN R) 100 Units in 0.9% sodium chloride 100 mL infusion  1-10 Units/hr IntraVENous TITRATE Leanna Pila, Pauly Bret V, DO        dextrose (D50W) injection syrg 12.5-25 g  25-50 mL IntraVENous PRN Peggy Jose V, DO            No Known Allergies     Review of Systems:  Gen; nad  Heent; dry mouth  pulm +sob  Heart; no chest pain  Gi; no nausea, no vomiting; no abdominal pain; no diarrhea +constipation. Gu; no dysuria; no polyuria    Objective:     Visit Vitals  /77 (BP 1 Location: Left arm, BP Patient Position: At rest)   Pulse 97   Temp 97.5 °F (36.4 °C)   Resp 18   Ht 5' (1.524 m)   Wt 64.9 kg (143 lb)   SpO2 99%   BMI 27.93 kg/m²     Intake and Output:    10/20 0701 - 10/20 1900  In: -   Out: 1889 [Urine:1150]  No intake/output data recorded. Physical Exam:   Gen; pleasant female no acute distress  Heent; dry mucous membranes  Heart; tachycardic; regular rhythm  Abd: soft/nt/nd +bs  Ext; no edema  Back; no CVA tenderness; no spinal tenderness      Data Review:   Recent Results (from the past 24 hour(s))   METABOLIC PANEL, COMPREHENSIVE    Collection Time: 10/20/18  4:42 AM   Result Value Ref Range    Sodium 133 (L) 136 - 145 mmol/L    Potassium 5.3 (H) 3.5 - 5.1 mmol/L    Chloride 98 97 - 108 mmol/L    CO2 10 (LL) 21 - 32 mmol/L    Anion gap 25 (H) 5 - 15 mmol/L    Glucose 157 (H) 65 - 100 mg/dL    BUN 10 6 - 20 MG/DL    Creatinine 0.81 0.55 - 1.02 MG/DL    BUN/Creatinine ratio 12 12 - 20      GFR est AA >60 >60 ml/min/1.73m2    GFR est non-AA >60 >60 ml/min/1.73m2    Calcium 10.0 8.5 - 10.1 MG/DL    Bilirubin, total 0.4 0.2 - 1.0 MG/DL    ALT (SGPT) 40 12 - 78 U/L    AST (SGOT) 23 15 - 37 U/L    Alk.  phosphatase 115 45 - 117 U/L    Protein, total 9.8 (H) 6.4 - 8.2 g/dL    Albumin 4.4 3.5 - 5.0 g/dL    Globulin 5.4 (H) 2.0 - 4.0 g/dL    A-G Ratio 0.8 (L) 1.1 - 2.2     CBC WITH AUTOMATED DIFF    Collection Time: 10/20/18  4:56 AM   Result Value Ref Range    WBC 19.9 (H) 3.6 - 11.0 K/uL    RBC 6.07 (H) 3.80 - 5.20 M/uL    HGB 15.6 11.5 - 16.0 g/dL    HCT 48.2 (H) 35.0 - 47.0 %    MCV 79.4 (L) 80.0 - 99.0 FL    MCH 25.7 (L) 26.0 - 34.0 PG    MCHC 32.4 30.0 - 36.5 g/dL    RDW 16.2 (H) 11.5 - 14.5 %    PLATELET 108 601 - 690 K/uL    MPV 8.7 (L) 8.9 - 12.9 FL    NRBC 0.0 0  WBC    ABSOLUTE NRBC 0.00 0.00 - 0.01 K/uL    NEUTROPHILS 84 (H) 32 - 75 %    LYMPHOCYTES 10 (L) 12 - 49 %    MONOCYTES 5 5 - 13 %    EOSINOPHILS 0 0 - 7 %    BASOPHILS 0 0 - 1 %    IMMATURE GRANULOCYTES 1 (H) 0.0 - 0.5 %    ABS. NEUTROPHILS 16.8 (H) 1.8 - 8.0 K/UL    ABS. LYMPHOCYTES 2.0 0.8 - 3.5 K/UL    ABS. MONOCYTES 0.9 0.0 - 1.0 K/UL    ABS. EOSINOPHILS 0.0 0.0 - 0.4 K/UL    ABS. BASOPHILS 0.1 0.0 - 0.1 K/UL    ABS. IMM.  GRANS. 0.1 (H) 0.00 - 0.04 K/UL    DF AUTOMATED     LIPASE    Collection Time: 10/20/18  4:56 AM   Result Value Ref Range    Lipase 113 73 - 393 U/L   URINALYSIS W/ REFLEX CULTURE    Collection Time: 10/20/18  4:56 AM   Result Value Ref Range    Color YELLOW/STRAW      Appearance CLOUDY (A) CLEAR      Specific gravity 1.026 1.003 - 1.030      pH (UA) 5.0 5.0 - 8.0      Protein 100 (A) NEG mg/dL    Glucose >1,000 (A) NEG mg/dL    Ketone >80 (A) NEG mg/dL    Bilirubin NEGATIVE  NEG      Blood SMALL (A) NEG      Urobilinogen 0.2 0.2 - 1.0 EU/dL    Nitrites NEGATIVE  NEG      Leukocyte Esterase NEGATIVE  NEG      WBC 0-4 0 - 4 /hpf    RBC 0-5 0 - 5 /hpf    Epithelial cells FEW FEW /lpf    Bacteria NEGATIVE  NEG /hpf    UA:UC IF INDICATED CULTURE NOT INDICATED BY UA RESULT CNI      Hyaline cast 0-2 0 - 5 /lpf   HEMOGLOBIN A1C WITH EAG    Collection Time: 10/20/18  4:56 AM   Result Value Ref Range    Hemoglobin A1c 7.4 (H) 4.2 - 6.3 % Est. average glucose 166 mg/dL   LACTIC ACID    Collection Time: 10/20/18  5:23 AM   Result Value Ref Range    Lactic acid 1.0 0.4 - 2.0 MMOL/L   VENOUS BLOOD GAS    Collection Time: 10/20/18  5:30 AM   Result Value Ref Range    VENOUS PH 7.09 (LL) 7.32 - 7.42      VENOUS PCO2 24 (L) 41 - 51 mmHg    VENOUS PO2 40 25 - 40 mmHg    VENOUS O2 SATURATION 58 (L) 65 - 88 %    VENOUS BICARBONATE 7 (L) 23 - 28 mmol/L    VENOUS BASE DEFICIT 20.9 mmol/L    O2 METHOD ROOM AIR      Sample source VENOUS      SITE OTHER      Critical value read back Donnell Canavan, MD    CULTURE, BLOOD, PAIRED    Collection Time: 10/20/18  6:17 AM   Result Value Ref Range    Special Requests: NO SPECIAL REQUESTS      Culture result: NO GROWTH AFTER 2 HOURS     HCG URINE, QL. - POC    Collection Time: 10/20/18  6:33 AM   Result Value Ref Range    Pregnancy test,urine (POC) NEGATIVE  NEG     EKG, 12 LEAD, INITIAL    Collection Time: 10/20/18  7:35 AM   Result Value Ref Range    Ventricular Rate 111 BPM    Atrial Rate 111 BPM    P-R Interval 148 ms    QRS Duration 78 ms    Q-T Interval 342 ms    QTC Calculation (Bezet) 465 ms    Calculated P Axis 55 degrees    Calculated R Axis 34 degrees    Calculated T Axis 46 degrees    Diagnosis       Sinus tachycardia  T wave abnormality, consider anterolateral ischemia  Abnormal ECG  When compared with ECG of 19-JUL-2018 08:28,  Vent.  rate has increased BY  43 BPM  Nonspecific T wave abnormality, worse in Inferior leads  T wave inversion now evident in Anterolateral leads     METABOLIC PANEL, BASIC    Collection Time: 10/20/18  8:17 AM   Result Value Ref Range    Sodium 132 (L) 136 - 145 mmol/L    Potassium 4.9 3.5 - 5.1 mmol/L    Chloride 101 97 - 108 mmol/L    CO2 9 (LL) 21 - 32 mmol/L    Anion gap 22 (H) 5 - 15 mmol/L    Glucose 131 (H) 65 - 100 mg/dL    BUN 6 6 - 20 MG/DL    Creatinine 0.82 0.55 - 1.02 MG/DL    BUN/Creatinine ratio 7 (L) 12 - 20      GFR est AA >60 >60 ml/min/1.73m2    GFR est non-AA >60 >60 ml/min/1.73m2    Calcium 8.6 8.5 - 10.1 MG/DL   ACETONE/KETONE, QL    Collection Time: 10/20/18  8:17 AM   Result Value Ref Range    Acetone/Ketone serum, QL. MODERATE (A) NEG        D DIMER    Collection Time: 10/20/18  8:17 AM   Result Value Ref Range    D-dimer 0.70 (H) 0.00 - 0.65 mg/L FEU   POC TROPONIN-I    Collection Time: 10/20/18  8:17 AM   Result Value Ref Range    Troponin-I (POC) <0.04 0.00 - 0.08 ng/mL   DRUG SCREEN, URINE    Collection Time: 10/20/18 10:37 AM   Result Value Ref Range    AMPHETAMINES NEGATIVE  NEG      BARBITURATES NEGATIVE  NEG      BENZODIAZEPINES NEGATIVE  NEG      COCAINE NEGATIVE  NEG      METHADONE NEGATIVE  NEG      OPIATES NEGATIVE  NEG      PCP(PHENCYCLIDINE) NEGATIVE  NEG      THC (TH-CANNABINOL) NEGATIVE  NEG      Drug screen comment (NOTE)    SODIUM, UR, RANDOM    Collection Time: 10/20/18 10:37 AM   Result Value Ref Range    Sodium,urine random 128 MMOL/L   CREATININE, UR, RANDOM    Collection Time: 10/20/18 10:37 AM   Result Value Ref Range    Creatinine, urine 15.12 mg/dL   OSMOLALITY, UR    Collection Time: 10/20/18 10:37 AM   Result Value Ref Range    Osmolality,urine 681 MOSM/kg H2O   SAMPLES BEING HELD    Collection Time: 10/20/18 10:37 AM   Result Value Ref Range    SAMPLES BEING HELD 1CUP OF URINE, 1 GREY TOP URINE     COMMENT        Add-on orders for these samples will be processed based on acceptable specimen integrity and analyte stability, which may vary by analyte.    GLUCOSE, POC    Collection Time: 10/20/18 10:51 AM   Result Value Ref Range    Glucose (POC) 151 (H) 65 - 100 mg/dL    Performed by Óscar Swanson (PCT)    SALICYLATE    Collection Time: 10/20/18 11:18 AM   Result Value Ref Range    Salicylate level 3.8 2.8 - 20.0 MG/DL   ACETAMINOPHEN    Collection Time: 10/20/18 11:18 AM   Result Value Ref Range    Acetaminophen level <2 (L) 10 - 30 ug/mL   OSMOLALITY, SERUM/PLASMA    Collection Time: 10/20/18 11:18 AM   Result Value Ref Range    Osmolality, serum/plasma 298 mOsm/kg H2O   LIPID PANEL    Collection Time: 10/20/18 11:18 AM   Result Value Ref Range    LIPID PROFILE          Cholesterol, total 148 <200 MG/DL    Triglyceride 143 <150 MG/DL    HDL Cholesterol 46 MG/DL    LDL, calculated 73.4 0 - 100 MG/DL    VLDL, calculated 28.6 MG/DL    CHOL/HDL Ratio 3.2 0 - 5.0     GLUCOSE, POC    Collection Time: 10/20/18  4:39 PM   Result Value Ref Range    Glucose (POC) 140 (H) 65 - 100 mg/dL    Performed by Suzette Jean-Baptiste            Assessment:     1. Euglycemic diabetic ketoacidosis    Plan:     1. Euglycemic diabetic ketoacidosis- I suspect that her DKA was triggered by use of empagliflozin, SGLT2 inhbitor, which was started on Monday. The mechanism of action of SGLT2 inhibitor triggering DKA is due to decrease in blood sugar leads to decrease in insulin levels, which then leads to gluconeogenesis and ketogenesis. I suspect that her low carbohydrate intake since her gastric bypass surgery also lead to increased gluconeogenesis and hence increased ketone formation  The fact that patient was slowly able to reduce her insulin doses at home makes the diagnosis of type 1 diabetes less likely but agree that this needs to be ruled out with fasting c-peptide levels tomorrow morning. C-peptides will likely be low but should still be detectable in type 2 diabetic after gastric bypass.    -Continue with D5 fluids, which will slow down gluconeogensis, bicarb drip, and Insulin drip to help close the anion gap  -Discussed with patient and family that she is not to resume Empaglifozin (Arlyss Coca) and would obviously also avoid metformin at time of discharge  -If blood sugars are elevated after insulin drip and D5 are stopped, then can use prn insulin as needed. We will continue to follow with you as well.      Signed By: Tabby Robles MD     October 20, 2018

## 2018-10-20 NOTE — H&P
[facility] Hospitalist Admission Note NAME:  Shayne Frank :   1975 MRN:  [id number] PCP:  JOSUE Balderrama Date/Time:  10/20/2018 2:09 PM 
 
  
  
Subjective: CHIEF COMPLAINT: \"I came in for constipation\" HISTORY OF PRESENT ILLNESS:    
Ms. Sarai Valencia is a 37 y.o.  female with a hx of DM2 and obesity with recent gastric bypass who presented to the Emergency Department complaining of constipation. Patient reports that she has been doing well in the post operative period with significant weight reduction and has come off nearly all medications for DM2. Over the past week, she has felt more bloated and has not been able to have a BM. Last night, she attempted miralax, bisacodyl supp., and a fleets enema without success. After presenting to ED, initial lab work demonstrates high anion gap metabolic acidosis of unclear etiology. We will admit for further management. Past Medical History:  
Diagnosis Date  Chronic pain   
 ULISSES. HIPS  Diabetes ([address] IDDM  History of esophageal dilatation   Tuberculosis  H/O POS. PPD - [place] [facility]INE MOS. ANTIBX THERAPY Past Surgical History:  
Procedure Laterality Date  HX DILATION AND CURETTAGE    HX ORTHOPAEDIC   ULISSES. CARPAL TUNNEL  
 HX UROLOGICAL  2017 BLADDER SURGERY - MESH Social History Tobacco Use  Smoking status: Never Smoker  Smokeless tobacco: Never Used Substance Use Topics  Alcohol use: Yes Comment: 2 DRINKS PER MONTH Family History Problem Relation Age of Onset  Cancer Mother   
     breast  
 Diabetes Father  Heart Disease Maternal Grandmother  Heart Disease Paternal Grandfather  Anesth Problems Neg Hx Family hx cannot be fully assessed, due to the admitting conditions No Known Allergies Prior to Admission medications Medication Sig Start Date End Date Taking? Authorizing Provider oxyCODONE IR (ROXICODONE) 5 mg immediate release tablet Take 1 Tab by mouth every four (4) hours as needed. Max Daily Amount: 30 mg. 8/16/18  Yes Niesha Allen MD  
ergocalciferol (ERGOCALCIFEROL) 50,000 unit capsule Take 1 Cap by mouth every Monday and Friday. 7/20/18  Yes Radha Nova, NP  
rosuvastatin (CRESTOR) 20 mg tablet Take 20 mg by mouth nightly. Yes Provider, Historical  
empagliflozin (JARDIANCE) 25 mg tablet Take 10 mg by mouth daily. Yes Provider, Historical  
lisinopril (PRINIVIL, ZESTRIL) 5 mg tablet Take 5 mg by mouth nightly. Yes Provider, Historical  
 
 
Review of Systems: 
(bold if positive, if negative) Gen:  Eyes:  ENT:  CVS:  Pulm:  GI:  constipation :   
MS:  Skin:  Psych:  Endo:   
Hem:  Renal:   
Neuro:    
  
Objective: VITALS:   
Vital signs reviewed; most recent are: 
 
Visit Vitals /79 (BP 1 Location: Right arm, BP Patient Position: At rest) Pulse (!) 103 Temp 98.5 °F (36.9 °C) Resp 18 Ht 5' (1.524 m) Wt 64.9 kg (143 lb) SpO2 99% BMI 27.93 kg/m² SpO2 Readings from Last 6 Encounters:  
10/20/18 99% 10/11/18 98% 09/27/18 98% 09/13/18 99% 08/28/18 98% 08/17/18 96% Intake/Output Summary (Last 24 hours) at 10/20/2018 1409 Last data filed at 10/20/2018 1211 Gross per 24 hour Intake  Output 400 ml Net -400 ml Exam:  
 
Physical Exam: 
 
Gen:  Well-developed, well-nourished, in no acute distress HEENT:  Pink conjunctivae, PERRL, hearing intact to voice, moist mucous membranes Neck:  Supple, without masses, thyroid non-tender Resp:  No accessory muscle use, clear breath sounds without wheezes rales or rhonchi 
Card:  No murmurs, normal S1, S2 without thrills, bruits or peripheral edema Abd:  Soft, non-tender, non-distended, normoactive bowel sounds are present, no palpable organomegaly and no detectable hernias Lymph:  No cervical or inguinal adenopathy Musc:  No cyanosis or clubbing Skin:  No rashes or ulcers, skin turgor is good Neuro:  Cranial nerves are grossly intact, no focal motor weakness, follows commands appropriately Psych:  Good insight, oriented to person, place and time, alert Labs: 
 
Recent Labs 10/20/18 
0456 WBC 19.9* HGB 15.6 HCT 48.2*  
 Recent Labs 10/20/18 
1055 10/20/18 
6793 * 133* K 4.9 5.3*  
 98 CO2 9* 10* * 157* BUN 6 10 CREA 0.82 0.81 CA 8.6 10.0 ALB  --  4.4 TBILI  --  0.4 SGOT  --  23 ALT  --  40 Lab Results Component Value Date/Time Glucose (POC) 151 (H) 10/20/2018 10:51 AM  
 Glucose (POC) 113 (H) 08/17/2018 10:40 AM  
 
No results for input(s): PH, PCO2, PO2, HCO3, FIO2 in the last 72 hours. No results for input(s): INR in the last 72 hours. No lab exists for component: INREXT All Micro Results Procedure Component Value Units Date/Time CULTURE, BLOOD, PAIRED [[id number]] Collected:  10/20/18 0617 Order Status:  Completed Specimen:  Blood Updated:  10/20/18 3089 Special Requests: NO SPECIAL REQUESTS Culture result: NO GROWTH AFTER 2 HOURS     
 CULTURE, BLOOD [[id number]] Order Status:  Canceled Specimen:  Blood I have reviewed previous records Assessment and Plan:  
  
Principal Problem: 
 
High anion gap metabolic acidosis / Anthonette Heritage / Acetonemia. Unclear etiology at present. After initial battery of tests, overwhelming presence of glucose in urine (DESPITE BG in 150s), heavy ketone presence, and remarkably reduced sodium bicarb, I am favoring normoglycemic diabetic ketoacidosis. This phenomenon can be seen with SQ insulin in patients with kidney dysfunction (which she has not taken for 3 weeks) but can also be seen SGLT2 inhibitors (which she takes a low dose). Malabsorption and starvation ketoacidosis are certainly playing a role but this level of acidosis, serum acetone, and bicarb reduction is NOT common in this disorder. As such, plan is as follows: - Transfer to [place] 
- Initiate insulin gtt with concomitant D5 gtt - Frequent BMP and magnesium - check volatile screen to r/o isopropyl alcohol ingestion - Nephrology consult to help illicit a cause (such as RTA 1/2) ADDENDUM: 
- Case discussed with nephrology and endocrinology. Will hold on insulin gtt now. Both teams will evaluate patient. Also potential for D-lactic acidosis, will start dextrose 5% gtt for this. Check beta-hydroxybuterate and ethyl alcohol level as well. Active Problems: 
  Diabetes mellitus type 2. Previously insulin dependent but has come off almost ALL of her DM medications since gastric bypass S/P gastric bypass (10/20/2018). Approx. 2 mo post-op with significant reduction in disease burden and weight. Following with Dr. Dwight Huerta Leukocytosis (10/20/2018). I suspect this is a stress reaction. Given abnormalities, will give empiric levaquin and monitor cultures. Constipation. Ongoing for 1 week. Attempted ducolax supp and fleets enema yesterday without success. May be related to underlying issue Elevated D-Dimer. CTA for PE done and rules out PE Telemetry reviewed:   normal sinus rhythm Risk of deterioration: high Total time spent with patient: 90 minutes Care Plan discussed with: Patient, Family, Nursing Staff, Consultant/Specialist and >50% of time spent in counseling and coordination of care Discussed:  Code Status, Care Plan and D/C Planning      
___________________________________________________ Attending Physician: Sivan Rebolledo DO

## 2018-10-20 NOTE — PROGRESS NOTES
[facility]: MED RECONCILIATION Comments/Recommendations: Of note pt reports not taking multiple medications (see below) Medications added:  
 
· none Medications removed: · Aspirin 81 mg  
· Iron · Glipizide · Insulin glargine · Insulin glulisine · Lorazepam 
· Metformin · Nystatin · Omeprazole · zofran Medications adjusted: · Jardiance dose changed from 25 mg to 10 mg per pt Information obtained from: patient Significant PMH/Disease States:  
Past Medical History:  
Diagnosis Date  Chronic pain   
 ULISSES. HIPS  Diabetes ([address] IDDM  History of esophageal dilatation 2008  Tuberculosis 2015 H/O POS. PPD - [place] NINE MOS. ANTIBX THERAPY Chief Complaint for this Admission: Chief Complaint Patient presents with  Constipation  Shortness of Breath Allergies: Patient has no known allergies. Prior to Admission Medications:  
 
Medication Documentation Review Audit Reviewed by LALITHA Scales (Pharmacist) on 10/20/18 at 9438 Medication Sig Documenting Provider Last Dose Status Taking?  
empagliflozin (JARDIANCE) 25 mg tablet Take 10 mg by mouth daily. Provider, Historical 10/19/2018 AM Active Yes  
ergocalciferol (ERGOCALCIFEROL) 50,000 unit capsule Take 1 Cap by mouth every Monday and Friday. Karyna Chawla NP 10/15/2018 AM Active Yes  
lisinopril (PRINIVIL, ZESTRIL) 5 mg tablet Take 5 mg by mouth nightly. Provider, Historical 10/18/2018 PM Active Yes  
oxyCODONE IR (ROXICODONE) 5 mg immediate release tablet Take 1 Tab by mouth every four (4) hours as needed. Max Daily Amount: 30 mg. Arie Felton MD 10/19/2018 PM Active Yes  
rosuvastatin (CRESTOR) 20 mg tablet Take 20 mg by mouth nightly. Provider, Historical 10/18/2018 PM Active Yes Juan Ramon Miranda, DEMETRIUSD   Contact: Charlie Landaverde Pharm. D. Clinical Staff Pharmacist 
Andrew Rizvi  [phone]

## 2018-10-20 NOTE — ED NOTES
PROGRESS NOTE: 
8:07 AM 
Spoke to Radiology, CT normal. 
 
8:12 AM 
ED EKG interpretation: 
Rhythm: sinus tachycardia; and regular . Rate (approx.): 110; Axis: normal; P wave: normal; QRS interval: normal ; ST/T wave: T wave inverted; in  Lead lateral leads EKG documented by Diego Canales MD, scribe, as interpreted by Tawanna Wakefield MD, ED MD. 
 
Ct nl. ? DKA vs PNA vs PE, was on injectables prior for DM but no longer. Consider PE as well Spoke with Dr Gamal Partida who will admit

## 2018-10-20 NOTE — PROGRESS NOTES
TRANSFER - IN REPORT: 
 
Verbal report received from Michell(name) on Janice Barreto  being received from ED(unit) for routine progression of care Report consisted of patients Situation, Background, Assessment and  
Recommendations(SBAR). Information from the following report(s) SBAR, Kardex, ED Summary, Intake/Output, MAR, Recent Results, Med Rec Status and Cardiac Rhythm Sinus Tach was reviewed with the receiving nurse. Opportunity for questions and clarification was provided. Assessment completed upon patients arrival to unit and care assumed. 1411: TRANSFER - OUT REPORT: 
 
Verbal report given to Estella(name) on Janice Barreto  being transferred to [facility] for urgent transfer Report consisted of patients Situation, Background, Assessment and  
Recommendations(SBAR). Information from the following report(s) SBAR, Kardex, ED Summary, Procedure Summary, Intake/Output, MAR, Recent Results, Med Rec Status and Cardiac Rhythm Sinus Tach was reviewed with the receiving nurse. Lines:  
Peripheral IV 10/20/18 Right Antecubital (Active) Site Assessment Clean, dry, & intact 10/20/2018  4:47 AM  
Phlebitis Assessment 0 10/20/2018  4:47 AM  
Infiltration Assessment 0 10/20/2018  4:47 AM  
Dressing Status Clean, dry, & intact 10/20/2018  4:47 AM  
Dressing Type Tape;Transparent 10/20/2018  4:47 AM  
Hub Color/Line Status Pink;Flushed;Patent 10/20/2018  4:47 AM  
  
 
Opportunity for questions and clarification was provided. Patient transported with: 
 Registered Nurse Tech

## 2018-10-20 NOTE — ED PROVIDER NOTES
Pt is a 35yo female with PMH of Type 2 Diabetes, s/p gastic bypass in Aug 2018 who presents with 1 week history of constipation. Pt. Notes she had uneventful gastric bypass Aug. 15 and since then has been having good PO intake, and watery/soft stool every few days since then. Her last BM was last Saturday. She denied any distension or abdominal pain throughout the week. Last night patient starting feeling more constipated and took Miralax at 9pm and a suppository at 11pm, and she had one small watery BM. Associated symptoms include shortness of breath, not related to exertion, generalized fatigue and weakness, dry mouth, and feeling \"dehydrated\". She denies fever, vomiting, abdominal pain, dysuria, but does endorse occasional nausea and chills Past Medical History:  
Diagnosis Date  Chronic pain   
 ULISSES. HIPS  Diabetes ([facility] [address] IDDM  History of esophageal dilatation 2008  Tuberculosis 2015 H/O POS. PPD - [place] NINE MOS. ANTIBX THERAPY Past Surgical History:  
Procedure Laterality Date  HX DILATION AND CURETTAGE  2003  HX ORTHOPAEDIC  2003 ULISSES. CARPAL TUNNEL  
 HX UROLOGICAL  01/2017 BLADDER SURGERY - MESH Family History:  
Problem Relation Age of Onset  Cancer Mother   
     breast  
 Diabetes Father  Heart Disease Maternal Grandmother  Heart Disease Paternal Grandfather  Anesth Problems Neg Hx Social History Socioeconomic History  Marital status:  Spouse name: Not on file  Number of children: Not on file  Years of education: Not on file  Highest education level: Not on file Social Needs  Financial resource strain: Not on file  Food insecurity - worry: Not on file  Food insecurity - inability: Not on file  Transportation needs - medical: Not on file  Transportation needs - non-medical: Not on file Occupational History  Not on file Tobacco Use  Smoking status: Never Smoker  Smokeless tobacco: Never Used Substance and Sexual Activity  Alcohol use: Yes Comment: 2 DRINKS PER MONTH  
 Drug use: No  
 Sexual activity: Yes Birth control/protection: Surgical  
Other Topics Concern  Not on file Social History Narrative  Not on file ALLERGIES: Patient has no known allergies. Review of Systems Constitutional: Positive for chills. Negative for appetite change and fever. HENT:  
     Dry mouth Eyes: Negative for discharge. Respiratory: Positive for shortness of breath. Negative for chest tightness and wheezing. Cardiovascular: Negative for chest pain and leg swelling. Gastrointestinal: Positive for constipation and nausea. Negative for abdominal distention, abdominal pain, diarrhea and vomiting. Genitourinary: Negative for dysuria. Neurological: Positive for weakness and headaches. Negative for syncope. Psychiatric/Behavioral: The patient is not nervous/anxious. Vitals:  
 10/20/18 0400 10/20/18 0415 BP: 135/78 (!) 128/96 Pulse: (!) 117 Resp: 16 Temp: 97.4 °F (36.3 °C) SpO2: 100% 99% Weight: 64.9 kg (143 lb) Height: 5' (1.524 m) Physical Exam  
Constitutional: She is oriented to person, place, and time. She appears well-developed and well-nourished. Non-toxic appearance. HENT:  
Head: Normocephalic and atraumatic. Mouth/Throat: Oropharynx is clear and moist.  
Dry mucosal membranes Eyes: EOM are normal.  
Neck: Normal range of motion. Cardiovascular: Regular rhythm. No murmur heard. tachycardic Pulmonary/Chest: Effort normal and breath sounds normal. No tachypnea. No respiratory distress. Abdominal: Soft. She exhibits no distension. There is no tenderness. There is no guarding. Hypoactive BS Musculoskeletal:  
     Right lower leg: Normal. She exhibits no edema. Left lower leg: Normal. She exhibits no edema. Neurological: She is alert and oriented to person, place, and time. Skin: Skin is warm and dry. No rash noted. Skin tenting noted on dorsal surface of b/l hands Psychiatric: She has a normal mood and affect. Her behavior is normal.  
  
 
MDM Number of Diagnoses or Management Options Diagnosis management comments: 4407 Pt is a 37yo female with PMH of recent gastric bypass who presents with constipation, weakness and shortness of breath. Given clinical signs of possible volume depletion, will get CMP, and will get CT abdomen to rule out SBO and assess stool burden before treating constipation [address] Labs show leukocytosis, high anion gap metabolic acidosis, and ketones in urine. Still awaiting CT, concern for intra abdominal infection/SBO. Will start 2nd NS Bolus (BPs have been stable), IV Zosyn, ordered blood cultures and lactic acid pending 
 
3034 Lactic acid normal, CT results pending, patient signed out to Dr. Troy Linares who is takeing over care of patient Procedures Patient is being admitted to the hospital.  The results of their tests and reasons for their admission have been discussed with them and/or available family. They convey agreement and understanding for the need to be admitted and for their admission diagnosis. Consultation will be made now with the inpatient physician for hospitalization. I personally saw and examined the patient. I have reviewed and agree with the residents findings, including all diagnostic interpretations, and plans as written. I was present during the key portions of separately billed procedures.  
Lynn Park MD

## 2018-10-20 NOTE — PROGRESS NOTES
Pt with limited venous accesses the lab works obtained with difficulties. Despite that another IV line needed to be established. Getting helps from ICU nurses . As soon the line available insulin drip will be started.

## 2018-10-21 LAB
ADMINISTERED INITIALS, ADMINIT: NORMAL
ANION GAP SERPL CALC-SCNC: 11 MMOL/L (ref 5–15)
ANION GAP SERPL CALC-SCNC: 13 MMOL/L (ref 5–15)
ANION GAP SERPL CALC-SCNC: 16 MMOL/L (ref 5–15)
ANION GAP SERPL CALC-SCNC: 17 MMOL/L (ref 5–15)
ANION GAP SERPL CALC-SCNC: 17 MMOL/L (ref 5–15)
ANION GAP SERPL CALC-SCNC: 19 MMOL/L (ref 5–15)
ATRIAL RATE: 111 BPM
BASOPHILS # BLD: 0 K/UL (ref 0–0.1)
BASOPHILS NFR BLD: 1 % (ref 0–1)
BUN SERPL-MCNC: 4 MG/DL (ref 6–20)
BUN SERPL-MCNC: 4 MG/DL (ref 6–20)
BUN SERPL-MCNC: 5 MG/DL (ref 6–20)
BUN SERPL-MCNC: 6 MG/DL (ref 6–20)
BUN/CREAT SERPL: 12 (ref 12–20)
BUN/CREAT SERPL: 5 (ref 12–20)
BUN/CREAT SERPL: 6 (ref 12–20)
BUN/CREAT SERPL: 7 (ref 12–20)
CALCIUM SERPL-MCNC: 8.8 MG/DL (ref 8.5–10.1)
CALCIUM SERPL-MCNC: 8.9 MG/DL (ref 8.5–10.1)
CALCIUM SERPL-MCNC: 9 MG/DL (ref 8.5–10.1)
CALCIUM SERPL-MCNC: 9 MG/DL (ref 8.5–10.1)
CALCULATED P AXIS, ECG09: 55 DEGREES
CALCULATED R AXIS, ECG10: 34 DEGREES
CALCULATED T AXIS, ECG11: 46 DEGREES
CHLORIDE SERPL-SCNC: 100 MMOL/L (ref 97–108)
CHLORIDE SERPL-SCNC: 101 MMOL/L (ref 97–108)
CHLORIDE SERPL-SCNC: 99 MMOL/L (ref 97–108)
CO2 SERPL-SCNC: 13 MMOL/L (ref 21–32)
CO2 SERPL-SCNC: 16 MMOL/L (ref 21–32)
CO2 SERPL-SCNC: 17 MMOL/L (ref 21–32)
CO2 SERPL-SCNC: 18 MMOL/L (ref 21–32)
CO2 SERPL-SCNC: 20 MMOL/L (ref 21–32)
CO2 SERPL-SCNC: 22 MMOL/L (ref 21–32)
CREAT SERPL-MCNC: 0.42 MG/DL (ref 0.55–1.02)
CREAT SERPL-MCNC: 0.71 MG/DL (ref 0.55–1.02)
CREAT SERPL-MCNC: 0.73 MG/DL (ref 0.55–1.02)
CREAT SERPL-MCNC: 0.73 MG/DL (ref 0.55–1.02)
CREAT SERPL-MCNC: 0.76 MG/DL (ref 0.55–1.02)
CREAT SERPL-MCNC: 0.85 MG/DL (ref 0.55–1.02)
D50 ADMINISTERED, D50ADM: 0 ML
D50 ORDER, D50ORD: 0 ML
DIAGNOSIS, 93000: NORMAL
DIFFERENTIAL METHOD BLD: ABNORMAL
EOSINOPHIL # BLD: 0.1 K/UL (ref 0–0.4)
EOSINOPHIL NFR BLD: 2 % (ref 0–7)
ERYTHROCYTE [DISTWIDTH] IN BLOOD BY AUTOMATED COUNT: 15.7 % (ref 11.5–14.5)
GLSCOM COMMENTS: NORMAL
GLUCOSE BLD STRIP.AUTO-MCNC: 133 MG/DL (ref 65–100)
GLUCOSE BLD STRIP.AUTO-MCNC: 143 MG/DL (ref 65–100)
GLUCOSE BLD STRIP.AUTO-MCNC: 153 MG/DL (ref 65–100)
GLUCOSE BLD STRIP.AUTO-MCNC: 153 MG/DL (ref 65–100)
GLUCOSE BLD STRIP.AUTO-MCNC: 154 MG/DL (ref 65–100)
GLUCOSE BLD STRIP.AUTO-MCNC: 156 MG/DL (ref 65–100)
GLUCOSE BLD STRIP.AUTO-MCNC: 156 MG/DL (ref 65–100)
GLUCOSE BLD STRIP.AUTO-MCNC: 161 MG/DL (ref 65–100)
GLUCOSE BLD STRIP.AUTO-MCNC: 167 MG/DL (ref 65–100)
GLUCOSE BLD STRIP.AUTO-MCNC: 168 MG/DL (ref 65–100)
GLUCOSE BLD STRIP.AUTO-MCNC: 172 MG/DL (ref 65–100)
GLUCOSE BLD STRIP.AUTO-MCNC: 176 MG/DL (ref 65–100)
GLUCOSE BLD STRIP.AUTO-MCNC: 205 MG/DL (ref 65–100)
GLUCOSE BLD STRIP.AUTO-MCNC: 217 MG/DL (ref 65–100)
GLUCOSE BLD STRIP.AUTO-MCNC: 226 MG/DL (ref 65–100)
GLUCOSE BLD STRIP.AUTO-MCNC: 240 MG/DL (ref 65–100)
GLUCOSE SERPL-MCNC: 148 MG/DL (ref 65–100)
GLUCOSE SERPL-MCNC: 158 MG/DL (ref 65–100)
GLUCOSE SERPL-MCNC: 162 MG/DL (ref 65–100)
GLUCOSE SERPL-MCNC: 164 MG/DL (ref 65–100)
GLUCOSE SERPL-MCNC: 176 MG/DL (ref 65–100)
GLUCOSE SERPL-MCNC: ABNORMAL MG/DL (ref 65–100)
GLUCOSE, GLC: 133 MG/DL
GLUCOSE, GLC: 143 MG/DL
GLUCOSE, GLC: 153 MG/DL
GLUCOSE, GLC: 154 MG/DL
GLUCOSE, GLC: 156 MG/DL
GLUCOSE, GLC: 156 MG/DL
GLUCOSE, GLC: 161 MG/DL
GLUCOSE, GLC: 168 MG/DL
GLUCOSE, GLC: 172 MG/DL
GLUCOSE, GLC: 176 MG/DL
GLUCOSE, GLC: 205 MG/DL
GLUCOSE, GLC: 226 MG/DL
HCT VFR BLD AUTO: 40.5 % (ref 35–47)
HGB BLD-MCNC: 13.7 G/DL (ref 11.5–16)
HIGH TARGET, HITG: 180 MG/DL
IMM GRANULOCYTES # BLD: 0 K/UL (ref 0–0.04)
IMM GRANULOCYTES NFR BLD AUTO: 1 % (ref 0–0.5)
INSULIN ADMINSTERED, INSADM: 0.7 UNITS/HOUR
INSULIN ADMINSTERED, INSADM: 0.8 UNITS/HOUR
INSULIN ADMINSTERED, INSADM: 0.9 UNITS/HOUR
INSULIN ADMINSTERED, INSADM: 0.9 UNITS/HOUR
INSULIN ADMINSTERED, INSADM: 1 UNITS/HOUR
INSULIN ADMINSTERED, INSADM: 1.1 UNITS/HOUR
INSULIN ADMINSTERED, INSADM: 1.1 UNITS/HOUR
INSULIN ADMINSTERED, INSADM: 1.2 UNITS/HOUR
INSULIN ADMINSTERED, INSADM: 1.7 UNITS/HOUR
INSULIN ADMINSTERED, INSADM: 2.9 UNITS/HOUR
INSULIN ORDER, INSORD: 0.7 UNITS/HOUR
INSULIN ORDER, INSORD: 0.8 UNITS/HOUR
INSULIN ORDER, INSORD: 0.9 UNITS/HOUR
INSULIN ORDER, INSORD: 0.9 UNITS/HOUR
INSULIN ORDER, INSORD: 1 UNITS/HOUR
INSULIN ORDER, INSORD: 1.1 UNITS/HOUR
INSULIN ORDER, INSORD: 1.1 UNITS/HOUR
INSULIN ORDER, INSORD: 1.2 UNITS/HOUR
INSULIN ORDER, INSORD: 1.7 UNITS/HOUR
INSULIN ORDER, INSORD: 2.9 UNITS/HOUR
LOW TARGET, LOT: 140 MG/DL
LYMPHOCYTES # BLD: 2.8 K/UL (ref 0.8–3.5)
LYMPHOCYTES NFR BLD: 31 % (ref 12–49)
MAGNESIUM SERPL-MCNC: 1.8 MG/DL (ref 1.6–2.4)
MAGNESIUM SERPL-MCNC: NORMAL MG/DL (ref 1.6–2.4)
MCH RBC QN AUTO: 25.5 PG (ref 26–34)
MCHC RBC AUTO-ENTMCNC: 33.8 G/DL (ref 30–36.5)
MCV RBC AUTO: 75.3 FL (ref 80–99)
MINUTES UNTIL NEXT BG, NBG: 120 MIN
MINUTES UNTIL NEXT BG, NBG: 60 MIN
MONOCYTES # BLD: 0.7 K/UL (ref 0–1)
MONOCYTES NFR BLD: 8 % (ref 5–13)
MULTIPLIER, MUL: 0.01
MULTIPLIER, MUL: 0.02
NEUTS SEG # BLD: 5.1 K/UL (ref 1.8–8)
NEUTS SEG NFR BLD: 58 % (ref 32–75)
NRBC # BLD: 0 K/UL (ref 0–0.01)
NRBC BLD-RTO: 0 PER 100 WBC
ORDER INITIALS, ORDINIT: NORMAL
P-R INTERVAL, ECG05: 148 MS
PLATELET # BLD AUTO: 301 K/UL (ref 150–400)
PMV BLD AUTO: 8.6 FL (ref 8.9–12.9)
POTASSIUM SERPL-SCNC: 3.2 MMOL/L (ref 3.5–5.1)
POTASSIUM SERPL-SCNC: 3.3 MMOL/L (ref 3.5–5.1)
POTASSIUM SERPL-SCNC: 3.5 MMOL/L (ref 3.5–5.1)
POTASSIUM SERPL-SCNC: ABNORMAL MMOL/L (ref 3.5–5.1)
Q-T INTERVAL, ECG07: 342 MS
QRS DURATION, ECG06: 78 MS
QTC CALCULATION (BEZET), ECG08: 465 MS
RBC # BLD AUTO: 5.38 M/UL (ref 3.8–5.2)
SERVICE CMNT-IMP: ABNORMAL
SODIUM SERPL-SCNC: 131 MMOL/L (ref 136–145)
SODIUM SERPL-SCNC: 132 MMOL/L (ref 136–145)
SODIUM SERPL-SCNC: 133 MMOL/L (ref 136–145)
SODIUM SERPL-SCNC: 134 MMOL/L (ref 136–145)
SODIUM SERPL-SCNC: 134 MMOL/L (ref 136–145)
SODIUM SERPL-SCNC: 135 MMOL/L (ref 136–145)
VENTRICULAR RATE, ECG03: 111 BPM
WBC # BLD AUTO: 8.8 K/UL (ref 3.6–11)

## 2018-10-21 PROCEDURE — 74011636637 HC RX REV CODE- 636/637: Performed by: INTERNAL MEDICINE

## 2018-10-21 PROCEDURE — 82962 GLUCOSE BLOOD TEST: CPT

## 2018-10-21 PROCEDURE — 80048 BASIC METABOLIC PNL TOTAL CA: CPT | Performed by: INTERNAL MEDICINE

## 2018-10-21 PROCEDURE — 84681 ASSAY OF C-PEPTIDE: CPT | Performed by: INTERNAL MEDICINE

## 2018-10-21 PROCEDURE — 74011000250 HC RX REV CODE- 250: Performed by: INTERNAL MEDICINE

## 2018-10-21 PROCEDURE — 83735 ASSAY OF MAGNESIUM: CPT | Performed by: INTERNAL MEDICINE

## 2018-10-21 PROCEDURE — 74011000258 HC RX REV CODE- 258: Performed by: INTERNAL MEDICINE

## 2018-10-21 PROCEDURE — 74011250637 HC RX REV CODE- 250/637: Performed by: SURGERY

## 2018-10-21 PROCEDURE — 74011250636 HC RX REV CODE- 250/636: Performed by: INTERNAL MEDICINE

## 2018-10-21 PROCEDURE — 94760 N-INVAS EAR/PLS OXIMETRY 1: CPT

## 2018-10-21 PROCEDURE — 86341 ISLET CELL ANTIBODY: CPT | Performed by: INTERNAL MEDICINE

## 2018-10-21 PROCEDURE — 74011250637 HC RX REV CODE- 250/637: Performed by: INTERNAL MEDICINE

## 2018-10-21 PROCEDURE — 85025 COMPLETE CBC W/AUTO DIFF WBC: CPT | Performed by: INTERNAL MEDICINE

## 2018-10-21 PROCEDURE — 65660000000 HC RM CCU STEPDOWN

## 2018-10-21 RX ORDER — CALCIUM CARBONATE 200(500)MG
200 TABLET,CHEWABLE ORAL
Status: DISCONTINUED | OUTPATIENT
Start: 2018-10-21 | End: 2018-10-22 | Stop reason: HOSPADM

## 2018-10-21 RX ORDER — SODIUM CHLORIDE 9 MG/ML
75 INJECTION, SOLUTION INTRAVENOUS CONTINUOUS
Status: DISCONTINUED | OUTPATIENT
Start: 2018-10-21 | End: 2018-10-22 | Stop reason: HOSPADM

## 2018-10-21 RX ORDER — POTASSIUM CHLORIDE 750 MG/1
40 TABLET, FILM COATED, EXTENDED RELEASE ORAL 2 TIMES DAILY
Status: COMPLETED | OUTPATIENT
Start: 2018-10-21 | End: 2018-10-21

## 2018-10-21 RX ORDER — INSULIN LISPRO 100 [IU]/ML
INJECTION, SOLUTION INTRAVENOUS; SUBCUTANEOUS
Status: DISCONTINUED | OUTPATIENT
Start: 2018-10-21 | End: 2018-10-22

## 2018-10-21 RX ORDER — THERA TABS 400 MCG
1 TAB ORAL 2 TIMES DAILY
Status: DISCONTINUED | OUTPATIENT
Start: 2018-10-21 | End: 2018-10-22 | Stop reason: HOSPADM

## 2018-10-21 RX ORDER — PSEUDOEPHEDRINE HCL 30 MG
250 TABLET ORAL
Status: DISCONTINUED | OUTPATIENT
Start: 2018-10-21 | End: 2018-10-21

## 2018-10-21 RX ADMIN — ANTACID TABLETS 200 MG: 500 TABLET, CHEWABLE ORAL at 20:56

## 2018-10-21 RX ADMIN — DEXTROSE MONOHYDRATE 75 ML/HR: 5 INJECTION, SOLUTION INTRAVENOUS at 04:53

## 2018-10-21 RX ADMIN — INSULIN LISPRO 1 UNITS: 100 INJECTION, SOLUTION INTRAVENOUS; SUBCUTANEOUS at 23:43

## 2018-10-21 RX ADMIN — POTASSIUM CHLORIDE 40 MEQ: 750 TABLET, FILM COATED, EXTENDED RELEASE ORAL at 17:20

## 2018-10-21 RX ADMIN — Medication 10 ML: at 14:00

## 2018-10-21 RX ADMIN — Medication 1 TABLET: at 11:31

## 2018-10-21 RX ADMIN — INSULIN LISPRO 1 UNITS: 100 INJECTION, SOLUTION INTRAVENOUS; SUBCUTANEOUS at 20:56

## 2018-10-21 RX ADMIN — THERA TABS 1 TABLET: TAB at 17:19

## 2018-10-21 RX ADMIN — ANTACID TABLETS 200 MG: 500 TABLET, CHEWABLE ORAL at 18:42

## 2018-10-21 RX ADMIN — LIDOCAINE HYDROCHLORIDE 40 ML: 20 SOLUTION ORAL; TOPICAL at 17:19

## 2018-10-21 RX ADMIN — POTASSIUM CHLORIDE 40 MEQ: 750 TABLET, FILM COATED, EXTENDED RELEASE ORAL at 11:33

## 2018-10-21 RX ADMIN — WATER: 1000 INJECTION, SOLUTION INTRAVENOUS at 12:39

## 2018-10-21 RX ADMIN — ENOXAPARIN SODIUM 40 MG: 40 INJECTION SUBCUTANEOUS at 11:31

## 2018-10-21 RX ADMIN — BISACODYL 10 MG: 5 TABLET, COATED ORAL at 11:31

## 2018-10-21 RX ADMIN — LEVOFLOXACIN 750 MG: 750 TABLET, FILM COATED ORAL at 11:30

## 2018-10-21 RX ADMIN — Medication 1 TABLET: at 17:19

## 2018-10-21 RX ADMIN — ZOLPIDEM TARTRATE 5 MG: 5 TABLET ORAL at 22:30

## 2018-10-21 RX ADMIN — THERA TABS 1 TABLET: TAB at 11:31

## 2018-10-21 RX ADMIN — SODIUM CHLORIDE 75 ML/HR: 900 INJECTION, SOLUTION INTRAVENOUS at 18:34

## 2018-10-21 NOTE — PROGRESS NOTES
Replaced by [facility] Medical Progress Note NAME: Kiki Valadez :  1975 MRM:  [id number] Date/Time: 10/21/2018  11:36 AM 
 
  
Assessment and Plan:  
 
High anion gap metabolic acidosis / Nya Risser / Acetonemia. Consistent with euglycemic diabetic ketoacidosis precipitated by SGLT2 inhibitors superimposed on malabsorption, low carb diet. Appreciate thoughtful insight from nephrology and endocrinology. Anti-VADIM and c-peptide pending to exclude underlying type 1 DM. Per endo, continue insulin, bicarb, d5 gtt until gap closes. Will need close level monitoring for 8 hours post-insulin gtt. She is not to take jaridance or metformin at discharge. Her DM plan is still in flux at discharge. 
  
Active Problems: 
 Diabetes mellitus type 2. Previously insulin dependent but has come off almost ALL of her DM medications since gastric bypass. Will not go on SLGT2 inhibitor or metformin. 
  
S/P gastric bypass (10/20/2018). Approx. 2 mo post-op with significant reduction in disease burden and weight. Courtesy consult to Dr. Maria Handy 
  
Leukocytosis (10/20/2018). I suspect this is a stress reaction. Stop levaquin. 
  
Constipation. Ongoing for 1 week. Attempted ducolax supp and fleets enema yesterday without success. May be related to underlying issue. Continue bowel regimen and titrate to BM 
  
Elevated D-Dimer. CTA for PE done and rules out PE Subjective: Chief Complaint:  Patient seen and examined. Chart reviewed. Patient reports feeling well. ROS: 
(bold if positive, if negative) Tolerating PT  Tolerating Diet Objective:  
 
Last 24hrs VS reviewed since prior progress note. Most recent are: 
 
Visit Vitals /74 Pulse 100 Temp 98.3 °F (36.8 °C) Resp 18 Ht 5' (1.524 m) Wt 64.9 kg (143 lb) SpO2 99% BMI 27.93 kg/m² SpO2 Readings from Last 6 Encounters:  
10/21/18 99% 10/11/18 98% 18 98% 18 99% 18 98% 18 96% Intake/Output Summary (Last 24 hours) at 10/21/2018 1136 Last data filed at 10/21/2018 8407 Gross per 24 hour Intake 2831.24 ml Output 3900 ml Net -1068.76 ml Physical Exam: 
 
Gen:  Well-developed, well-nourished, in no acute distress HEENT:  Pink conjunctivae, PERRL, hearing intact to voice, moist mucous membranes Neck:  Supple, without masses, thyroid non-tender Resp:  No accessory muscle use, clear breath sounds without wheezes rales or rhonchi 
Card:  No murmurs, normal S1, S2 without thrills, bruits or peripheral edema Abd:  Soft, non-tender, non-distended, normoactive bowel sounds are present, no palpable organomegaly and no detectable hernias Lymph:  No cervical or inguinal adenopathy Musc:  No cyanosis or clubbing Skin:  No rashes or ulcers, skin turgor is good Neuro:  Cranial nerves are grossly intact, no focal motor weakness, follows commands appropriately Psych:  Good insight, oriented to person, place and time, alert Telemetry reviewed:   normal sinus rhythm 
__________________________________________________________________ Medications Reviewed: (see below) Medications:  
 
Current Facility-Administered Medications Medication Dose Route Frequency  therapeutic multivitamin (THERAGRAN) tablet 1 Tab  1 Tab Oral BID  potassium chloride SR (KLOR-CON 10) tablet 40 mEq  40 mEq Oral BID  sodium bicarbonate (8.4%) 150 mEq in sterile water 1,000 mL infusion   IntraVENous CONTINUOUS  
 levoFLOXacin (LEVAQUIN) tablet 750 mg  750 mg Oral Q24H  
 sodium chloride (NS) flush 5-10 mL  5-10 mL IntraVENous Q8H  
 sodium chloride (NS) flush 5-10 mL  5-10 mL IntraVENous PRN  
 zolpidem (AMBIEN) tablet 5 mg  5 mg Oral QHS PRN  
 glucose chewable tablet 16 g  4 Tab Oral PRN  
 dextrose (D50W) injection syrg 12.5-25 g  25-50 mL IntraVENous PRN  
 glucagon (GLUCAGEN) injection 1 mg  1 mg IntraMUSCular PRN  
 ondansetron (ZOFRAN) injection 4 mg  4 mg IntraVENous Q4H PRN  
  enoxaparin (LOVENOX) injection 40 mg  40 mg SubCUTAneous Q24H  
 insulin lispro (HUMALOG) injection   SubCUTAneous AC&HS  traMADol (ULTRAM) tablet 50 mg  50 mg Oral Q6H PRN  
 HYDROcodone-acetaminophen (NORCO) 5-325 mg per tablet 1 Tab  1 Tab Oral Q4H PRN  
 bisacodyl (DULCOLAX) tablet 10 mg  10 mg Oral DAILY PRN  
 senna-docusate (PERICOLACE) 8.6-50 mg per tablet 1 Tab  1 Tab Oral BID  dextrose 5% infusion  75 mL/hr IntraVENous CONTINUOUS  
 insulin regular (NOVOLIN R, HUMULIN R) 100 Units in 0.9% sodium chloride 100 mL infusion  1-10 Units/hr IntraVENous TITRATE  dextrose (D50W) injection syrg 12.5-25 g  25-50 mL IntraVENous PRN Lab Data Reviewed: (see below) Lab Review:  
 
Recent Labs 10/21/18 
0350 10/20/18 
7550 WBC 8.8 19.9* HGB 13.7 15.6 HCT 40.5 48.2*  
 396 Recent Labs 10/21/18 
6956 10/21/18 
2599 10/21/18 
0350  10/20/18 
5087 * 134* 132*   < > 133* K 3.2* 3.2* HEMOLYZED,RECOLLECT REQUESTED   < > 5.3*  
 100 100   < > 98  
CO2 18* 17* 16*   < > 10* * 162* HEMOLYZED,RECOLLECT REQUESTED   < > 157* BUN 4* 4* 5*   < > 10 CREA 0.71 0.73 0.42*   < > 0.81 CA 9.0 8.9 8.8   < > 10.0 MG 1.8 1.8 HEMOLYZED,RECOLLECT REQUESTED   < >  --   
ALB  --   --   --   --  4.4 TBILI  --   --   --   --  0.4 SGOT  --   --   --   --  23 ALT  --   --   --   --  40  
 < > = values in this interval not displayed. Lab Results Component Value Date/Time Glucose (POC) 172 (H) 10/21/2018 11:27 AM  
 Glucose (POC) 153 (H) 10/21/2018 09:09 AM  
 Glucose (POC) 153 (H) 10/21/2018 08:01 AM  
 Glucose (POC) 143 (H) 10/21/2018 06:45 AM  
 Glucose (POC) 154 (H) 10/21/2018 04:49 AM  
 
No results for input(s): PH, PCO2, PO2, HCO3, FIO2 in the last 72 hours. No results for input(s): INR in the last 72 hours. No lab exists for component: INREXT All Micro Results Procedure Component Value Units Date/Time CULTURE, BLOOD, PAIRED [[id number]] Collected:  10/20/18 0617 Order Status:  Completed Specimen:  Blood Updated:  10/21/18 6904 Special Requests: NO SPECIAL REQUESTS Culture result: NO GROWTH 1 DAY     
 CULTURE, BLOOD [[id number]] Order Status:  Canceled Specimen:  Blood I have reviewed notes of prior 24hr. Other pertinent lab: None Total time spent with patient: 45 Minutes Care Plan discussed with: Patient, Family, Nursing Staff, Consultant/Specialist and >50% of time spent in counseling and coordination of care Discussed:  Care Plan Prophylaxis:  Lovenox Disposition:  Home w/Family 
        
___________________________________________________ Attending Physician: Evelin Altamirano DO

## 2018-10-21 NOTE — PROGRESS NOTES
Labs much improved with increase HCO3 and lowering of AG with insulin drip. Really an endocrine issue. My service will see back as needed.

## 2018-10-21 NOTE — PROGRESS NOTES
Endocrinology follow up- chart review Events overnight noted. Insulin drip off for one hour and restarted for bs 226. Bicarb is rising and anion gap is closing, 17 this am. 
 
10/21/18 c-peptide and rani ab pending A/p 
[address] yr old female with suspected type 2 diabetes s/p RYGB in 8/18 admitted for euglycemic DKA suspected due to low carbohydrate intake with use of Jardiance (SGLT2 inhibitor) 
-Continue insulin drip, bicarb gtt, and D5 drip until anion gap closes and then can taper off all drips 
-Will need to monitor blood sugar closely after insulin drip is stopped but at this time would not start basal insulin when drip is stopped unless c-peptide is consistent with type 1 diabetes. -When ready for discharge, will arrange for follow up in one week with Dr. Kike Carreno. -Patient is to not restart [place] Will continue to follow with you. Please call with any questions.

## 2018-10-21 NOTE — PROGRESS NOTES
Problem: Falls - Risk of 
Goal: *Absence of Falls Document Sandy Godinez Fall Risk and appropriate interventions in the flowsheet. Outcome: Progressing Towards Goal 
Fall Risk Interventions: 
Mobility Interventions: Patient to call before getting OOB Medication Interventions: Patient to call before getting OOB Elimination Interventions: Call light in reach

## 2018-10-21 NOTE — CONSULTS
Consult    Subjective:     Karl Little is a 37 y.o.  female who is being seen for history of Gastric bypass. Patient is 79 days out from surgery. She came to the hospital with complaints of malaise and constipation. She was found to be in DKA. Patient denies nausea, dysphagia and abdominal pain. Past Medical History:   Diagnosis Date    Chronic pain     ULISSES. HIPS    Diabetes ([address]     IDDM    History of esophageal dilatation 2008    Tuberculosis 2015    H/O POS. PPD - TX NINE MOS. ANTIBX THERAPY      Past Surgical History:   Procedure Laterality Date    HX DILATION AND CURETTAGE  2003    HX ORTHOPAEDIC  2003    ULISSES.  CARPAL TUNNEL    HX UROLOGICAL  01/2017    BLADDER SURGERY - MESH     Family History   Problem Relation Age of Onset    Cancer Mother         breast    Diabetes Father     Heart Disease Maternal Grandmother     Heart Disease Paternal Grandfather     Anesth Problems Neg Hx       Social History     Tobacco Use    Smoking status: Never Smoker    Smokeless tobacco: Never Used   Substance Use Topics    Alcohol use: Yes     Comment: 2 DRINKS PER MONTH       Current Facility-Administered Medications   Medication Dose Route Frequency Provider Last Rate Last Dose    sodium bicarbonate (8.4%) 150 mEq in sterile water 1,000 mL infusion   IntraVENous CONTINUOUS Lawerence ArmentaTim rocha V, DO 84 mL/hr at 10/20/18 2026      levoFLOXacin (LEVAQUIN) tablet 750 mg  750 mg Oral Q24H Mosie Reveal V, DO   750 mg at 10/20/18 1000    sodium chloride (NS) flush 5-10 mL  5-10 mL IntraVENous Q8H Lawerence Armenta, Tim Fransisco V, DO   Stopped at 10/20/18 2200    sodium chloride (NS) flush 5-10 mL  5-10 mL IntraVENous PRN Lawerence Armenta, [place] Fransisco V, DO        zolpidem (AMBIEN) tablet 5 mg  5 mg Oral QHS PRN [place] Fransisco V, DO   5 mg at 10/20/18 2047    glucose chewable tablet 16 g  4 Tab Oral PRN Wyn Harness, DO        dextrose (D50W) injection syrg 12.5-25 g  25-50 mL IntraVENous PRN Wyn Harness, DO  glucagon (GLUCAGEN) injection 1 mg  1 mg IntraMUSCular PRN Anthony Damon Dominion, DO        ondansetron [facility]) injection 4 mg  4 mg IntraVENous Q4H PRN Nohemi Souza, Mayur Funezus V, DO        enoxaparin (LOVENOX) injection 40 mg  40 mg SubCUTAneous Q24H Mayur Damonus V, DO   40 mg at 10/20/18 1040    insulin lispro (HUMALOG) injection   SubCUTAneous AC&HS Anthony Damoninion, DO   Stopped at 10/20/18 1130    traMADol (ULTRAM) tablet 50 mg  50 mg Oral Q6H PRN Mayur Damonus V, DO   50 mg at 10/20/18 1041    HYDROcodone-acetaminophen (NORCO) 5-325 mg per tablet 1 Tab  1 Tab Oral Q4H PRN Juan Jose Midget V, DO   1 Tab at 10/20/18 2047    bisacodyl (DULCOLAX) tablet 10 mg  10 mg Oral DAILY PRN Mayur Damonus V, DO   10 mg at 10/20/18 1832    senna-docusate (PERICOLACE) 8.6-50 mg per tablet 1 Tab  1 Tab Oral BID Juan Jose Midget V, DO   1 Tab at 10/20/18 1832    dextrose 5% infusion  75 mL/hr IntraVENous CONTINUOUS Mayur Damon V, DO 75 mL/hr at 10/21/18 0453 75 mL/hr at 10/21/18 0453    insulin regular (NOVOLIN R, HUMULIN R) 100 Units in 0.9% sodium chloride 100 mL infusion  1-10 Units/hr IntraVENous TITRATE Mayur Damon V, DO 0.9 mL/hr at 10/21/18 0912 0.9 Units/hr at 10/21/18 0912    dextrose (D50W) injection syrg 12.5-25 g  25-50 mL IntraVENous PRN Juan Jose Midget V, DO            No Known Allergies     Review of Systems:  A comprehensive review of systems was negative except for that written in the History of Present Illness. Objective: Intake and Output:    No intake/output data recorded. 10/19 1901 - 10/21 0700  In: 2831.2 [P.O.:240; I.V.:2591.2]  Out: 3900 [Urine:3900]    Physical Exam:   Visit Vitals  /69 (BP 1 Location: Left arm, BP Patient Position: At rest)   Pulse 95   Temp 98.3 °F (36.8 °C)   Resp 16   Ht 5' (1.524 m)   Wt 143 lb (64.9 kg)   SpO2 99%   BMI 27.93 kg/m²     General:  Alert, cooperative, no distress, appears stated age.    Head:  Normocephalic, without obvious abnormality, atraumatic. Eyes:  Conjunctivae/corneas clear., EOMs intact. Lungs:   Clear to auscultation bilaterally. Heart:  Regular rate and rhythm, S1, S2 normal, no murmur, click, rub or gallop. Abdomen:   Soft, non-tender. Bowel sounds normal. No masses,  No organomegaly. Data Review:   Recent Results (from the past 24 hour(s))   DRUG SCREEN, URINE    Collection Time: 10/20/18 10:37 AM   Result Value Ref Range    AMPHETAMINES NEGATIVE  NEG      BARBITURATES NEGATIVE  NEG      BENZODIAZEPINES NEGATIVE  NEG      COCAINE NEGATIVE  NEG      METHADONE NEGATIVE  NEG      OPIATES NEGATIVE  NEG      PCP(PHENCYCLIDINE) NEGATIVE  NEG      THC (TH-CANNABINOL) NEGATIVE  NEG      Drug screen comment (NOTE)    SODIUM, UR, RANDOM    Collection Time: 10/20/18 10:37 AM   Result Value Ref Range    Sodium,urine random 128 MMOL/L   CREATININE, UR, RANDOM    Collection Time: 10/20/18 10:37 AM   Result Value Ref Range    Creatinine, urine 15.12 mg/dL   OSMOLALITY, UR    Collection Time: 10/20/18 10:37 AM   Result Value Ref Range    Osmolality,urine 681 MOSM/kg H2O   SAMPLES BEING HELD    Collection Time: 10/20/18 10:37 AM   Result Value Ref Range    SAMPLES BEING HELD 1CUP OF URINE, 1 GREY TOP URINE     COMMENT        Add-on orders for these samples will be processed based on acceptable specimen integrity and analyte stability, which may vary by analyte.    GLUCOSE, POC    Collection Time: 10/20/18 10:51 AM   Result Value Ref Range    Glucose (POC) 151 (H) 65 - 100 mg/dL    Performed by Cruz Le (PCT)    SALICYLATE    Collection Time: 10/20/18 11:18 AM   Result Value Ref Range    Salicylate level 3.8 2.8 - 20.0 MG/DL   ACETAMINOPHEN    Collection Time: 10/20/18 11:18 AM   Result Value Ref Range    Acetaminophen level <2 (L) 10 - 30 ug/mL   OSMOLALITY, SERUM/PLASMA    Collection Time: 10/20/18 11:18 AM   Result Value Ref Range    Osmolality, serum/plasma 298 mOsm/kg H2O   LIPID PANEL    Collection Time: 10/20/18 11:18 AM   Result Value Ref Range    LIPID PROFILE          Cholesterol, total 148 <200 MG/DL    Triglyceride 143 <150 MG/DL    HDL Cholesterol 46 MG/DL    LDL, calculated 73.4 0 - 100 MG/DL    VLDL, calculated 28.6 MG/DL    CHOL/HDL Ratio 3.2 0 - 5.0     ETHYL ALCOHOL    Collection Time: 10/20/18  4:30 PM   Result Value Ref Range    ALCOHOL(ETHYL),SERUM <10 <10 MG/DL   BETA-HYDROXYBUTYRATE    Collection Time: 10/20/18  4:30 PM   Result Value Ref Range    B-hydroxybutyrate >4.42 (H) <0.02 mmol/L   METABOLIC PANEL, BASIC    Collection Time: 10/20/18  4:30 PM   Result Value Ref Range    Sodium 130 (L) 136 - 145 mmol/L    Potassium 4.5 3.5 - 5.1 mmol/L    Chloride 100 97 - 108 mmol/L    CO2 11 (LL) 21 - 32 mmol/L    Anion gap 19 (H) 5 - 15 mmol/L    Glucose 130 (H) 65 - 100 mg/dL    BUN 6 6 - 20 MG/DL    Creatinine 0.85 0.55 - 1.02 MG/DL    BUN/Creatinine ratio 7 (L) 12 - 20      GFR est AA >60 >60 ml/min/1.73m2    GFR est non-AA >60 >60 ml/min/1.73m2    Calcium 9.4 8.5 - 10.1 MG/DL   MAGNESIUM    Collection Time: 10/20/18  4:30 PM   Result Value Ref Range    Magnesium 2.0 1.6 - 2.4 mg/dL   GLUCOSE, POC    Collection Time: 10/20/18  4:39 PM   Result Value Ref Range    Glucose (POC) 140 (H) 65 - 100 mg/dL    Performed by Akhil Galarza    GLUCOSE, POC    Collection Time: 10/20/18  5:41 PM   Result Value Ref Range    Glucose (POC) 149 (H) 65 - 100 mg/dL    Performed by Uziel Pratt    Collection Time: 10/20/18  5:41 PM   Result Value Ref Range    Glucose 149 mg/dL    Insulin order 1.8 units/hour    Insulin adminstered 1.8 units/hour    Multiplier 0.020     Low target 140 mg/dL    High target 180 mg/dL    D50 order 0.0 ml    D50 administered 0.00 ml    Minutes until next BG 60 min    Order initials skg     Administered initials skg     GLSCOM Comments     GLUCOSE, POC    Collection Time: 10/20/18  7:00 PM   Result Value Ref Range    Glucose (POC) 137 (H) 65 - 100 mg/dL    Performed by David Sal GLUCOSTABILIZER    Collection Time: 10/20/18  7:00 PM   Result Value Ref Range    Glucose 137 mg/dL    Insulin order 0.8 units/hour    Insulin adminstered 0.8 units/hour    Multiplier 0.010     Low target 140 mg/dL    High target 180 mg/dL    D50 order 0.0 ml    D50 administered 0.00 ml    Minutes until next BG 60 min    Order initials pj     Administered initials pj     GLSCOM Comments     GLUCOSE, POC    Collection Time: 10/20/18  7:58 PM   Result Value Ref Range    Glucose (POC) 172 (H) 65 - 100 mg/dL    Performed by [employer]    Collection Time: 10/20/18  8:02 PM   Result Value Ref Range    Glucose 172 mg/dL    Insulin order 1.1 units/hour    Insulin adminstered 1.1 units/hour    Multiplier 0.010     Low target 140 mg/dL    High target 180 mg/dL    D50 order 0.0 ml    D50 administered 0.00 ml    Minutes until next BG 60 min    Order initials pj     Administered initials pj     GLSCOM Comments     METABOLIC PANEL, BASIC    Collection Time: 10/20/18  8:22 PM   Result Value Ref Range    Sodium 129 (L) 136 - 145 mmol/L    Potassium 3.9 3.5 - 5.1 mmol/L    Chloride 99 97 - 108 mmol/L    CO2 12 (LL) 21 - 32 mmol/L    Anion gap 18 (H) 5 - 15 mmol/L    Glucose 161 (H) 65 - 100 mg/dL    BUN 6 6 - 20 MG/DL    Creatinine 0.91 0.55 - 1.02 MG/DL    BUN/Creatinine ratio 7 (L) 12 - 20      GFR est AA >60 >60 ml/min/1.73m2    GFR est non-AA >60 >60 ml/min/1.73m2    Calcium 9.2 8.5 - 10.1 MG/DL   MAGNESIUM    Collection Time: 10/20/18  8:22 PM   Result Value Ref Range    Magnesium 2.0 1.6 - 2.4 mg/dL   GLUCOSE, POC    Collection Time: 10/20/18  8:49 PM   Result Value Ref Range    Glucose (POC) 165 (H) 65 - 100 mg/dL    Performed by Delayne Asker    Collection Time: 10/20/18  8:51 PM   Result Value Ref Range    Glucose 165 mg/dL    Insulin order 1.1 units/hour    Insulin adminstered 1.1 units/hour    Multiplier 0.010     Low target 140 mg/dL    High target 180 mg/dL    D50 order 0.0 ml    D50 administered 0.00 ml    Minutes until next BG 60 min    Order initials pj     Administered initials hugo     GLSCOM Comments     GLUCOSE, POC    Collection Time: 10/20/18  9:56 PM   Result Value Ref Range    Glucose (POC) 134 (H) 65 - 100 mg/dL    Performed by Andrea Arnold    Collection Time: 10/20/18 10:00 PM   Result Value Ref Range    Glucose 134 mg/dL    Insulin order 0.0 units/hour    Insulin adminstered 0.0 units/hour    Multiplier 0.000     Low target 140 mg/dL    High target 180 mg/dL    D50 order 0.0 ml    D50 administered 0.00 ml    Minutes until next BG 60 min    Order initials pj     Administered initials hugo     GLSCOM Comments     GLUCOSTABILIZER    Collection Time: 10/20/18 10:03 PM   Result Value Ref Range    Glucose 134 mg/dL    Insulin order 0.0 units/hour    Insulin adminstered 0.0 units/hour    Multiplier 0.000     Low target 140 mg/dL    High target 180 mg/dL    D50 order 0.0 ml    D50 administered 0.00 ml    Minutes until next BG 60 min    Order initials pj     Administered initials hugo MONDRAGON Comments     GLUCOSE, POC    Collection Time: 10/20/18 10:50 PM   Result Value Ref Range    Glucose (POC) 148 (H) 65 - 100 mg/dL    Performed by Andrea Arnold    Collection Time: 10/20/18 10:51 PM   Result Value Ref Range    Glucose 148 mg/dL    Insulin order 0.0 units/hour    Insulin adminstered 0.0 units/hour    Multiplier 0.000     Low target 140 mg/dL    High target 180 mg/dL    D50 order 0.0 ml    D50 administered 0.00 ml    Minutes until next BG 60 min    Order initials p     Administered initials hugo MONDRAGON Comments     GLUCOSE, POC    Collection Time: 10/20/18 11:44 PM   Result Value Ref Range    Glucose (POC) 147 (H) 65 - 100 mg/dL    Performed by Andrea Arnold    Collection Time: 10/20/18 11:45 PM   Result Value Ref Range    Glucose 148 mg/dL    Insulin order 0.0 units/hour    Insulin adminstered 0.0 units/hour Multiplier 0.000     Low target 140 mg/dL    High target 180 mg/dL    D50 order 0.0 ml    D50 administered 0.00 ml    Minutes until next BG 60 min    Order initials pj     Administered initials hugo MONDRAGON Comments     METABOLIC PANEL, BASIC    Collection Time: 10/20/18 11:56 PM   Result Value Ref Range    Sodium 131 (L) 136 - 145 mmol/L    Potassium 3.5 3.5 - 5.1 mmol/L    Chloride 99 97 - 108 mmol/L    CO2 13 (LL) 21 - 32 mmol/L    Anion gap 19 (H) 5 - 15 mmol/L    Glucose 148 (H) 65 - 100 mg/dL    BUN 6 6 - 20 MG/DL    Creatinine 0.85 0.55 - 1.02 MG/DL    BUN/Creatinine ratio 7 (L) 12 - 20      GFR est AA >60 >60 ml/min/1.73m2    GFR est non-AA >60 >60 ml/min/1.73m2    Calcium 9.0 8.5 - 10.1 MG/DL   MAGNESIUM    Collection Time: 10/20/18 11:56 PM   Result Value Ref Range    Magnesium 1.8 1.6 - 2.4 mg/dL   GLUCOSE, POC    Collection Time: 10/21/18 12:48 AM   Result Value Ref Range    Glucose (POC) 226 (H) 65 - 100 mg/dL    Performed by Olivia Ritchie    Collection Time: 10/21/18 12:52 AM   Result Value Ref Range    Glucose 226 mg/dL    Insulin order 1.7 units/hour    Insulin adminstered 1.7 units/hour    Multiplier 0.010     Low target 140 mg/dL    High target 180 mg/dL    D50 order 0.0 ml    D50 administered 0.00 ml    Minutes until next BG 60 min    Order initials hugo     Administered initials hugo MONDRAGON Comments     GLUCOSE, POC    Collection Time: 10/21/18  1:49 AM   Result Value Ref Range    Glucose (POC) 168 (H) 65 - 100 mg/dL    Performed by Olivia Ritchie    Collection Time: 10/21/18  1:50 AM   Result Value Ref Range    Glucose 168 mg/dL    Insulin order 1.1 units/hour    Insulin adminstered 1.1 units/hour    Multiplier 0.010     Low target 140 mg/dL    High target 180 mg/dL    D50 order 0.0 ml    D50 administered 0.00 ml    Minutes until next BG 60 min    Order initials pj     Administered initials hugo MONDRAGON Comments     GLUCOSE, POC    Collection Time: 10/21/18  2:47 AM   Result Value Ref Range    Glucose (POC) 176 (H) 65 - 100 mg/dL    Performed by Davie Chavez    Collection Time: 10/21/18  2:48 AM   Result Value Ref Range    Glucose 176 mg/dL    Insulin order 1.2 units/hour    Insulin adminstered 1.2 units/hour    Multiplier 0.010     Low target 140 mg/dL    High target 180 mg/dL    D50 order 0.0 ml    D50 administered 0.00 ml    Minutes until next BG 60 min    Order initials pj     Administered initials pj     GLSCOM Comments     GLUCOSE, POC    Collection Time: 10/21/18  3:49 AM   Result Value Ref Range    Glucose (POC) 161 (H) 65 - 100 mg/dL    Performed by Joyce, BASIC    Collection Time: 10/21/18  3:50 AM   Result Value Ref Range    Sodium 132 (L) 136 - 145 mmol/L    Potassium HEMOLYZED,RECOLLECT REQUESTED 3.5 - 5.1 mmol/L    Chloride 100 97 - 108 mmol/L    CO2 16 (L) 21 - 32 mmol/L    Anion gap 16 (H) 5 - 15 mmol/L    Glucose HEMOLYZED,RECOLLECT REQUESTED 65 - 100 mg/dL    BUN 5 (L) 6 - 20 MG/DL    Creatinine 0.42 (L) 0.55 - 1.02 MG/DL    BUN/Creatinine ratio 12 12 - 20      GFR est AA >60 >60 ml/min/1.73m2    GFR est non-AA >60 >60 ml/min/1.73m2    Calcium 8.8 8.5 - 10.1 MG/DL   MAGNESIUM    Collection Time: 10/21/18  3:50 AM   Result Value Ref Range    Magnesium HEMOLYZED,RECOLLECT REQUESTED 1.6 - 2.4 mg/dL   CBC WITH AUTOMATED DIFF    Collection Time: 10/21/18  3:50 AM   Result Value Ref Range    WBC 8.8 3.6 - 11.0 K/uL    RBC 5.38 (H) 3.80 - 5.20 M/uL    HGB 13.7 11.5 - 16.0 g/dL    HCT 40.5 35.0 - 47.0 %    MCV 75.3 (L) 80.0 - 99.0 FL    MCH 25.5 (L) 26.0 - 34.0 PG    MCHC 33.8 30.0 - 36.5 g/dL    RDW 15.7 (H) 11.5 - 14.5 %    PLATELET 295 494 - 543 K/uL    MPV 8.6 (L) 8.9 - 12.9 FL    NRBC 0.0 0  WBC    ABSOLUTE NRBC 0.00 0.00 - 0.01 K/uL    NEUTROPHILS 58 32 - 75 %    LYMPHOCYTES 31 12 - 49 %    MONOCYTES 8 5 - 13 %    EOSINOPHILS 2 0 - 7 %    BASOPHILS 1 0 - 1 %    IMMATURE GRANULOCYTES 1 (H) 0.0 - 0.5 %    ABS. NEUTROPHILS 5.1 1.8 - 8.0 K/UL    ABS. LYMPHOCYTES 2.8 0.8 - 3.5 K/UL    ABS. MONOCYTES 0.7 0.0 - 1.0 K/UL    ABS. EOSINOPHILS 0.1 0.0 - 0.4 K/UL    ABS. BASOPHILS 0.0 0.0 - 0.1 K/UL    ABS. IMM.  GRANS. 0.0 0.00 - 0.04 K/UL    DF AUTOMATED     GLUCOSTABILIZER    Collection Time: 10/21/18  3:50 AM   Result Value Ref Range    Glucose 161 mg/dL    Insulin order 1.0 units/hour    Insulin adminstered 1.0 units/hour    Multiplier 0.010     Low target 140 mg/dL    High target 180 mg/dL    D50 order 0.0 ml    D50 administered 0.00 ml    Minutes until next BG 60 min    Order initials pj     Administered initials pj     GLSCOM Comments     GLUCOSE, POC    Collection Time: 10/21/18  4:49 AM   Result Value Ref Range    Glucose (POC) 154 (H) 65 - 100 mg/dL    Performed by Rupinder Gomez    Collection Time: 10/21/18  4:49 AM   Result Value Ref Range    Glucose 154 mg/dL    Insulin order 0.9 units/hour    Insulin adminstered 0.9 units/hour    Multiplier 0.010     Low target 140 mg/dL    High target 180 mg/dL    D50 order 0.0 ml    D50 administered 0.00 ml    Minutes until next  min    Order initials pj     Administered initials pj     GLSCOM Comments     METABOLIC PANEL, BASIC    Collection Time: 10/21/18  5:14 AM   Result Value Ref Range    Sodium 134 (L) 136 - 145 mmol/L    Potassium 3.2 (L) 3.5 - 5.1 mmol/L    Chloride 100 97 - 108 mmol/L    CO2 17 (L) 21 - 32 mmol/L    Anion gap 17 (H) 5 - 15 mmol/L    Glucose 162 (H) 65 - 100 mg/dL    BUN 4 (L) 6 - 20 MG/DL    Creatinine 0.73 0.55 - 1.02 MG/DL    BUN/Creatinine ratio 5 (L) 12 - 20      GFR est AA >60 >60 ml/min/1.73m2    GFR est non-AA >60 >60 ml/min/1.73m2    Calcium 8.9 8.5 - 10.1 MG/DL   MAGNESIUM    Collection Time: 10/21/18  5:14 AM   Result Value Ref Range    Magnesium 1.8 1.6 - 2.4 mg/dL   GLUCOSE, POC    Collection Time: 10/21/18  6:45 AM   Result Value Ref Range    Glucose (POC) 143 (H) 65 - 100 mg/dL    Performed by Marita Batista    GLUCOSTABILIZER    Collection Time: 10/21/18  6:46 AM   Result Value Ref Range    Glucose 143 mg/dL    Insulin order 0.8 units/hour    Insulin adminstered 0.8 units/hour    Multiplier 0.010     Low target 140 mg/dL    High target 180 mg/dL    D50 order 0.0 ml    D50 administered 0.00 ml    Minutes until next  min    Order initials pj     Administered initials pj     GLSCOM Comments     GLUCOSE, POC    Collection Time: 10/21/18  8:01 AM   Result Value Ref Range    Glucose (POC) 153 (H) 65 - 100 mg/dL    Performed by [address] [place] [address], [facility]    Collection Time: 10/21/18  8:54 AM   Result Value Ref Range    Sodium 135 (L) 136 - 145 mmol/L    Potassium 3.2 (L) 3.5 - 5.1 mmol/L    Chloride 100 97 - 108 mmol/L    CO2 18 (L) 21 - 32 mmol/L    Anion gap 17 (H) 5 - 15 mmol/L    Glucose 164 (H) 65 - 100 mg/dL    BUN 4 (L) 6 - 20 MG/DL    Creatinine 0.71 0.55 - 1.02 MG/DL    BUN/Creatinine ratio 6 (L) 12 - 20      GFR est AA >60 >60 ml/min/1.73m2    GFR est non-AA >60 >60 ml/min/1.73m2    Calcium 9.0 8.5 - 10.1 MG/DL   MAGNESIUM    Collection Time: 10/21/18  8:54 AM   Result Value Ref Range    Magnesium 1.8 1.6 - 2.4 mg/dL   GLUCOSE, POC    Collection Time: 10/21/18  9:09 AM   Result Value Ref Range    Glucose (POC) 153 (H) 65 - 100 mg/dL    Performed by Radha Lockwood    Collection Time: 10/21/18  9:11 AM   Result Value Ref Range    Glucose 153 mg/dL    Insulin order 0.9 units/hour    Insulin adminstered 0.9 units/hour    Multiplier 0.010     Low target 140 mg/dL    High target 180 mg/dL    D50 order 0.0 ml    D50 administered 0.00 ml    Minutes until next  min    Order initials nn     Administered initials nn     GLSCOM Comments         CT  (examied by me personally)  -  normal gastric bypass anatomy.   No acute process    Assessment:     Principal Problem:    High anion gap metabolic acidosis (97/28/2809)    Active Problems:    Diabetes (HCC) ()      S/P gastric bypass (10/20/2018)      Leukocytosis (10/20/2018)      Glucosuria (10/20/2018)      Acetonemia (10/20/2018)        Plan:      Will add protien shakes and vitamins     Signed By: Km House MD     October 21, 2018

## 2018-10-21 NOTE — PROGRESS NOTES
Rate verified for glucose infusion with assigned Nurse, Andreea Shirley. Required rate was unchanged and maintained at 1 unit of insulin per hour.

## 2018-10-21 NOTE — PROGRESS NOTES
Received orders from Dr. Fawad Parra to stop insulin drip now as well sodium bicarbs and D5%. Still close glucose check through the night as well as keep on with 4 hour metabolic recheck until further instruction.

## 2018-10-22 VITALS
WEIGHT: 143 LBS | HEIGHT: 60 IN | RESPIRATION RATE: 18 BRPM | HEART RATE: 93 BPM | DIASTOLIC BLOOD PRESSURE: 85 MMHG | TEMPERATURE: 98.4 F | SYSTOLIC BLOOD PRESSURE: 129 MMHG | OXYGEN SATURATION: 100 % | BODY MASS INDEX: 28.07 KG/M2

## 2018-10-22 PROBLEM — E11.10 DKA (DIABETIC KETOACIDOSES): Status: ACTIVE | Noted: 2018-10-22

## 2018-10-22 LAB
ACETONE,ACETX: 273 MG/L
ANION GAP SERPL CALC-SCNC: 13 MMOL/L (ref 5–15)
ANION GAP SERPL CALC-SCNC: 14 MMOL/L (ref 5–15)
APPEARANCE UR: CLEAR
BACTERIA URNS QL MICRO: NEGATIVE /HPF
BILIRUB UR QL CFM: NEGATIVE
BUN SERPL-MCNC: 5 MG/DL (ref 6–20)
BUN SERPL-MCNC: 6 MG/DL (ref 6–20)
BUN/CREAT SERPL: 8 (ref 12–20)
BUN/CREAT SERPL: 8 (ref 12–20)
C PEPTIDE SERPL-MCNC: 2.5 NG/ML (ref 1.1–4.4)
C PEPTIDE SERPL-MCNC: 2.8 NG/ML (ref 1.1–4.4)
CALCIUM SERPL-MCNC: 8.9 MG/DL (ref 8.5–10.1)
CALCIUM SERPL-MCNC: 8.9 MG/DL (ref 8.5–10.1)
CHAIN OF CUSTODY,CHC: NO
CHLORIDE SERPL-SCNC: 103 MMOL/L (ref 97–108)
CHLORIDE SERPL-SCNC: 105 MMOL/L (ref 97–108)
CO2 SERPL-SCNC: 19 MMOL/L (ref 21–32)
CO2 SERPL-SCNC: 21 MMOL/L (ref 21–32)
COLOR UR: ABNORMAL
CREAT SERPL-MCNC: 0.61 MG/DL (ref 0.55–1.02)
CREAT SERPL-MCNC: 0.71 MG/DL (ref 0.55–1.02)
EPITH CASTS URNS QL MICRO: ABNORMAL /LPF
ERYTHROCYTE [DISTWIDTH] IN BLOOD BY AUTOMATED COUNT: 15.4 % (ref 11.5–14.5)
ETHANOL,ETHX: NEGATIVE MG/L
GLUCOSE BLD STRIP.AUTO-MCNC: 141 MG/DL (ref 65–100)
GLUCOSE BLD STRIP.AUTO-MCNC: 152 MG/DL (ref 65–100)
GLUCOSE BLD STRIP.AUTO-MCNC: 152 MG/DL (ref 65–100)
GLUCOSE BLD STRIP.AUTO-MCNC: 180 MG/DL (ref 65–100)
GLUCOSE SERPL-MCNC: 139 MG/DL (ref 65–100)
GLUCOSE SERPL-MCNC: 182 MG/DL (ref 65–100)
GLUCOSE UR STRIP.AUTO-MCNC: 500 MG/DL
HCT VFR BLD AUTO: 38.3 % (ref 35–47)
HGB BLD-MCNC: 13 G/DL (ref 11.5–16)
HGB UR QL STRIP: NEGATIVE
ISOPROPANOL,ISOPX: NEGATIVE MG/L
KETONES UR QL STRIP.AUTO: >80 MG/DL
LEUKOCYTE ESTERASE UR QL STRIP.AUTO: NEGATIVE
MAGNESIUM SERPL-MCNC: 1.8 MG/DL (ref 1.6–2.4)
MCH RBC QN AUTO: 25.2 PG (ref 26–34)
MCHC RBC AUTO-ENTMCNC: 33.9 G/DL (ref 30–36.5)
MCV RBC AUTO: 74.4 FL (ref 80–99)
METHANOL,METHX: NEGATIVE MG/L
MUCOUS THREADS URNS QL MICRO: ABNORMAL /LPF
NITRITE UR QL STRIP.AUTO: NEGATIVE
NRBC # BLD: 0 K/UL (ref 0–0.01)
NRBC BLD-RTO: 0 PER 100 WBC
PH UR STRIP: 6 [PH] (ref 5–8)
PHOSPHATE SERPL-MCNC: 1.8 MG/DL (ref 2.6–4.7)
PLATELET # BLD AUTO: 293 K/UL (ref 150–400)
PMV BLD AUTO: 8.7 FL (ref 8.9–12.9)
POTASSIUM SERPL-SCNC: 3.4 MMOL/L (ref 3.5–5.1)
POTASSIUM SERPL-SCNC: 3.5 MMOL/L (ref 3.5–5.1)
PROT UR STRIP-MCNC: 30 MG/DL
RBC # BLD AUTO: 5.15 M/UL (ref 3.8–5.2)
RBC #/AREA URNS HPF: ABNORMAL /HPF (ref 0–5)
REPORT STATUS,RSTSX: ABNORMAL
SERVICE CMNT-IMP: ABNORMAL
SODIUM SERPL-SCNC: 136 MMOL/L (ref 136–145)
SODIUM SERPL-SCNC: 139 MMOL/L (ref 136–145)
SP GR UR REFRACTOMETRY: 1.03 (ref 1–1.03)
SPECIMEN SOURCE: ABNORMAL
UA: UC IF INDICATED,UAUC: ABNORMAL
UROBILINOGEN UR QL STRIP.AUTO: 0.2 EU/DL (ref 0.2–1)
WBC # BLD AUTO: 9 K/UL (ref 3.6–11)
WBC URNS QL MICRO: ABNORMAL /HPF (ref 0–4)

## 2018-10-22 PROCEDURE — 81001 URINALYSIS AUTO W/SCOPE: CPT | Performed by: INTERNAL MEDICINE

## 2018-10-22 PROCEDURE — 80048 BASIC METABOLIC PNL TOTAL CA: CPT | Performed by: INTERNAL MEDICINE

## 2018-10-22 PROCEDURE — 83735 ASSAY OF MAGNESIUM: CPT | Performed by: INTERNAL MEDICINE

## 2018-10-22 PROCEDURE — 85027 COMPLETE CBC AUTOMATED: CPT | Performed by: INTERNAL MEDICINE

## 2018-10-22 PROCEDURE — 84100 ASSAY OF PHOSPHORUS: CPT | Performed by: INTERNAL MEDICINE

## 2018-10-22 PROCEDURE — 82962 GLUCOSE BLOOD TEST: CPT

## 2018-10-22 PROCEDURE — 36415 COLL VENOUS BLD VENIPUNCTURE: CPT | Performed by: INTERNAL MEDICINE

## 2018-10-22 RX ORDER — AMOXICILLIN 250 MG
1 CAPSULE ORAL 2 TIMES DAILY
Qty: 60 TAB | Refills: 0 | Status: SHIPPED | OUTPATIENT
Start: 2018-10-22

## 2018-10-22 RX ORDER — INSULIN LISPRO 100 [IU]/ML
INJECTION, SOLUTION INTRAVENOUS; SUBCUTANEOUS
Status: DISCONTINUED | OUTPATIENT
Start: 2018-10-22 | End: 2018-10-22 | Stop reason: HOSPADM

## 2018-10-22 RX ORDER — INSULIN LISPRO 100 [IU]/ML
INJECTION, SOLUTION INTRAVENOUS; SUBCUTANEOUS
Qty: 1 VIAL | Refills: 1 | Status: SHIPPED | OUTPATIENT
Start: 2018-10-22

## 2018-10-22 RX ADMIN — Medication 10 ML: at 03:45

## 2018-10-22 NOTE — PROGRESS NOTES
Endocrinology follow up: Chart review Events overnight reviewed. Insulin drip stopped at midnight. Patient now on normal saline with morning bs of 139, bicarb of 21 and anion gap of 13. 
 
10/21/18 c-peptide and rani ab pending A/p 
37 yr old female with type 2 diabeted with recent RYGB admitted for normglycemic DKA suspected due to SGLT2 inhibitor and low carbohydrate intake. She is now off of insulin drip, bicarb drip, and D5gtt  and maintaining with closure of anion gap. 
 
-Patient is to not restart any SGLT inhibitor. Due to recent acidosis, will also avoid metformin. 
-Patient can be discharged on use of sliding scale short acting insulin. She was on apidra at home and can use current hospital scale as needed. 
-Will have her continue to keep blood sugar log and food log at home. Will arrange for follow up with Dr. Hood Anderson within a week. Please call with any questions.

## 2018-10-22 NOTE — PROGRESS NOTES
Shift Summary 1930: Bedside and Verbal shift change report given to Amber Melton RN (oncoming nurse) by Zeeshan Steiner RN (offgoing nurse). Report included the following information SBAR, Kardex, Procedure Summary, Intake/Output, MAR, Accordion, Recent Results and Cardiac Rhythm Stach. 2000: Family at bedside. Orders for q2 hour blood sugars but no sliding scale coverage ordered except for ACHS. Called Dr. Hammad Stevenson (on call MD) to clarify and received orders for sliding scale coverage (high sensitivity scale) q2 hours. Clarified that I was to call if the anion gap was increasing with BMP.  
 
2100: Patient has been complaining of reflux, feeling like something is in her throat. Administered her a \"Tums\". Was told in report about q4 BMPs, no current order. Will collect ordered metabolic panel in AM. Patient and family asking about anion gap, provided education. 2130: Assisted patient with taking a shower. Patient's HR continuing to get into 140s with exertion. 0300: Patient complaining of dysuria. Received order from Dr. Hammad Stevenson for urinalysis with reflex culture. Patient is continuing to complain of feeling like something is in her throat. 0730: Bedside and Verbal shift change report given to Zeeshan Steiner RN (oncoming nurse) by Amber Melton RN (offgoing nurse). Report included the following information SBAR, Kardex, Procedure Summary, Intake/Output, MAR, Accordion, Recent Results and Cardiac Rhythm Stach. Care Plan Summary Problem: Falls - Risk of 
Goal: *Absence of Falls Document [facility] Fall Risk and appropriate interventions in the flowsheet. Outcome: Progressing Towards Goal 
Fall Risk Interventions: 
Mobility Interventions: Patient to call before getting OOB Medication Interventions: Teach patient to arise slowly Elimination Interventions: Call light in reach

## 2018-10-22 NOTE — PROGRESS NOTES
Follow-up appointment on Tuesday October 30,2018 @ 12:[address] p[address]m. With Dr. Josue Davenport. Hospital PCP f/u appointment on Monday November 5,2018 @ 1:15 p.m. With Shay Wayne. Added to AVS. King Collet, CM Specialist

## 2018-10-22 NOTE — DISCHARGE SUMMARY
Eugenio Greenwood [facility]  [address]  [phone]    Physician Discharge Summary     Patient ID:  Rody Muñiz  [id number]  85 y.o.  1975    Admit date: 10/20/2018    Discharge date and time: 10/22/2018    Admission Diagnoses: Metabolic acidosis    Discharge Diagnoses:  Principal Diagnosis DKA (diabetic ketoacidoses) ([address]                                            Principal Problem:    DKA (diabetic ketoacidoses) ([address] (10/22/2018)    Active Problems:    Diabetes ([address] ()      High anion gap metabolic acidosis (50/92/6666)      S/P gastric bypass (10/20/2018)      Leukocytosis (10/20/2018)      Glucosuria (10/20/2018)      Acetonemia (10/20/2018)           Hospital Course:     38 yo hx of DM, gastric bypass, presented w/ AG metabolic acidosis due to euglycemic DKA from SGLT2 inhibitor    1) High anion gap metabolic acidosis / Glucosuria / Acetonemia: Consistent with euglycemic diabetic ketoacidosis precipitated by SGLT2 inhibitors superimposed on malabsorption, low carb diet. Appreciate thoughtful insight from nephrology and endocrinology. Anti-VADIM and c-peptide pending to exclude underlying type 1 DM. AG closed with bicarb gtt and insulin gtt. Endo recommended stopping Jardiance and start SSI until patient can f/u with Dr. Rojelio Cain (endo)     2) Diabetes mellitus type 2: Previously insulin dependent but has come off almost ALL of her DM medications since gastric bypass. Will not go on SLGT2 inhibitor or metformin. A1C was 7.4%     3) S/P gastric bypass:  Approx. 2 mo post-op with significant reduction in disease burden and weight. f/u with Dr. Poornima Melgar     4) Elevated D-Dimer.  CTA for PE done and rules out PE    PCP: JOSUE Beth     Consults: Endo, renal, gen surg    Significant Diagnostic Studies: none    Discharge Exam:  Physical Exam:    Gen: Well-developed, well-nourished, in no acute distress  HEENT:  Pink conjunctivae, PERRL, hearing intact to voice, moist mucous membranes  Neck: Supple, without masses, thyroid non-tender  Resp: No accessory muscle use, clear breath sounds without wheezes rales or rhonchi  Card: No murmurs, normal S1, S2 without thrills, bruits or peripheral edema  Abd:  Soft, non-tender, non-distended, normoactive bowel sounds are present, no palpable organomegaly and no detectable hernias  Lymph:  No cervical or inguinal adenopathy  Musc: No cyanosis or clubbing  Skin: No rashes or ulcers, skin turgor is good  Neuro:  Cranial nerves are grossly intact, no focal motor weakness, follows commands appropriately  Psych:  Good insight, oriented to person, place and time, alert    Disposition: home  Discharge Condition: Stable    Patient Instructions:   Current Discharge Medication List      START taking these medications    Details   insulin lispro (HUMALOG) 100 unit/mL injection Use as directed on sliINITIATE CORRECTIVE INSULIN PROTOCOL (MARGE):  RX MARGE Normal Sensitivity (Average weight)    AC (before meals), Q6H, and Q4H CORRECTIONAL SCALE only For Blood Sugar (mg/dl) of :             140-199=2 units            200-249=3 units  250-299=5 units  300-349=7 units  350 or great  Qty: 1 Vial, Refills: 1         CONTINUE these medications which have NOT CHANGED    Details   oxyCODONE IR (ROXICODONE) 5 mg immediate release tablet Take 1 Tab by mouth every four (4) hours as needed. Max Daily Amount: 30 mg.  Qty: 30 Tab, Refills: 0    Associated Diagnoses: Class 2 obesity with serious comorbidity and body mass index (BMI) of 35.0 to 35.9 in adult, unspecified obesity type      ergocalciferol (ERGOCALCIFEROL) 50,000 unit capsule Take 1 Cap by mouth every Monday and Friday. Qty: 12 Cap, Refills: 3      rosuvastatin (CRESTOR) 20 mg tablet Take 20 mg by mouth nightly. lisinopril (PRINIVIL, ZESTRIL) 5 mg tablet Take 5 mg by mouth nightly.          STOP taking these medications       empagliflozin (JARDIANCE) 25 mg tablet Comments:   Reason for Stopping: Activity: Activity as tolerated  Diet: Diabetic Diet  Wound Care: None needed    Follow-up with  Follow-up Information     Follow up With Specialties Details Why Contact Info    Debra Horner Physician Assistant Schedule an appointment as soon as possible for a visit in 1 week  [address] [place] [address]  [phone]      Chacorta Gallegos MD Endocrinology Schedule an appointment as soon as possible for a visit in 1 week  [address] [facility] Endocrinology and  Osteoporosis [address] [place] [address]  [phone]            Follow-up tests/labs: per Endo    Signed:  Isaías Antunez MD  10/22/2018  9:41 AM    I spent 32 min on discharge

## 2018-10-22 NOTE — DISCHARGE INSTRUCTIONS
HOSPITALIST DISCHARGE INSTRUCTIONS  NAME: Gayle Weiss   :  1975   MRN:  [id number]     Date/Time:  10/22/2018 9:40 AM    ADMIT DATE: 10/20/2018     DISCHARGE DATE: 10/22/2018     ADMITTING DIAGNOSIS:  Diabetic ketoacidosis from [facility] DIAGNOSIS:  same    MEDICATIONS:  See after visit summary       · It is important that you take the medication exactly as they are prescribed. · Keep your medication in the bottles provided by the pharmacist and keep a list of the medication names, dosages, and times to be taken in your wallet. · Do not take other medications without consulting your doctor     Pain Management: per above medications    What to do at Home    Recommended diet:  Diabetic Diet    Recommended activity: Activity as tolerated    1) Return to the hospital if you feel worse    2) If you experience any of the following symptoms then please call your primary care physician or return to the emergency room if you cannot get hold of your doctor:  Fever, chills, nausea, vomiting, diarrhea, change in mentation, falling, bleeding, shortness of breath, chest pain, severe headache, severe abdominal pain,     3) Stop jardiance. Follow sliding scale insulin until you follow up with Dr. Kimberley Stevens    Follow Up: Follow-up Information     Follow up With Specialties Details Why Contact Info    Debra Jesus Physician Assistant Schedule an appointment as soon as possible for a visit in 1 week  [address] [place] [address].  [address] [place] [address]  [phone]      Stephanie Manzano MD Endocrinology Schedule an appointment as soon as possible for a visit in 1 week  [facility] Endocrinology and  Osteoporosis Ctr  [address]  [phone]              Information obtained by :  I understand that if any problems occur once I am at home I am to contact my physician. I understand and acknowledge receipt of the instructions indicated above. Physician's or R.N.'s Signature                                                                  Date/Time                                                                                                                                              Patient or Representative Signature                                                          Date/Time

## 2018-10-22 NOTE — PROGRESS NOTES
Discharge instructions, including information on new medications, were reviewed with patient. All questions were answered. IV and heart monitor were removed. Patient received a copy of her discharge papers and 2 prescriptions and will be discharged home with her family.

## 2018-10-23 LAB — GAD65 AB SER-ACNC: <5 U/ML (ref 0–5)

## 2018-10-25 LAB
BACTERIA SPEC CULT: NORMAL
SERVICE CMNT-IMP: NORMAL

## 2018-11-29 ENCOUNTER — OFFICE VISIT (OUTPATIENT)
Dept: SURGERY | Age: 43
End: 2018-11-29

## 2018-11-29 VITALS
TEMPERATURE: 97.9 F | HEART RATE: 54 BPM | HEIGHT: 60 IN | RESPIRATION RATE: 16 BRPM | OXYGEN SATURATION: 98 % | BODY MASS INDEX: 28.07 KG/M2 | SYSTOLIC BLOOD PRESSURE: 103 MMHG | DIASTOLIC BLOOD PRESSURE: 54 MMHG | WEIGHT: 143 LBS

## 2018-11-29 DIAGNOSIS — G89.29 CHRONIC BILATERAL BACK PAIN, UNSPECIFIED BACK LOCATION: Primary | ICD-10-CM

## 2018-11-29 DIAGNOSIS — M54.9 CHRONIC BILATERAL BACK PAIN, UNSPECIFIED BACK LOCATION: Primary | ICD-10-CM

## 2018-11-29 DIAGNOSIS — R10.11 RUQ PAIN: ICD-10-CM

## 2018-11-29 NOTE — PROGRESS NOTES
1. Have you been to the ER, urgent care clinic since your last visit? Hospitalized since your last visit? Yes [facility] ED on 10/20/18 for diabetic ketoacidosis    2. Have you seen or consulted any other health care providers outside of the [address] [place] [address] since your last visit? Include any pap smears or colon screening.  No

## 2018-11-29 NOTE — PROGRESS NOTES
Subjective:      Gayle Weiss is a 37 y.o. female presents 106 days following GBP. She is down 43 pounds. He main complaint is upper back pain she thinks is from breast ptosis with weight loss. She also has had two episodes of RUQ pain associated with nausea after eating. Eating a regular diet without difficulty. Denies dysphagia and reflus  Bowel movements are regular. Taking all vitamins   Periodic exercise     Objective:     Visit Vitals  /54 (BP 1 Location: Left arm, BP Patient Position: Sitting)   Pulse (!) 54   Temp 97.9 °F (36.6 °C) (Oral)   Resp 16   Ht 5' (1.524 m)   Wt 143 lb (64.9 kg)   SpO2 98%   BMI 27.93 kg/m²       General:  alert, cooperative, no distress, appears stated age   Abdomen: soft, bowel sounds active, non-tender     RUQ U/S (5/2018) - no gallstones     Assessment:     1. Doing well after GBP. 2.  May have developed gallstones with rapid weight loss. Recommend repeat RUQ U/S. Patient would like to think about it and get back to me. 3.  Back pain -  Most likely from weight loss and breast ptosis. May eventually benefit from [address]. Recommend support Bryce Quinones from now     4.   Recommend increasing physical activity

## 2018-12-09 ENCOUNTER — APPOINTMENT (OUTPATIENT)
Dept: GENERAL RADIOLOGY | Age: 43
End: 2018-12-09
Attending: NURSE PRACTITIONER
Payer: COMMERCIAL

## 2018-12-09 ENCOUNTER — HOSPITAL ENCOUNTER (EMERGENCY)
Age: 43
Discharge: HOME | End: 2018-12-09
Attending: EMERGENCY MEDICINE
Payer: COMMERCIAL

## 2018-12-09 ENCOUNTER — APPOINTMENT (OUTPATIENT)
Dept: GENERAL RADIOLOGY | Age: 43
End: 2018-12-09
Attending: EMERGENCY MEDICINE
Payer: COMMERCIAL

## 2018-12-09 VITALS
HEIGHT: 59 IN | DIASTOLIC BLOOD PRESSURE: 77 MMHG | WEIGHT: 141 LBS | OXYGEN SATURATION: 100 % | SYSTOLIC BLOOD PRESSURE: 119 MMHG | HEART RATE: 94 BPM | RESPIRATION RATE: 17 BRPM | BODY MASS INDEX: 28.43 KG/M2 | TEMPERATURE: 98.1 F

## 2018-12-09 DIAGNOSIS — K59.00 CONSTIPATION, UNSPECIFIED CONSTIPATION TYPE: Primary | ICD-10-CM

## 2018-12-09 LAB
ALBUMIN SERPL-MCNC: 4 G/DL (ref 3.5–5)
ALBUMIN/GLOB SERPL: 1 {RATIO} (ref 1.1–2.2)
ALP SERPL-CCNC: 89 U/L (ref 45–117)
ALT SERPL-CCNC: 40 U/L (ref 12–78)
ANION GAP BLD CALC-SCNC: 20 MMOL/L (ref 10–20)
ANION GAP SERPL CALC-SCNC: 14 MMOL/L (ref 5–15)
AST SERPL-CCNC: 21 U/L (ref 15–37)
BASE DEFICIT BLDV-SCNC: 0.9 MMOL/L
BASOPHILS # BLD: 0.1 K/UL (ref 0–0.1)
BASOPHILS NFR BLD: 1 % (ref 0–1)
BDY SITE: ABNORMAL
BILIRUB SERPL-MCNC: 0.4 MG/DL (ref 0.2–1)
BUN BLD-MCNC: 10 MG/DL (ref 9–20)
BUN SERPL-MCNC: 11 MG/DL (ref 6–20)
BUN/CREAT SERPL: 19 (ref 12–20)
CA-I BLD-MCNC: 1.23 MMOL/L (ref 1.12–1.32)
CALCIUM SERPL-MCNC: 9.7 MG/DL (ref 8.5–10.1)
CHLORIDE BLD-SCNC: 104 MMOL/L (ref 98–107)
CHLORIDE SERPL-SCNC: 105 MMOL/L (ref 97–108)
CO2 BLD-SCNC: 24 MMOL/L (ref 21–32)
CO2 SERPL-SCNC: 24 MMOL/L (ref 21–32)
COMMENT, HOLDF: NORMAL
CREAT BLD-MCNC: 0.3 MG/DL (ref 0.6–1.3)
CREAT SERPL-MCNC: 0.58 MG/DL (ref 0.55–1.02)
DIFFERENTIAL METHOD BLD: ABNORMAL
EOSINOPHIL # BLD: 0.1 K/UL (ref 0–0.4)
EOSINOPHIL NFR BLD: 2 % (ref 0–7)
ERYTHROCYTE [DISTWIDTH] IN BLOOD BY AUTOMATED COUNT: 14.1 % (ref 11.5–14.5)
GLOBULIN SER CALC-MCNC: 4.2 G/DL (ref 2–4)
GLUCOSE BLD STRIP.AUTO-MCNC: 185 MG/DL (ref 65–100)
GLUCOSE BLD-MCNC: 202 MG/DL (ref 65–100)
GLUCOSE SERPL-MCNC: 202 MG/DL (ref 65–100)
HCO3 BLDV-SCNC: 24 MMOL/L (ref 23–28)
HCT VFR BLD AUTO: 41.1 % (ref 35–47)
HCT VFR BLD CALC: 40 % (ref 35–47)
HGB BLD-MCNC: 13.4 G/DL (ref 11.5–16)
IMM GRANULOCYTES # BLD: 0 K/UL (ref 0–0.04)
IMM GRANULOCYTES NFR BLD AUTO: 0 % (ref 0–0.5)
LACTATE BLD-SCNC: 0.67 MMOL/L (ref 0.4–2)
LIPASE SERPL-CCNC: 127 U/L (ref 73–393)
LYMPHOCYTES # BLD: 2.5 K/UL (ref 0.8–3.5)
LYMPHOCYTES NFR BLD: 26 % (ref 12–49)
MCH RBC QN AUTO: 26.3 PG (ref 26–34)
MCHC RBC AUTO-ENTMCNC: 32.6 G/DL (ref 30–36.5)
MCV RBC AUTO: 80.7 FL (ref 80–99)
MONOCYTES # BLD: 0.5 K/UL (ref 0–1)
MONOCYTES NFR BLD: 5 % (ref 5–13)
NEUTS SEG # BLD: 6.4 K/UL (ref 1.8–8)
NEUTS SEG NFR BLD: 66 % (ref 32–75)
NRBC # BLD: 0 K/UL (ref 0–0.01)
NRBC BLD-RTO: 0 PER 100 WBC
PCO2 BLDV: 39 MMHG (ref 41–51)
PH BLDV: 7.4 [PH] (ref 7.32–7.42)
PLATELET # BLD AUTO: 376 K/UL (ref 150–400)
PMV BLD AUTO: 8.7 FL (ref 8.9–12.9)
PO2 BLDV: 26 MMHG (ref 25–40)
POTASSIUM BLD-SCNC: 4.1 MMOL/L (ref 3.5–5.1)
POTASSIUM SERPL-SCNC: 4.1 MMOL/L (ref 3.5–5.1)
PROT SERPL-MCNC: 8.2 G/DL (ref 6.4–8.2)
RBC # BLD AUTO: 5.09 M/UL (ref 3.8–5.2)
SAMPLES BEING HELD,HOLD: NORMAL
SAO2 % BLDV: 49 % (ref 65–88)
SAO2% DEVICE SAO2% SENSOR NAME: ABNORMAL
SERVICE CMNT-IMP: ABNORMAL
SERVICE CMNT-IMP: ABNORMAL
SODIUM BLD-SCNC: 143 MMOL/L (ref 136–145)
SODIUM SERPL-SCNC: 143 MMOL/L (ref 136–145)
SPECIMEN SITE: ABNORMAL
WBC # BLD AUTO: 9.6 K/UL (ref 3.6–11)

## 2018-12-09 PROCEDURE — 82962 GLUCOSE BLOOD TEST: CPT

## 2018-12-09 PROCEDURE — 80053 COMPREHEN METABOLIC PANEL: CPT

## 2018-12-09 PROCEDURE — 83690 ASSAY OF LIPASE: CPT

## 2018-12-09 PROCEDURE — 74019 RADEX ABDOMEN 2 VIEWS: CPT

## 2018-12-09 PROCEDURE — 36415 COLL VENOUS BLD VENIPUNCTURE: CPT

## 2018-12-09 PROCEDURE — 96360 HYDRATION IV INFUSION INIT: CPT

## 2018-12-09 PROCEDURE — 80047 BASIC METABLC PNL IONIZED CA: CPT

## 2018-12-09 PROCEDURE — 82803 BLOOD GASES ANY COMBINATION: CPT

## 2018-12-09 PROCEDURE — 83605 ASSAY OF LACTIC ACID: CPT

## 2018-12-09 PROCEDURE — 85025 COMPLETE CBC W/AUTO DIFF WBC: CPT

## 2018-12-09 PROCEDURE — 99285 EMERGENCY DEPT VISIT HI MDM: CPT

## 2018-12-09 PROCEDURE — 74011250636 HC RX REV CODE- 250/636: Performed by: EMERGENCY MEDICINE

## 2018-12-09 RX ORDER — SODIUM CHLORIDE 9 MG/ML
100 INJECTION, SOLUTION INTRAVENOUS CONTINUOUS
Status: DISCONTINUED | OUTPATIENT
Start: 2018-12-09 | End: 2018-12-09

## 2018-12-09 RX ORDER — HYDROCORTISONE 25 MG/G
CREAM TOPICAL 4 TIMES DAILY
Qty: 30 G | Refills: 0 | Status: SHIPPED | OUTPATIENT
Start: 2018-12-09

## 2018-12-09 RX ADMIN — SODIUM CHLORIDE 1000 ML: 900 INJECTION, SOLUTION INTRAVENOUS at 12:32

## 2018-12-09 NOTE — ED NOTES
Pt reports \"multiple large BMs after enema. \"  Pt reports \"feeling a lot better. \"  NP notified. Pt OK to get ready for discharge.

## 2018-12-09 NOTE — DISCHARGE INSTRUCTIONS
Constipation: Care Instructions  Your Care Instructions    Constipation means that you have a hard time passing stools (bowel movements). People pass stools from 3 times a day to once every 3 days. What is normal for you may be different. Constipation may occur with pain in the rectum and cramping. The pain may get worse when you try to pass stools. Sometimes there are small amounts of bright red blood on toilet paper or the surface of stools. This is because of enlarged veins near the rectum (hemorrhoids). A few changes in your diet and lifestyle may help you avoid ongoing constipation. Your doctor may also prescribe medicine to help loosen your stool. Some medicines can cause constipation. These include pain medicines and antidepressants. Tell your doctor about all the medicines you take. Your doctor may want to make a medicine change to ease your symptoms. Follow-up care is a key part of your treatment and safety. Be sure to make and go to all appointments, and call your doctor if you are having problems. It's also a good idea to know your test results and keep a list of the medicines you take. How can you care for yourself at home? · Drink plenty of fluids, enough so that your urine is light yellow or clear like water. If you have kidney, heart, or liver disease and have to limit fluids, talk with your doctor before you increase the amount of fluids you drink. · Include high-fiber foods in your diet each day. These include fruits, vegetables, beans, and whole grains. · Get at least 30 minutes of exercise on most days of the week. Walking is a good choice. You also may want to do other activities, such as running, swimming, cycling, or playing tennis or team sports. · Take a fiber supplement, such as Citrucel or Metamucil, every day. Read and follow all instructions on the label. · Schedule time each day for a bowel movement. A daily routine may help.  Take your time having your bowel movement. · Support your feet with a small step stool when you sit on the toilet. This helps flex your hips and places your pelvis in a squatting position. · Your doctor may recommend an over-the-counter laxative to relieve your constipation. Examples are Milk of Magnesia and MiraLax. Read and follow all instructions on the label. Do not use laxatives on a long-term basis. When should you call for help? Call your doctor now or seek immediate medical care if:    · You have new or worse belly pain.     · You have new or worse nausea or vomiting.     · You have blood in your stools.    Watch closely for changes in your health, and be sure to contact your doctor if:    · Your constipation is getting worse.     · You do not get better as expected. Where can you learn more? Go to http://vivi-teagan.info/. Enter 21 [phone] in the search box to learn more about \"Constipation: Care Instructions. \"  Current as of: November 20, 2017  Content Version: 11.8  © 2981-0989 [employer]. Care instructions adapted under license by [employer] (which disclaims liability or warranty for this information). If you have questions about a medical condition or this instruction, always ask your healthcare professional. [address] any warranty or liability for your use of this information.

## 2018-12-09 NOTE — ED TRIAGE NOTES
LBM \"days ago\" Concerned she may be in DKA as this happened the last time she was. Blood sugars < 300 at home. Also having hemorrhoidal pain.

## 2018-12-09 NOTE — ED NOTES
NP at bedside to evaluate. Pt placed on monitor x 2. Plan of care and all test/meds ordered explained to pt. Good understanding and agreement with plan was verbalized. Call bell within reach.

## 2018-12-09 NOTE — ED PROVIDER NOTES
Patient is a 70-year-old female who presents with complaints of constipation and extreme hemorrhoids. She reports has been several days since her last bowel movement and only recent bowel movement has been a scant amount of brown liquid\". She is concerned as her previous episode of DKA was associated with constipation as well, normal glucose levels have been slightly higher than normal. She denies chest pain shortness of breath lightheadedness or dizziness. She has intermittent abdominal cramping however no sustained abdominal pain. She denies fever, nausea or vomiting. Past Medical History:  
Diagnosis Date  Chronic pain   
 ULISSES. HIPS  Diabetes ([address] IDDM  History of esophageal dilatation 2008  Tuberculosis 2015 H/O POS. PPD - [place] NINE MOS. ANTIBX THERAPY Past Surgical History:  
Procedure Laterality Date  HX DILATION AND CURETTAGE  2003  HX ORTHOPAEDIC  2003 ULISSES. CARPAL TUNNEL  
 HX UROLOGICAL  01/2017 BLADDER SURGERY - MESH Family History:  
Problem Relation Age of Onset  Cancer Mother   
     breast  
 Diabetes Father  Heart Disease Maternal Grandmother  Heart Disease Paternal Grandfather  Anesth Problems Neg Hx Social History Socioeconomic History  Marital status:  Spouse name: Not on file  Number of children: Not on file  Years of education: Not on file  Highest education level: Not on file Social Needs  Financial resource strain: Not on file  Food insecurity - worry: Not on file  Food insecurity - inability: Not on file  Transportation needs - medical: Not on file  Transportation needs - non-medical: Not on file Occupational History  Not on file Tobacco Use  Smoking status: Never Smoker  Smokeless tobacco: Never Used Substance and Sexual Activity  Alcohol use: Yes   Comment: 2 DRINKS PER MONTH  
 Drug use: No  
 Sexual activity: Yes  
 Birth control/protection: Surgical  
Other Topics Concern  Not on file Social History Narrative  Not on file ALLERGIES: Patient has no known allergies. Review of Systems Constitutional: Negative for activity change, appetite change, chills, diaphoresis, fatigue and fever. Respiratory: Negative for apnea, cough and chest tightness. Cardiovascular: Negative for chest pain and leg swelling. Gastrointestinal: Positive for abdominal pain, constipation and rectal pain. Negative for abdominal distention, anal bleeding, blood in stool, nausea and vomiting. Genitourinary: Negative for difficulty urinating. Vitals:  
 12/09/18 1103 BP: 141/76 Pulse: 94 Resp: 17 Temp: 98.1 °F (36.7 °C) SpO2: 98% Weight: 64 kg (141 lb) Height: 4' 11\" (1.499 m) Physical Exam  
Constitutional: She appears well-developed and well-nourished. No distress. HENT:  
Head: Normocephalic and atraumatic. Right Ear: External ear normal.  
Left Ear: External ear normal.  
Eyes: Pupils are equal, round, and reactive to light. Cardiovascular: Normal rate, regular rhythm, normal heart sounds and intact distal pulses. Exam reveals no gallop and no friction rub. No murmur heard. Pulmonary/Chest: Effort normal and breath sounds normal. No stridor. No respiratory distress. She has no wheezes. She has no rales. She exhibits no tenderness. Abdominal: Soft. She exhibits no distension and no mass. There is no tenderness. There is no rebound and no guarding. No hernia. Skin: Capillary refill takes less than 2 seconds. She is not diaphoretic. Psychiatric: She has a normal mood and affect. Her behavior is normal. Judgment and thought content normal.  
  
 
MDM Procedures Pt had large BP post enema. Will Rx proctofoam for external hemorrhoids and PRN colace.  Follow up with GI

## 2018-12-19 ENCOUNTER — TELEPHONE (OUTPATIENT)
Dept: SURGERY | Age: 43
End: 2018-12-19

## 2019-05-09 ENCOUNTER — OFFICE VISIT (OUTPATIENT)
Dept: SURGERY | Age: 44
End: 2019-05-09

## 2019-05-09 VITALS
TEMPERATURE: 98.1 F | HEART RATE: 88 BPM | DIASTOLIC BLOOD PRESSURE: 60 MMHG | HEIGHT: 60 IN | OXYGEN SATURATION: 99 % | RESPIRATION RATE: 16 BRPM | SYSTOLIC BLOOD PRESSURE: 102 MMHG | BODY MASS INDEX: 24.54 KG/M2 | WEIGHT: 125 LBS

## 2019-05-09 DIAGNOSIS — E66.01 MORBID OBESITY (HCC): Primary | ICD-10-CM

## 2019-05-09 DIAGNOSIS — Z98.84 S/P GASTRIC BYPASS: ICD-10-CM

## 2019-05-09 RX ORDER — METFORMIN HYDROCHLORIDE 1000 MG/1
1000 TABLET ORAL
COMMUNITY

## 2019-05-09 NOTE — PROGRESS NOTES
1. Have you been to the ER, urgent care clinic since your last visit? Hospitalized since your last visit? No    2. Have you seen or consulted any other health care providers outside of the [address] [place] [address] since your last visit? Include any pap smears or colon screening. No    Joe Chappell  Body composition    female  53 y.o. Vitals:    05/09/19 1123   BP: 102/60   Pulse: 88   Resp: 16   Temp: 98.1 °F (36.7 °C)   TempSrc: Oral   SpO2: 99%   Weight: 125 lb (56.7 kg)   Height: 5' (1.524 m)     Body mass index is 24.41 kg/m².

## 2019-05-09 NOTE — PROGRESS NOTES
HISTORY OF PRESENT ILLNESS  Jessica Hoffmann is a 53 y.o. female with previous Gastric bypass surgery 9 months ago. . . She has lost a total of 60 pounds since surgery. She  Has lost 18 lbs since the last ov. Body mass index is 24.41 kg/m². Judy Rm no nausea and no vomiting . Denies Acid reflux/heartburn. . Drinking  64 ounces of water daily. 40+ protein intake daily. She is not exercising. She states that her bowels alternated diarrhea and constipation.  +gas. Labs have been checked my her Endocrinologist. She states that her iron was slightly low at her last check-up. She is taking an iron supplement. Her blood sugars have been \"good. \"  Dietary recall -    Breakfast-  boyardee, 1/2 peanut butter sandwich  Lunch- smart ones, lean cuisine, roast and meatloaf  Dinner- 1 meatball and spaghetti, grilled chicken sandwich. She is snacking between meals; blackberries and starwberries, bugles, P3   Vitamins:  MVI : yes  Calcium : yes  B-Vit 12: yes    Patient has an advanced directive: not on file. Ms. Adriane Goltz has a reminder for a \"due or due soon\" health maintenance. I have asked that she contact her primary care provider for follow-up on this health maintenance. Co-Morbid(s)                COMORBIDITY     SLEEP APNEA                 NO        GERD  (req.meds)            NO  HYPERLIPIDEMIA            NO  HYPERTENSION              NO         DIABETES                         NO           Current Outpatient Medications:     metFORMIN (GLUCOPHAGE) 1,000 mg tablet, Take 1,000 mg by mouth., Disp: , Rfl:     GLIPIZIDE PO, Take  by mouth two (2) times a day., Disp: , Rfl:     senna-docusate (PERICOLACE) 8.6-50 mg per tablet, Take 1 Tab by mouth two (2) times a day., Disp: 60 Tab, Rfl: 0    ergocalciferol (ERGOCALCIFEROL) 50,000 unit capsule, Take 1 Cap by mouth every Monday and Friday., Disp: 12 Cap, Rfl: 3    rosuvastatin (CRESTOR) 20 mg tablet, Take 20 mg by mouth nightly.  Patient states she takes 1/2 tablet, Disp: , Rfl:     lisinopril (PRINIVIL, ZESTRIL) 5 mg tablet, Take 2.5 mg by mouth nightly., Disp: , Rfl:     hydrocortisone (PROCTOSOL HC) 2.5 % rectal cream, Insert  into rectum four (4) times daily. , Disp: 30 g, Rfl: 0    insulin lispro (HUMALOG) 100 unit/mL injection, Use as directed on sliINITIATE CORRECTIVE INSULIN PROTOCOL (MARGE): RX MARGE Normal Sensitivity (Average weight)  AC (before meals), Q6H, and Q4H CORRECTIONAL SCALE only For Blood Sugar (mg/dl) of :            140-199=2 units           200-249=3 units 250-299=5 units 300-349=7 units 350 or great, Disp: 1 Vial, Rfl: 1    oxyCODONE IR (ROXICODONE) 5 mg immediate release tablet, Take 1 Tab by mouth every four (4) hours as needed. Max Daily Amount: 30 mg., Disp: 30 Tab, Rfl: 0      Visit Vitals  /60 (BP 1 Location: Left arm, BP Patient Position: Sitting)   Pulse 88   Temp 98.1 °F (36.7 °C) (Oral)   Resp 16   Ht 5' (1.524 m)   Wt 125 lb (56.7 kg)   SpO2 99%   BMI 24.41 kg/m²     HPI    Review of Systems   Gastrointestinal: Positive for constipation and diarrhea. Negative for abdominal pain, heartburn, nausea and vomiting. Skin:        No rashes. Complains of loose skin       Physical Exam   Constitutional: She is oriented to person, place, and time. She appears well-developed and well-nourished. HENT:   Head: Normocephalic. Neck: Normal range of motion. Neck supple. Cardiovascular: Normal rate and regular rhythm. Exam reveals no gallop and no friction rub. No murmur heard. Pulmonary/Chest: Effort normal and breath sounds normal.   Abdominal: Soft. Bowel sounds are normal. She exhibits no distension. There is no tenderness. No masses or hernias noted. Neurological: She is alert and oriented to person, place, and time. Skin: Skin is warm and dry. Psychiatric: She has a normal mood and affect. ASSESSMENT and PLAN  Niels Lopez is a 53 y.o. female with previous Gastric bypass surgery 9 months ago.  . . She has lost a total of 60 pounds since surgery. She  Has lost 18 lbs since the last ov. Body mass index is 24.41 kg/m². Angelina Teresa no nausea and no vomiting . Denies Acid reflux/heartburn. . Drinking  64 ounces of water daily. 40+ protein intake daily. She is not exercising. She states that her bowels alternated diarrhea and constipation.  +gas. Labs have been checked my her Endocrinologist. She states that her iron was slightly low at her last check-up. She is taking an iron supplement. Diarrhea and constipation may be related to carb intake? Discussed reactive hypoglyemia/dumping. May try immodium and probiotics to see if this helps. If it does not, possible refer to GI. We will see what her iron level is at her next Endocrinologist check-up. Doing great with weight loss. Advised patient regard to diet that is high-protein, low-fat, low-sugar, limited carbohydrates. Strive for 50-60 grams of protein daily. If having a snack, foods that are protein or fiber rich. . No eating/drinking together, chew foods well, and portion control. Measure meals. Discussed snacking behavior and to [facility] pay attention to behavioral factor and habits. Drink at least 40-64 ounces of water or non-calorie/non-carbonated beverages daily. Continue vitamin regiment daily. Exercise at least 3 days a week with cardiovascular and strength training. Patient to follow up in 3 months. Advised to call office if any questions/concerns. Pt verbalized understanding and questions were answered to the best of my knowledge and ability.

## 2019-05-09 NOTE — PATIENT INSTRUCTIONS
Eating Healthy Foods: Care Instructions  Your Care Instructions    Eating healthy foods can help lower your risk for disease. Healthy food gives you energy and keeps your heart strong, your brain active, your muscles working, and your bones strong. A healthy diet includes a variety of foods from the basic food groups: grains, vegetables, fruits, milk and milk products, and meat and beans. Some people may eat more of their favorite foods from only one food group and, as a result, miss getting the nutrients they need. So, it is important to pay attention not only to what you eat but also to what you are missing from your diet. You can eat a healthy, balanced diet by making a few small changes. Follow-up care is a key part of your treatment and safety. Be sure to make and go to all appointments, and call your doctor if you are having problems. It's also a good idea to know your test results and keep a list of the medicines you take. How can you care for yourself at home? Look at what you eat  · Keep a food diary for a week or two and record everything you eat or drink. Track the number of servings you eat from each food group. · For a balanced diet every day, eat a variety of:  ? 6 or more ounce-equivalents of grains, such as cereals, breads, crackers, rice, or pasta, every day. An ounce-equivalent is 1 slice of bread, 1 cup of ready-to-eat cereal, or ½ cup of cooked rice, cooked pasta, or cooked cereal.  ? 2½ cups of vegetables, especially:  § Dark-green vegetables such as broccoli and spinach. § Orange vegetables such as carrots and sweet potatoes. § Dry beans (such as veliz and kidney beans) and peas (such as lentils). ? 2 cups of fresh, frozen, or canned fruit. A small apple or 1 banana or orange equals 1 cup. ? 3 cups of nonfat or low-fat milk, yogurt, or other milk products. ? 5½ ounces of meat and beans, such as chicken, fish, lean meat, beans, nuts, and seeds.  One egg, 1 tablespoon of peanut butter, ½ ounce nuts or seeds, or ¼ cup of cooked beans equals 1 ounce of meat. · Learn how to read food labels for serving sizes and ingredients. Fast-food and convenience-food meals often contain few or no fruits or vegetables. Make sure you eat some fruits and vegetables to make the meal more nutritious. · Look at your food diary. For each food group, add up what you have eaten and then divide the total by the number of days. This will give you an idea of how much you are eating from each food group. See if you can find some ways to change your diet to make it more healthy. Start small  · Do not try to make dramatic changes to your diet all at once. You might feel that you are missing out on your favorite foods and then be more likely to fail. · Start slowly, and gradually change your habits. Try some of the following:  ? Use whole wheat bread instead of white bread. ? Use nonfat or low-fat milk instead of whole milk. ? Eat brown rice instead of white rice, and eat whole wheat pasta instead of white-flour pasta. ? Try low-fat cheeses and low-fat yogurt. ? Add more fruits and vegetables to meals and have them for snacks. ? Add lettuce, tomato, cucumber, and onion to sandwiches. ? Add fruit to yogurt and cereal.  Enjoy food  · You can still eat your favorite foods. You just may need to eat less of them. If your favorite foods are high in fat, salt, and sugar, limit how often you eat them, but do not cut them out entirely. · Eat a wide variety of foods. Make healthy choices when eating out  · The type of restaurant you choose can help you make healthy choices. Even fast-food chains are now offering more low-fat or healthier choices on the menu. · Choose smaller portions, or take half of your meal home. · When eating out, try:  ? A veggie pizza with a whole wheat crust or grilled chicken (instead of sausage or pepperoni).   ? Pasta with roasted vegetables, grilled chicken, or marinara sauce instead of cream sauce. ? A vegetable wrap or grilled chicken wrap. ? Broiled or poached food instead of fried or breaded items. Make healthy choices easy  · Buy packaged, prewashed, ready-to-eat fresh vegetables and fruits, such as baby carrots, salad mixes, and chopped or shredded broccoli and cauliflower. · Buy packaged, presliced fruits, such as melon or pineapple. · Choose 100% fruit or vegetable juice instead of soda. Limit juice intake to 4 to 6 oz (½ to ¾ cup) a day. · Blend low-fat yogurt, fruit juice, and canned or frozen fruit to make a smoothie for breakfast or a snack. Where can you learn more? Go to http://vivi-teagan.info/. Enter T756 in the search box to learn more about \"Eating Healthy Foods: Care Instructions. \"  Current as of: March 28, 2018  Content Version: 11.9  © 8809-2399 [employer]. Care instructions adapted under license by [employer] (which disclaims liability or warranty for this information). If you have questions about a medical condition or this instruction, always ask your healthcare professional. [place] [address] any warranty or liability for your use of this information. Reactive Hypoglycemia    Reactive hypoglycemia is a medical term describing recurrent episodes of symptomatic hypoglycemia (low blood sugar) occurring 2-4 hours after eating a high carbohydrate meal.    There are two kinds of hypoglycemia, FASTING and REACTIVE. The REACTIVE type is more common and happens when the blood sugar falls too low after a meal or snack. FASTING hypoglycemia occurs when the stomach is empty and no food has been eaten for several hours. People with REACTIVE hypoglycemia still produce insulin through their pancreas. Insulin is the hormone that is needed to regulate blood glucose (blood sugar).  It's not as efficient as it should be, resulting in too much production of insulin in the blood stream, causing the blood sugar to drop too low. It's the body's inability to handle large amounts of sugar (in some cases even small amounts) that is consumed throughout the day. Some symptoms of REACTIVE hypoglycemia may be:      * SWEATING  * HUNGER   * DROWSINES  * DIFFICULTY SPEAKING   * ANXIETY   * CONFUSION   * NAUSEA   * TREMBLING   * HEADACHES  * DIZZINESS   * IRRITABILITY  * CRAVING SWEETS   * RUNNY NOSE  * DEPRESSION   * FEELING WARM OR FLUSHED   * INABILITY TO CONCENTRATE      In most patients, REACTIVE hypoglycemia is completely avoidable and can be managed with dietary changes. GLYCEMIC INDEX    The GLYCEMIC INDEX (GI) is a measure of the effects of carbohydrates on blood sugar levels. Carbohydrates that break down easily and quickly during digestion, release glucose rapidly into the bloodstream are HIGH GI FOODS. Carbohydrates that break down more slowly have a LOW GI rating. Foods that are LOW GI are high in fiber. Protein rich foods are LOW GI. Foods that are highly processes are usually HIGH GI foods. Most starchy, sugary, snack foods are HIGH GI and should be avoided. LOW GI (Good choices)  Most vegetables ok (except          potatoes, corn, peas and               carrots). Whole fresh fruit like        apples, berries, grapes,                 pineapple (Avoid dries fruit, fruit    juices, watermelon, bananas). High fiber breads). High fiber        breads (>3 grams per slice);         legumes (navy beans, veliz           beans, kidney beans, lentils,     etc); low-fat dairy products.      MEDIUM GI  Whole wheat products, sweet       potato, brown rice, plain oatmeal.       HIGH GI (AVOID)  Corn flakes, Rice Krispies, rice     cakes, popcorn, pretzels, snack    crackers, animal crackers, fig        bars, dried fruits, fruit juices,          white rice, white pastas, bagels,    breads, rolls, white/baked              potatoes, grits, muffins,                 pancakes, cookies, candies,         cakes, sugary drinks, alcohol,        any highly processed sugary        cereal, snack food or        convenience food. ** Eat 4-5 small meals daily. Make sure each mini meal has protein and fiber. Choose foods with a LOW GLYCEMIC INDEX and you can avoid symptoms of REACTIVE HYPOGLYCEMIA and feel better! TIP #1     Treatment of REACTIVE hypoglycemia is an ongoing process;         remember to have a good diet with evenly spaced meals   throughout the day. Tip #2   Daily exercise can help stabilize blood sugars and help control the   symptoms of hypoglycemia. Tip #3   Choose protein and fiber rich foods every meal. Lean meats or   seafood, low fat dairy products and colorful vegetables are great,   choices. Tip #4   If you experience a low blood sugar, drink 1/2 cup of skim milk   (fat-free). 1% milk or eat a light yogurt. Ideally, try and eat something with some protein that can be easily ingested. A High protein Slimfast, Low-Carb [place] Instant Breakfast or Boost Glucose control will also work, as will 1/2 a protein bar. Tip #5   Avoid alcohol, caffeine, sugar and highly starchy foods such as white rice, bread, crackers, popcorn, potatoes, pretzels, and other snack type foods that are highly processed with very little nutritional benefit (empty calories). Tip #6   Avoid fruit juices and dried fruits. The natural sugar content is very high and will contribute to hypoglycemic episodes.

## 2019-08-07 ENCOUNTER — OFFICE VISIT (OUTPATIENT)
Dept: SURGERY | Age: 44
End: 2019-08-07

## 2019-08-07 VITALS
HEART RATE: 81 BPM | RESPIRATION RATE: 18 BRPM | DIASTOLIC BLOOD PRESSURE: 78 MMHG | HEIGHT: 60 IN | OXYGEN SATURATION: 98 % | BODY MASS INDEX: 24.11 KG/M2 | TEMPERATURE: 98.6 F | SYSTOLIC BLOOD PRESSURE: 122 MMHG | WEIGHT: 122.8 LBS

## 2019-08-07 DIAGNOSIS — E66.01 MORBID OBESITY (HCC): Primary | ICD-10-CM

## 2019-08-07 DIAGNOSIS — Z98.84 S/P GASTRIC BYPASS: ICD-10-CM

## 2019-08-07 NOTE — PROGRESS NOTES
1. Have you been to the ER, urgent care clinic since your last visit? Hospitalized since your last visit? No    2. Have you seen or consulted any other health care providers outside of the [address] [place] [address] since your last visit? Include any pap smears or colon screening.  No

## 2019-08-07 NOTE — PROGRESS NOTES
HISTORY OF PRESENT ILLNESS  Abdiel Cisneros is a 53 y.o. female with previous Malabsorptive Gastric bypass surgery on 11 months ago. . She has lost a total of 62.2pounds since surgery. She  Has lost 2.2 lbs since the last ov. Body mass index is 23.98 kg/m². An Brian no nausea and no vomiting . Denies Acid reflux/heartburn. . Drinking  48+ ounces of water daily. 40? grams protein intake daily. + BM's. Pt is not exercising. She has labs recently done. Her iron was better but her Hgb A1c went up to 7.6%  Dietary recall -    Breakfast- cottage cheese and pears  Lunch- smart one-baked ziti with meatballs  Dinner- soft taco, jesi and cheese, kids sub, cheese burger    She is snacking between meals; blackberries, strawberries, bugles, chex mix     Vitamins:  MVI : yes  Calcium : yes  B-Vit 12: yes    Patient has an advanced directive: NO      Ms. Casper Renae has a reminder for a \"due or due soon\" health maintenance. I have asked that she contact her primary care provider for follow-up on this health maintenance. Co-Morbid(s)              COMORBIDITY     SLEEP APNEA                 NO        GERD  (req.meds)            NO  HYPERLIPIDEMIA            NO  HYPERTENSION              NO         DIABETES                         YES           Current Outpatient Medications:     metFORMIN (GLUCOPHAGE) 1,000 mg tablet, Take 1,000 mg by mouth., Disp: , Rfl:     hydrocortisone (PROCTOSOL HC) 2.5 % rectal cream, Insert  into rectum four (4) times daily. , Disp: 30 g, Rfl: 0    GLIPIZIDE PO, Take  by mouth two (2) times a day., Disp: , Rfl:     insulin lispro (HUMALOG) 100 unit/mL injection, Use as directed on sliINITIATE CORRECTIVE INSULIN PROTOCOL (MARGE): RX MARGE Normal Sensitivity (Average weight)  AC (before meals), Q6H, and Q4H CORRECTIONAL SCALE only For Blood Sugar (mg/dl) of :            140-199=2 units           200-249=3 units 250-299=5 units 300-349=7 units 350 or great, Disp: 1 Vial, Rfl: 1    senna-docusate (PERICOLACE) 8.6-50 mg per tablet, Take 1 Tab by mouth two (2) times a day., Disp: 60 Tab, Rfl: 0    ergocalciferol (ERGOCALCIFEROL) 50,000 unit capsule, Take 1 Cap by mouth every Monday and Friday., Disp: 12 Cap, Rfl: 3    rosuvastatin (CRESTOR) 20 mg tablet, Take 20 mg by mouth nightly. Patient states she takes 1/2 tablet, Disp: , Rfl:     lisinopril (PRINIVIL, ZESTRIL) 5 mg tablet, Take 2.5 mg by mouth nightly., Disp: , Rfl:     oxyCODONE IR (ROXICODONE) 5 mg immediate release tablet, Take 1 Tab by mouth every four (4) hours as needed. Max Daily Amount: 30 mg., Disp: 30 Tab, Rfl: 0      Visit Vitals  /78 (BP 1 Location: Left arm, BP Patient Position: Sitting)   Pulse 81   Temp 98.6 °F (37 °C) (Oral)   Resp 18   Ht 5' (1.524 m)   Wt 122 lb 12.8 oz (55.7 kg)   SpO2 98%   BMI 23.98 kg/m²     HPI    Review of Systems   Gastrointestinal: Negative for abdominal pain, heartburn, nausea and vomiting. Neurological: Negative for dizziness and headaches. Physical Exam   Constitutional: She is oriented to person, place, and time. She appears well-developed and well-nourished. HENT:   Head: Normocephalic. Neck: Normal range of motion. Neck supple. Cardiovascular: Normal rate and regular rhythm. Exam reveals no gallop and no friction rub. No murmur heard. Pulmonary/Chest: Effort normal and breath sounds normal.   Abdominal: Soft. Bowel sounds are normal. She exhibits no distension. There is no tenderness. No masses or hernias noted. Neurological: She is alert and oriented to person, place, and time. Skin: Skin is warm and dry. Psychiatric: She has a normal mood and affect. ASSESSMENT and PLAN  Juju President is a 53 y.o. female with previous Malabsorptive Gastric bypass surgery on 11 months ago. . She has lost a total of 62.2pounds since surgery. She  Has lost 2.2 lbs since the last ov. Body mass index is 23.98 kg/m². She is concerned about her hgb A1c. We discussed that she eats a lot of carbs throughout the day. Meal prepping and snack options were discussed as well. She needs to focus more on protein and menu planning/prepping and stop eating as much \"convience\" foods. Recommend RD if needed. Advised patient regard to diet that is high-protein, low-fat, low-sugar, limited carbohydrates. Strive for 50-60 grams of protein daily. If having a snack, foods that are protein or fiber rich. . No eating/drinking together, chew foods well, and portion control. Measure meals. Discussed snacking behavior and to [facility] pay attention to behavioral factor and habits. Drink at least 40-64 ounces of water or non-calorie/non-carbonated beverages daily. Continue vitamin regiment daily. Exercise at least 3 days a week with cardiovascular and strength training. Patient to follow up in 6 months or earlier if need be Advised to call office if any questions/concerns. Pt verbalized understanding and questions were answered to the best of my knowledge and ability.

## 2019-08-08 RX ORDER — CYANOCOBALAMIN 500 UG/1
SPRAY NASAL
Qty: 4 ML | Refills: 11 | Status: SHIPPED | OUTPATIENT
Start: 2019-08-08

## 2020-03-10 ENCOUNTER — TELEPHONE (OUTPATIENT)
Dept: SURGERY | Age: 45
End: 2020-03-10

## 2020-03-10 NOTE — TELEPHONE ENCOUNTER
Patient called stating she would like to speak with nurse regarding a medication that another doctor has prescribed her. She stated the medication is called medrol dose anahy.

## 2020-03-19 ENCOUNTER — OFFICE VISIT (OUTPATIENT)
Dept: SURGERY | Age: 45
End: 2020-03-19

## 2020-03-19 VITALS
BODY MASS INDEX: 23.95 KG/M2 | SYSTOLIC BLOOD PRESSURE: 138 MMHG | WEIGHT: 122 LBS | OXYGEN SATURATION: 98 % | HEART RATE: 78 BPM | HEIGHT: 60 IN | RESPIRATION RATE: 16 BRPM | TEMPERATURE: 98.2 F | DIASTOLIC BLOOD PRESSURE: 73 MMHG

## 2020-03-19 DIAGNOSIS — D64.9 ANEMIA, UNSPECIFIED TYPE: ICD-10-CM

## 2020-03-19 DIAGNOSIS — E53.8 VITAMIN B12 DEFICIENCY: ICD-10-CM

## 2020-03-19 DIAGNOSIS — K91.2 POSTSURGICAL NONABSORPTION: ICD-10-CM

## 2020-03-19 DIAGNOSIS — E55.9 VITAMIN D DEFICIENCY: ICD-10-CM

## 2020-03-19 DIAGNOSIS — Z98.84 S/P GASTRIC BYPASS: ICD-10-CM

## 2020-03-19 DIAGNOSIS — E66.01 MORBID OBESITY (HCC): Primary | ICD-10-CM

## 2020-03-19 NOTE — PROGRESS NOTES
HISTORY OF PRESENT ILLNESS  Angus Wheeler is a 50 y.o. female with previous Gastric bypass surgery 1 year ago. . . She has lost a total of 122 lbs  pounds since surgery. She  Has lost 0 lbs since the last ov. Body mass index is 23.83 kg/m². Shayla Phuc no nausea and no vomiting . Denies Acid reflux/heartburn. . Drinking  48+ ounces of water daily. 40 ? protein intake daily.  + BM's. Pt is not exercising. She complains of pain at her Tailbone. She has been seen by a orthopedic that wanted to start her on a Medrol dose pack but she wanted to make sure with us before she started it. Dietary recall -    Breakfast- vegetable fritta  Lunch-tortilla soup  Dinner- hot dog and chili    She is snacking between meals; chela hebert . Vitamins:  MVI : yes  Calcium : yes  B-Vit 12: yes    Patient has an advanced directive: not on file. Ms. Delmar Jay has a reminder for a \"due or due soon\" health maintenance. I have asked that she contact her primary care provider for follow-up on this health maintenance. Co-Morbid(s)           COMORBIDITY     SLEEP APNEA                 NO        GERD  (req.meds)            NO  HYPERLIPIDEMIA            NO  HYPERTENSION              NO         DIABETES                         YES           Current Outpatient Medications:     NASCOBAL 500 mcg/spray spry, USE ONE SPRAY IN ONE NOSTRIL ONCE A WEEK AS DIRECTED, Disp: 4 mL, Rfl: 11    metFORMIN (GLUCOPHAGE) 1,000 mg tablet, Take 1,000 mg by mouth., Disp: , Rfl:     hydrocortisone (PROCTOSOL HC) 2.5 % rectal cream, Insert  into rectum four (4) times daily. , Disp: 30 g, Rfl: 0    GLIPIZIDE PO, Take  by mouth two (2) times a day., Disp: , Rfl:     insulin lispro (HUMALOG) 100 unit/mL injection, Use as directed on sliINITIATE CORRECTIVE INSULIN PROTOCOL (MARGE): RX MARGE Normal Sensitivity (Average weight)  AC (before meals), Q6H, and Q4H CORRECTIONAL SCALE only For Blood Sugar (mg/dl) of :            140-199=2 units           200-249=3 units 250-299=5 units 300-349=7 units 350 or great, Disp: 1 Vial, Rfl: 1    senna-docusate (PERICOLACE) 8.6-50 mg per tablet, Take 1 Tab by mouth two (2) times a day., Disp: 60 Tab, Rfl: 0    oxyCODONE IR (ROXICODONE) 5 mg immediate release tablet, Take 1 Tab by mouth every four (4) hours as needed. Max Daily Amount: 30 mg., Disp: 30 Tab, Rfl: 0    ergocalciferol (ERGOCALCIFEROL) 50,000 unit capsule, Take 1 Cap by mouth every Monday and Friday., Disp: 12 Cap, Rfl: 3    rosuvastatin (CRESTOR) 20 mg tablet, Take 20 mg by mouth nightly. Patient states she takes 1/2 tablet, Disp: , Rfl:     lisinopril (PRINIVIL, ZESTRIL) 5 mg tablet, Take 2.5 mg by mouth nightly., Disp: , Rfl:       Visit Vitals  /73 (BP 1 Location: Left arm, BP Patient Position: Sitting)   Pulse 78   Temp 98.2 °F (36.8 °C) (Oral)   Resp 16   Ht 5' (1.524 m)   Wt 122 lb (55.3 kg)   LMP 03/06/2020   SpO2 98%   BMI 23.83 kg/m²     HPI    Review of Systems   Respiratory: Negative for shortness of breath. Cardiovascular: Negative for chest pain. Gastrointestinal: Negative for abdominal pain, heartburn, nausea and vomiting. Neurological: Negative for dizziness and headaches. Physical Exam  Constitutional:       Appearance: She is well-developed. Neck:      Musculoskeletal: Normal range of motion and neck supple. Cardiovascular:      Heart sounds: No murmur. No friction rub. No gallop. Pulmonary:      Effort: Pulmonary effort is normal.      Breath sounds: Normal breath sounds. Abdominal:      General: Bowel sounds are normal. There is no distension. Palpations: Abdomen is soft. Tenderness: There is no abdominal tenderness. Comments: No masses or hernias noted. Skin:     General: Skin is warm and dry. Neurological:      Mental Status: She is alert and oriented to person, place, and time. ASSESSMENT and PLAN  Anny Morrison is a 50 y.o. female with previous Gastric bypass surgery 1 year ago.  . . She has lost a total of 122 lbs pounds since surgery. She  Has lost 0 lbs since the last ov. Body mass index is 23.83 kg/m². Rian Horowitzos no nausea and no vomiting . Denies Acid reflux/heartburn. . Drinking  48+ ounces of water daily. 40 ? protein intake daily.  + BM's. Pt is not exercising. She complains of pain at her Tailbone. She has been seen by a orthopedic that wanted to start her on a Medrol dose pack but she wanted to make sure with us before she started it. She may take the Medrol dose pack. Take a PPI while taking it. We dicussed starting Different vitamins. She was taking Nascobal that provided her vitamins. Advised patient regard to diet that is high-protein, low-fat, low-sugar, limited carbohydrates. Strive for 50-60 grams of protein daily. If having a snack, foods that are protein or fiber rich. . No eating/drinking together, chew foods well, and portion control. Measure meals. Discussed snacking behavior and to [facility] pay attention to behavioral factor and habits. Drink at least 40-64 ounces of water or non-calorie/non-carbonated beverages daily. Continue vitamin regiment daily. Exercise at least 3 days a week with cardiovascular and strength training. Patient to follow up In 6 months. Advised to call office if any questions/concerns. 40 Minutes spent face to face with patient, >50 % of time spent counseling.

## 2020-03-19 NOTE — PATIENT INSTRUCTIONS
Eating Healthy Foods: Care Instructions  Your Care Instructions    Eating healthy foods can help lower your risk for disease. Healthy food gives you energy and keeps your heart strong, your brain active, your muscles working, and your bones strong. A healthy diet includes a variety of foods from the basic food groups: grains, vegetables, fruits, milk and milk products, and meat and beans. Some people may eat more of their favorite foods from only one food group and, as a result, miss getting the nutrients they need. So, it is important to pay attention not only to what you eat but also to what you are missing from your diet. You can eat a healthy, balanced diet by making a few small changes. Follow-up care is a key part of your treatment and safety. Be sure to make and go to all appointments, and call your doctor if you are having problems. It's also a good idea to know your test results and keep a list of the medicines you take. How can you care for yourself at home? Look at what you eat  · Keep a food diary for a week or two and record everything you eat or drink. Track the number of servings you eat from each food group. · For a balanced diet every day, eat a variety of:  ? 6 or more ounce-equivalents of grains, such as cereals, breads, crackers, rice, or pasta, every day. An ounce-equivalent is 1 slice of bread, 1 cup of ready-to-eat cereal, or ½ cup of cooked rice, cooked pasta, or cooked cereal.  ? 2½ cups of vegetables, especially:  § Dark-green vegetables such as broccoli and spinach. § Orange vegetables such as carrots and sweet potatoes. § Dry beans (such as veliz and kidney beans) and peas (such as lentils). ? 2 cups of fresh, frozen, or canned fruit. A small apple or 1 banana or orange equals 1 cup. ? 3 cups of nonfat or low-fat milk, yogurt, or other milk products. ? 5½ ounces of meat and beans, such as chicken, fish, lean meat, beans, nuts, and seeds.  One egg, 1 tablespoon of peanut butter, ½ ounce nuts or seeds, or ¼ cup of cooked beans equals 1 ounce of meat. · Learn how to read food labels for serving sizes and ingredients. Fast-food and convenience-food meals often contain few or no fruits or vegetables. Make sure you eat some fruits and vegetables to make the meal more nutritious. · Look at your food diary. For each food group, add up what you have eaten and then divide the total by the number of days. This will give you an idea of how much you are eating from each food group. See if you can find some ways to change your diet to make it more healthy. Start small  · Do not try to make dramatic changes to your diet all at once. You might feel that you are missing out on your favorite foods and then be more likely to fail. · Start slowly, and gradually change your habits. Try some of the following:  ? Use whole wheat bread instead of white bread. ? Use nonfat or low-fat milk instead of whole milk. ? Eat brown rice instead of white rice, and eat whole wheat pasta instead of white-flour pasta. ? Try low-fat cheeses and low-fat yogurt. ? Add more fruits and vegetables to meals and have them for snacks. ? Add lettuce, tomato, cucumber, and onion to sandwiches. ? Add fruit to yogurt and cereal.  Enjoy food  · You can still eat your favorite foods. You just may need to eat less of them. If your favorite foods are high in fat, salt, and sugar, limit how often you eat them, but do not cut them out entirely. · Eat a wide variety of foods. Make healthy choices when eating out  · The type of restaurant you choose can help you make healthy choices. Even fast-food chains are now offering more low-fat or healthier choices on the menu. · Choose smaller portions, or take half of your meal home. · When eating out, try:  ? A veggie pizza with a whole wheat crust or grilled chicken (instead of sausage or pepperoni).   ? Pasta with roasted vegetables, grilled chicken, or marinara sauce instead of cream sauce. ? A vegetable wrap or grilled chicken wrap. ? Broiled or poached food instead of fried or breaded items. Make healthy choices easy  · Buy packaged, prewashed, ready-to-eat fresh vegetables and fruits, such as baby carrots, salad mixes, and chopped or shredded broccoli and cauliflower. · Buy packaged, presliced fruits, such as melon or pineapple. · Choose 100% fruit or vegetable juice instead of soda. Limit juice intake to 4 to 6 oz (½ to ¾ cup) a day. · Blend low-fat yogurt, fruit juice, and canned or frozen fruit to make a smoothie for breakfast or a snack. Where can you learn more? Go to http://vivi-teagan.info/  Enter T756 in the search box to learn more about \"Eating Healthy Foods: Care Instructions. \"  Current as of: August 21, 2019Content Version: 12.4  © 7222-2141 Healthwise, [employer]. Care instructions adapted under license by [employer] (which disclaims liability or warranty for this information). If you have questions about a medical condition or this instruction, always ask your healthcare professional. [place] [address] any warranty or liability for your use of this information.

## 2020-03-19 NOTE — PROGRESS NOTES
1. Have you been to the ER, urgent care clinic since your last visit? Hospitalized since your last visit? No    2. Have you seen or consulted any other health care providers outside of the [address] [place] [address] since your last visit? Include any pap smears or colon screening.  No

## 2021-02-19 ENCOUNTER — OFFICE VISIT (OUTPATIENT)
Dept: NEUROLOGY | Age: 46
End: 2021-02-19
Payer: COMMERCIAL

## 2021-02-19 VITALS
RESPIRATION RATE: 16 BRPM | HEIGHT: 60 IN | OXYGEN SATURATION: 98 % | WEIGHT: 123 LBS | DIASTOLIC BLOOD PRESSURE: 60 MMHG | HEART RATE: 70 BPM | SYSTOLIC BLOOD PRESSURE: 120 MMHG | TEMPERATURE: 97.8 F | BODY MASS INDEX: 24.15 KG/M2

## 2021-02-19 DIAGNOSIS — M53.3 TAIL BONE PAIN: Primary | ICD-10-CM

## 2021-02-19 PROCEDURE — 99204 OFFICE O/P NEW MOD 45 MIN: CPT | Performed by: PSYCHIATRY & NEUROLOGY

## 2021-02-19 RX ORDER — GABAPENTIN 300 MG/1
300 CAPSULE ORAL AS NEEDED
COMMUNITY
Start: 2021-01-05 | End: 2021-02-19 | Stop reason: SDUPTHER

## 2021-02-19 RX ORDER — GLIPIZIDE 5 MG/1
TABLET ORAL
COMMUNITY
Start: 2021-01-09

## 2021-02-19 RX ORDER — MULTIVITAMIN
CAPSULE ORAL
COMMUNITY

## 2021-02-19 RX ORDER — METFORMIN HYDROCHLORIDE 500 MG/1
TABLET ORAL
COMMUNITY
Start: 2020-12-01

## 2021-02-19 RX ORDER — GABAPENTIN 300 MG/1
300 CAPSULE ORAL
Qty: 30 CAP | Refills: 3 | Status: SHIPPED | OUTPATIENT
Start: 2021-02-19 | End: 2021-06-16

## 2021-02-19 NOTE — PROGRESS NOTES
NEUROLOGY NEW PATIENT OFFICE CONSULTATION      2/19/2021    RE: Argelia Stoll         1975      REFERRED BY:  JOSUE Tomas        CHIEF COMPLAINT:  This is Argelia Stoll is a 50 y.o. female right handed works 3 days a week (sitting all day) who had concerns including New Patient (C/o tailbone pain for yrs.). HPI:     In 2019, patient had lost 75 lbs with noted of pain in the tail bone when she sits 2/10, non-localized. Okay when standing and laying down. Taking Gabapentin with benefit. Donut sit helps. Patient saw sports medicine physician and pelvic floor therapy with no benefit. Saw orthopedic with no treatment.     (+) 2005 -  Broke pelvis after delivering 8 lbs baby. Previous tests done:  MRI pelvis (12/16/20): intraosseous ganglia lower most sacral segments  X-ray Tail bone (2/27/20): No evidence of fracture. 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 Xray of sacrum and coccyx (11/4/2020): minor sclerosis SI joints  X-ray bilateral hips (11/6/20): WNL      ROS   (-) fever  (-) rash  All other systems reviewed and are negative    Past Medical Hx  Past Medical History:   Diagnosis Date    Chronic pain     ULISSES. HIPS    Diabetes ([address]     IDDM    History of esophageal dilatation 2008    Tuberculosis 2015    H/O POS. PPD - [place] [facility].  ANTIBX THERAPY   Gastric bypass _ Aug 2018  Tummy tuck and breast implant    Social Hx  Social History     Socioeconomic History    Marital status:      Spouse name: Not on file    Number of children: Not on file    Years of education: Not on file    Highest education level: Not on file   Tobacco Use    Smoking status: Never Smoker    Smokeless tobacco: Never Used   Substance and Sexual Activity    Alcohol use: Yes     Comment: 2 DRINKS PER MONTH    Drug use: No    Sexual activity: Yes     Birth control/protection: Surgical       Family Hx  Family History   Problem Relation Age of Onset    Cancer Mother         breast    Diabetes Father     Heart Disease Maternal Grandmother     Heart Disease Paternal Grandfather     Anesth Problems Neg Hx        ALLERGIES  No Known Allergies    CURRENT MEDS  Current Outpatient Medications   Medication Sig Dispense Refill    glipiZIDE (GLUCOTROL) 5 mg tablet TAKE 1 TABLET BY MOUTH ONCE DAILY WITH SUPPER      metFORMIN (GLUCOPHAGE) 500 mg tablet TAKE 2 TABLETS BY MOUTH WITH SUPPER      multivitamin capsule 1 tab      Ferrous Sulfate 27 mg iron tab Take  by mouth daily.  gabapentin (NEURONTIN) 300 mg capsule Take 1 Cap by mouth nightly. Max Daily Amount: 300 mg. 30 Cap 3    ergocalciferol (ERGOCALCIFEROL) 50,000 unit capsule Take 1 Cap by mouth every Monday and Friday. 12 Cap 3    rosuvastatin (CRESTOR) 20 mg tablet Take 5 mg by mouth nightly. Patient states she takes 1/2 tablet      NASCOBAL 500 mcg/spray spry USE ONE SPRAY IN ONE NOSTRIL ONCE A WEEK AS DIRECTED 4 mL 11    metFORMIN (GLUCOPHAGE) 1,000 mg tablet Take 1,000 mg by mouth.  hydrocortisone (PROCTOSOL HC) 2.5 % rectal cream Insert  into rectum four (4) times daily. 30 g 0    GLIPIZIDE PO Take  by mouth two (2) times a day.  insulin lispro (HUMALOG) 100 unit/mL injection Use as directed on sliINITIATE CORRECTIVE INSULIN PROTOCOL (MARGE):  RX MARGE Normal Sensitivity (Average weight)    AC (before meals), Q6H, and Q4H CORRECTIONAL SCALE only For Blood Sugar (mg/dl) of :             140-199=2 units            200-249=3 units  250-299=5 units  300-349=7 units  350 or great 1 Vial 1    senna-docusate (PERICOLACE) 8.6-50 mg per tablet Take 1 Tab by mouth two (2) times a day. 60 Tab 0    oxyCODONE IR (ROXICODONE) 5 mg immediate release tablet Take 1 Tab by mouth every four (4) hours as needed. Max Daily Amount: 30 mg. 30 Tab 0    lisinopril (PRINIVIL, ZESTRIL) 5 mg tablet Take 2.5 mg by mouth nightly.              PREVIOUS WORKUP: (reviewed)  IMAGING:    CT Results (recent):  Results from Hospital Encounter encounter on 10/20/18 CTA CHEST W OR W WO CONT    Narrative CLINICAL HISTORY: Chest pain, shortness of breath    INDICATION: Chest pain, shortness of breath    COMPARISON: 10/20/2018    TECHNIQUE: CT of the chest with  IV contrast , Isovue-370 is performed. Axial  images from the thoracic inlet to the level of the upper abdomen are obtained. Manual post-processing of the images and coronal reformatting is also performed. CT dose reduction was achieved through use of a standardized protocol tailored  for this examination and automatic exposure control for dose modulation. Multiplanar reformatted imaging was performed. Sagittal and coronal reformatting. 3-D Postprocessing of imaging was performed. 3-D MIP reconstructed images were obtained in the coronal plane. FINDINGS:   There is no pulmonary embolism. There is no aortic aneurysm or dissection. Hepatic steatosis. Post bypass. Small hiatal hernia with distal esophageal wall  thickening. . There is no pleural or pericardial effusion. There is no  mediastinal, axillary or hilar lymphadenopathy. The aorta is normal in course  and caliber. The proximal pulmonary arteries are without evidence of filling  defects. No lytic or blastic lesions are identified. The remainder of the upper  abdomen visualized is unremarkable. Impression IMPRESSION:   There is no pulmonary embolism. There is no aortic aneurysm or dissection. No acute intrathoracic process. Post gastric bypass. Hepatic steatosis is mild. MRI Results (recent):  No results found for this or any previous visit. IR Results (recent):  No results found for this or any previous visit. VAS/US Results (recent):  No results found for this or any previous visit.         LABS (reviewed)  Results for orders placed or performed during the hospital encounter of 12/09/18   CBC WITH AUTOMATED DIFF   Result Value Ref Range    WBC 9.6 3.6 - 11.0 K/uL    RBC 5.09 3.80 - 5.20 M/uL    HGB 13.4 11.5 - 16.0 g/dL    HCT 41.1 35.0 - 47.0 %    MCV 80.7 80.0 - 99.0 FL    MCH 26.3 26.0 - 34.0 PG    MCHC 32.6 30.0 - 36.5 g/dL    RDW 14.1 11.5 - 14.5 %    PLATELET 698 764 - 233 K/uL    MPV 8.7 (L) 8.9 - 12.9 FL    NRBC 0.0 0  WBC    ABSOLUTE NRBC 0.00 0.00 - 0.01 K/uL    NEUTROPHILS 66 32 - 75 %    LYMPHOCYTES 26 12 - 49 %    MONOCYTES 5 5 - 13 %    EOSINOPHILS 2 0 - 7 %    BASOPHILS 1 0 - 1 %    IMMATURE GRANULOCYTES 0 0.0 - 0.5 %    ABS. NEUTROPHILS 6.4 1.8 - 8.0 K/UL    ABS. LYMPHOCYTES 2.5 0.8 - 3.5 K/UL    ABS. MONOCYTES 0.5 0.0 - 1.0 K/UL    ABS. EOSINOPHILS 0.1 0.0 - 0.4 K/UL    ABS. BASOPHILS 0.1 0.0 - 0.1 K/UL    ABS. IMM. GRANS. 0.0 0.00 - 0.04 K/UL    DF AUTOMATED     METABOLIC PANEL, COMPREHENSIVE   Result Value Ref Range    Sodium 143 136 - 145 mmol/L    Potassium 4.1 3.5 - 5.1 mmol/L    Chloride 105 97 - 108 mmol/L    CO2 24 21 - 32 mmol/L    Anion gap 14 5 - 15 mmol/L    Glucose 202 (H) 65 - 100 mg/dL    BUN 11 6 - 20 MG/DL    Creatinine 0.58 0.55 - 1.02 MG/DL    BUN/Creatinine ratio 19 12 - 20      GFR est AA >60 >60 ml/min/1.73m2    GFR est non-AA >60 >60 ml/min/1.73m2    Calcium 9.7 8.5 - 10.1 MG/DL    Bilirubin, total 0.4 0.2 - 1.0 MG/DL    ALT (SGPT) 40 12 - 78 U/L    AST (SGOT) 21 15 - 37 U/L    Alk.  phosphatase 89 45 - 117 U/L    Protein, total 8.2 6.4 - 8.2 g/dL    Albumin 4.0 3.5 - 5.0 g/dL    Globulin 4.2 (H) 2.0 - 4.0 g/dL    A-G Ratio 1.0 (L) 1.1 - 2.2     LIPASE   Result Value Ref Range    Lipase 127 73 - 393 U/L   VENOUS BLOOD GAS   Result Value Ref Range    VENOUS PH 7.40 7.32 - 7.42      VENOUS PCO2 39 (L) 41 - 51 mmHg    VENOUS PO2 26 25 - 40 mmHg    VENOUS O2 SATURATION 49 (L) 65 - 88 %    VENOUS BICARBONATE 24 23 - 28 mmol/L    VENOUS BASE DEFICIT 0.9 mmol/L    O2 METHOD ROOM AIR      Sample source VENOUS      SITE OTHER     SAMPLES BEING HELD   Result Value Ref Range    SAMPLES BEING HELD sst     COMMENT        Add-on orders for these samples will be processed based on acceptable specimen integrity and analyte stability, which may vary by analyte. GLUCOSE, POC   Result Value Ref Range    Glucose (POC) 185 (H) 65 - 100 mg/dL    Performed by Zain Jasmine (PCT)    POC [facility]   Result Value Ref Range    Calcium, ionized (POC) 1.23 1.12 - 1.32 mmol/L    Sodium (POC) 143 136 - 145 mmol/L    Potassium (POC) 4.1 3.5 - 5.1 mmol/L    Chloride (POC) 104 98 - 107 mmol/L    CO2 (POC) 24 21 - 32 mmol/L    Anion gap (POC) 20 10 - 20 mmol/L    Glucose (POC) 202 (H) 65 - 100 mg/dL    BUN (POC) 10 9 - 20 mg/dL    Creatinine (POC) 0.3 (L) 0.6 - 1.3 mg/dL    GFRAA, POC >60 >60 ml/min/1.73m2    GFRNA, POC >60 >60 ml/min/1.73m2    Hematocrit (POC) 40 35.0 - 47.0 %    Comment Comment Not Indicated. POC LACTIC ACID   Result Value Ref Range    Lactic Acid (POC) 0.67 0.40 - 2.00 mmol/L       Physical Exam:     Visit Vitals  /60 (BP 1 Location: Right arm, BP Patient Position: Sitting, BP Cuff Size: Adult)   Pulse 70   Temp 97.8 °F (36.6 °C) (Temporal)   Resp 16   Ht 5' (1.524 m)   Wt 55.8 kg (123 lb)   SpO2 98%   BMI 24.02 kg/m²     General:  Alert, cooperative, no distress. Head:  Normocephalic, without obvious abnormality, atraumatic. Eyes:  Conjunctivae/corneas clear. Lungs:  Heart:   Non labored breathing  Regular rate and rhythm, no carotid bruits   Abdomen:   Soft, non-distended   Extremities: Extremities normal, atraumatic, no cyanosis or edema. Pulses: 2+ and symmetric all extremities. Skin: Skin color, texture, turgor normal. No rashes or lesions.   Neurologic Exam     Gen: Attention normal             Language: naming, repetition, fluency normal             Memory: intact recent and remote memory  Cranial Nerves:  I: smell Not tested   II: visual fields Full to confrontation   II: pupils Equal, round, reactive to light   II: optic disc No papilledema   III,VII: ptosis none   III,IV,VI: extraocular muscles  Full ROM   V: mastication normal   V: facial light touch sensation  normal   VII: facial muscle function   symmetric   VIII: hearing symmetric   IX: soft palate elevation  normal   XI: trapezius strength  5/5   XI: sternocleidomastoid strength 5/5   XI: neck flexion strength  5/5   XII: tongue  midline     Motor: normal bulk and tone, no tremor              Strength: 5/5 all four extremities  (+) mild tenderness coccyx  Sensory: intact to LT, PP, vibration, and JPS  Reflexes: 2+ throughout; Down going toes  Coordination: Good FTN and HTS  Gait: normal gait including tandem            Impression:     Rosario Siegel is a 50 y.o. female who  has a past medical history of Chronic pain, Diabetes ([facility] [address] History of esophageal dilatation (2008), and Tuberculosis (2015). who in 2005 may have broken her pelvis after delivering 8 lbs baby. In 2019, patient had lost 75 lbs after gastric bypass surgery and since then noted pain in the tail bone when she sits 2/10, non-localized. Okay when standing and laying down. Taking Gabapentin with benefit. Sitting on a donut shape pillow helps. Patient saw sports medicine physician and pelvic floor therapy with no benefit. Saw orthopedic with no treatment. MRI pelvis (12/16/20): intraosseous ganglia lower most sacral segments. X-ray Tail bone (2/27/20): No evidence of fracture. 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 Consideration includes chronic coccydynia (due to prior child birth, prolonged sitting and significant weight loss)    RECOMMENDATIONS  1. I had a long discussion with patient. Discussed diagnosis, prognosis, pathophysiology and available treatment. Reviewed test results. All questions were answered. 2. Reviewed medical records, X-rays and MRI pelvis from outside  3. Will increase Gabapentin to 300 mg QHS. Can ask PCP to take over prescription if this helps. 4. Advise avoid pressure on coccyx and to sit on a doughnut-shaped pillow or wedge (V-shaped) cushion  5. Can apply heat or ice to the affected area  6.  Will give information of pain management in the area for possible Coccygeal nerve block if still no relief despite above  7. Discussed surgical option (last recourse):  partial or total coccygectomy (removal of coccyx)      I spent total of 60 mins with the patient, greater than 50% of the visit was spent on providing counseling and coordination of care. Follow-up and Dispositions    · Return if symptoms worsen or fail to improve.             Thank you for the consultation      Oscar Arambula MD  Diplomate, American Board of Psychiatry and Neurology  Diplomate, Neuromuscular Medicine  Diplomate, American Board of Electrodiagnostic Medicine        CC: [facility] [place]  Fax: [phone]

## 2021-02-19 NOTE — LETTER
2/19/2021 Patient: Jody Berry YOB: 1975 Date of Visit: 2/19/2021 JOSUE Fowler 
[address] [place] [address]. [phone] [address] Fax: [phone] Dear JOSUE Fowler, Thank you for referring Ms. Jody Berry to [facility] for evaluation. My notes for this consultation are attached. If you have questions, please do not hesitate to call me. I look forward to following your patient along with you. Sincerely, Bethany Matias MD

## 2021-06-16 ENCOUNTER — TELEPHONE (OUTPATIENT)
Dept: SURGERY | Age: 46
End: 2021-06-16

## 2022-03-19 PROBLEM — E87.29 HIGH ANION GAP METABOLIC ACIDOSIS: Status: ACTIVE | Noted: 2018-10-20

## 2022-03-19 PROBLEM — E66.9 OBESITY: Status: ACTIVE | Noted: 2018-08-15

## 2022-03-19 PROBLEM — R81 GLUCOSURIA: Status: ACTIVE | Noted: 2018-10-20

## 2022-03-20 PROBLEM — D72.829 LEUKOCYTOSIS: Status: ACTIVE | Noted: 2018-10-20

## 2022-03-20 PROBLEM — R79.89: Status: ACTIVE | Noted: 2018-10-20

## 2022-03-20 PROBLEM — Z98.84 S/P GASTRIC BYPASS: Status: ACTIVE | Noted: 2018-10-20

## 2022-06-14 ENCOUNTER — TELEPHONE (OUTPATIENT)
Dept: SURGERY | Age: 47
End: 2022-06-14

## 2022-11-10 ENCOUNTER — HOSPITAL ENCOUNTER (OUTPATIENT)
Dept: GENERAL RADIOLOGY | Age: 47
Discharge: HOME | End: 2022-11-10
Payer: COMMERCIAL

## 2022-11-10 ENCOUNTER — TRANSCRIBE ORDER (OUTPATIENT)
Dept: GENERAL RADIOLOGY | Age: 47
End: 2022-11-10

## 2022-11-10 DIAGNOSIS — R76.11 POSITIVE PPD: Primary | ICD-10-CM

## 2022-11-10 DIAGNOSIS — R76.11 POSITIVE PPD: ICD-10-CM

## 2022-11-10 PROCEDURE — 71045 X-RAY EXAM CHEST 1 VIEW: CPT

## 2023-05-18 RX ORDER — OXYCODONE HYDROCHLORIDE 5 MG/1
5 TABLET ORAL EVERY 4 HOURS PRN
COMMUNITY
Start: 2018-08-16

## 2023-05-18 RX ORDER — GABAPENTIN 300 MG/1
CAPSULE ORAL
COMMUNITY
Start: 2021-06-16

## 2023-05-18 RX ORDER — ERGOCALCIFEROL 1.25 MG/1
50000 CAPSULE ORAL
COMMUNITY
Start: 2018-07-20

## 2023-05-18 RX ORDER — ROSUVASTATIN CALCIUM 20 MG/1
5 TABLET, COATED ORAL
COMMUNITY

## 2023-05-18 RX ORDER — LISINOPRIL 5 MG/1
2.5 TABLET ORAL
COMMUNITY

## 2023-05-18 RX ORDER — AMOXICILLIN 250 MG
1 CAPSULE ORAL 2 TIMES DAILY
COMMUNITY
Start: 2018-10-22

## 2023-05-18 RX ORDER — GLIPIZIDE 5 MG/1
TABLET ORAL
COMMUNITY
Start: 2021-01-09

## 2023-05-18 RX ORDER — INSULIN LISPRO 100 [IU]/ML
INJECTION, SOLUTION INTRAVENOUS; SUBCUTANEOUS
COMMUNITY
Start: 2018-10-22

## 2023-05-18 RX ORDER — CYANOCOBALAMIN 500 UG/1
SPRAY NASAL
COMMUNITY
Start: 2019-08-08

## 2024-02-09 ENCOUNTER — HOSPITAL ENCOUNTER (OUTPATIENT)
Facility: HOSPITAL | Age: 49
Discharge: HOME | End: 2024-02-12
Payer: COMMERCIAL

## 2024-02-09 ENCOUNTER — TRANSCRIBE ORDERS (OUTPATIENT)
Facility: HOSPITAL | Age: 49
End: 2024-02-09

## 2024-02-09 DIAGNOSIS — Z01.818 PRE-OP TESTING: ICD-10-CM

## 2024-02-09 DIAGNOSIS — Z01.818 PRE-OP TESTING: Primary | ICD-10-CM

## 2024-02-09 PROCEDURE — 93005 ELECTROCARDIOGRAM TRACING: CPT

## 2024-02-11 LAB
EKG ATRIAL RATE: 62 BPM
EKG DIAGNOSIS: NORMAL
EKG P AXIS: 15 DEGREES
EKG P-R INTERVAL: 152 MS
EKG Q-T INTERVAL: 412 MS
EKG QRS DURATION: 80 MS
EKG QTC CALCULATION (BAZETT): 418 MS
EKG R AXIS: 20 DEGREES
EKG T AXIS: -3 DEGREES
EKG VENTRICULAR RATE: 62 BPM

## 2024-02-11 PROCEDURE — 93010 ELECTROCARDIOGRAM REPORT: CPT | Performed by: SPECIALIST

## 2024-05-25 NOTE — ED NOTES
Bedside and Verbal shift change report given to Nury  (oncoming nurse) by Arsen Carlson (offgoing nurse). Report included the following information SBAR, ED Summary, Intake/Output, MAR, Recent Results and Cardiac Rhythm sinus tach. show

## 2024-08-05 NOTE — TELEPHONE ENCOUNTER
Advised patient per NP Ladan Auguste that she can take medrol dose pack and use Prilosec along with it to help decrease any adverse affects of steroid use s/p sleeve gastrectomy. Patient acknowledged understanding. Stress test and echo ordered to evaluate for structural heart disease. Encouraged smoking cessation, improved BP control.

## (undated) DEVICE — BNDG ADHESIVE FABRIC 2X3.5IN -- CONVERT TO ITEM 357955

## (undated) DEVICE — 3000CC GUARDIAN II: Brand: GUARDIAN

## (undated) DEVICE — SUTURE SZ 0 27IN 5/8 CIR UR-6  TAPER PT VIOLET ABSRB VICRYL J603H

## (undated) DEVICE — INFECTION CONTROL KIT SYS

## (undated) DEVICE — SUT SLK 2-0SH 30IN BLK --

## (undated) DEVICE — RELOAD STPL M SZ 2 THK30MM PUR THCK TRI STPL

## (undated) DEVICE — REINFORCED INTELLIGENT RELOAD, FOR USE WITH SIGNIA STAPLING SYSTEM: Brand: TRI-STAPLE 2.0

## (undated) DEVICE — E-Z CLEAN, PTFE COATED, ELECTROSURGICAL LAPAROSCOPIC ELECTRODE, L-HOOK, 33 CM., SINGLE-USE, FOR USE WITH HAND CONTROL PENCIL: Brand: MEGADYNE

## (undated) DEVICE — POWER SHELL: Brand: SIGNIA

## (undated) DEVICE — DEVON™ KNEE AND BODY STRAP 60" X 3" (1.5 M X 7.6 CM): Brand: DEVON

## (undated) DEVICE — STERILE POLYISOPRENE POWDER-FREE SURGICAL GLOVES WITH EMOLLIENT COATING: Brand: PROTEXIS

## (undated) DEVICE — FILTER SMK EVAC FLO CLR MEGADYNE

## (undated) DEVICE — APPLICATOR BNDG 1MM ADH PREMIERPRO EXOFIN

## (undated) DEVICE — Device

## (undated) DEVICE — CLICKLINE SCISSORS INSERT: Brand: CLICKLINE

## (undated) DEVICE — BLADELESS OPTICAL TROCAR WITH FIXATION CANNULA: Brand: VERSAONE

## (undated) DEVICE — 34" SINGLE PATIENT USE HOVERMATT BREATHABLE: Brand: SINGLE PATIENT USE HOVERMATT

## (undated) DEVICE — KENDALL SCD EXPRESS SLEEVES, KNEE LENGTH, MEDIUM: Brand: KENDALL SCD

## (undated) DEVICE — TOWEL SURG W17XL27IN STD BLU COT NONFENESTRATED PREWASHED

## (undated) DEVICE — SHEAR HARMONIC 5MMX45CM -- ACE 7+

## (undated) DEVICE — ENDO CARRY-ON PROCEDURE KIT INCLUDES ENZYMATIC SPONGE, GAUZE, BIOHAZARD LABEL, TRAY, LUBRICANT, DIRTY SCOPE LABEL, WATER LABEL, TRAY, DRAWSTRING PAD, AND DEFENDO 4-PIECE KIT.: Brand: ENDO CARRY-ON PROCEDURE KIT

## (undated) DEVICE — TROCAR SITE CLOSURE DEVICE: Brand: ENDO CLOSE

## (undated) DEVICE — STERILE POLYISOPRENE POWDER-FREE SURGICAL GLOVES: Brand: PROTEXIS

## (undated) DEVICE — SUT ETHBND 0 36IN SH DA GRN --

## (undated) DEVICE — SUT SLK 0 30IN SH BLK --

## (undated) DEVICE — BLADELESS TROCAR WITH FIXATION CANNULA: Brand: VERSAPORT PLUS

## (undated) DEVICE — ARTICULATING RELOAD WITH TRI-STAPLE TECHNOLOGY: Brand: ENDO GIA

## (undated) DEVICE — TUBING INSUFLTN 10FT LUER -- CONVERT TO ITEM 368568

## (undated) DEVICE — GOWN,SIRUS,FABRNF,XL,20/CS: Brand: MEDLINE

## (undated) DEVICE — BLADELESS OPTICAL TROCAR WITH FIXATION CANNULA: Brand: VERSAPORT

## (undated) DEVICE — VISUALIZATION SYSTEM: Brand: CLEARIFY

## (undated) DEVICE — SUT ETHLN 2-0 18IN FS BLK --

## (undated) DEVICE — SUTURE MCRYL SZ 4-0 L27IN ABSRB UD L19MM PS-2 1/2 CIR PRIM Y426H

## (undated) DEVICE — DRAIN CHN 19FR L0.25MM DIA6.3MM SIL RND HUBLESS FULL FLUT

## (undated) DEVICE — SOLUTION IRRIG 1000ML H2O STRL BLT

## (undated) DEVICE — ELECTRODE ES 36CM LAP FLAT L HK COAT DISP CLEANCOAT

## (undated) DEVICE — NEEDLE HYPO 22GA L1.5IN BLK S STL HUB POLYPR SHLD REG BVL

## (undated) DEVICE — SURGICAL PROCEDURE KIT GEN LAPAROSCOPY LF

## (undated) DEVICE — SOLUTION IV 1000ML 0.9% SOD CHL

## (undated) DEVICE — SUTURE VCRL SZ 3-0 L27IN ABSRB UD L26MM SH 1/2 CIR J416H

## (undated) DEVICE — REM POLYHESIVE ADULT PATIENT RETURN ELECTRODE: Brand: VALLEYLAB

## (undated) DEVICE — (D)PREP SKN CHLRAPRP APPL 26ML -- CONVERT TO ITEM 371833

## (undated) DEVICE — SPECIMEN RETRIEVAL POUCH: Brand: ENDO CATCH GOLD

## (undated) DEVICE — MEDI-VAC NON-CONDUCTIVE SUCTION TUBING: Brand: CARDINAL HEALTH

## (undated) DEVICE — UNIVERSAL FIXATION CANNULA: Brand: VERSAONE

## (undated) DEVICE — 1200 GUARD II KIT W/5MM TUBE W/O VAC TUBE: Brand: GUARDIAN

## (undated) DEVICE — APPLIER LIG CLP 5MM CONTAIN 16 TI CLP DISP ENDO CLP

## (undated) DEVICE — STAPLER INT AD L L25MM DIA12MM INTLUMN WHT TI CIR CUT 2 ROW

## (undated) DEVICE — SUTURE VCRL SZ 2-0 L27IN ABSRB UD L26MM SH 1/2 CIR J417H